# Patient Record
Sex: FEMALE | Race: WHITE | Employment: PART TIME
[De-identification: names, ages, dates, MRNs, and addresses within clinical notes are randomized per-mention and may not be internally consistent; named-entity substitution may affect disease eponyms.]

---

## 2017-01-17 ENCOUNTER — PROCEDURE VISIT (OUTPATIENT)
Dept: OBGYN | Facility: CLINIC | Age: 47
End: 2017-01-17

## 2017-01-17 DIAGNOSIS — N83.209 CYST OF OVARY, UNSPECIFIED LATERALITY: Primary | ICD-10-CM

## 2017-01-17 PROCEDURE — 76830 TRANSVAGINAL US NON-OB: CPT | Performed by: OBSTETRICS & GYNECOLOGY

## 2017-01-17 PROCEDURE — 76856 US EXAM PELVIC COMPLETE: CPT | Performed by: OBSTETRICS & GYNECOLOGY

## 2017-02-06 ENCOUNTER — OFFICE VISIT (OUTPATIENT)
Dept: OBGYN | Facility: CLINIC | Age: 47
End: 2017-02-06

## 2017-02-06 VITALS
BODY MASS INDEX: 22.56 KG/M2 | HEIGHT: 62 IN | WEIGHT: 122.6 LBS | DIASTOLIC BLOOD PRESSURE: 60 MMHG | SYSTOLIC BLOOD PRESSURE: 104 MMHG

## 2017-02-06 DIAGNOSIS — N83.201 CYST OF RIGHT OVARY: Primary | ICD-10-CM

## 2017-02-06 PROCEDURE — 99212 OFFICE O/P EST SF 10 MIN: CPT | Performed by: NURSE PRACTITIONER

## 2017-02-06 RX ORDER — FLUTICASONE PROPIONATE 50 MCG
SPRAY, SUSPENSION (ML) NASAL
COMMUNITY
Start: 2016-12-28 | End: 2022-03-23

## 2017-02-06 RX ORDER — ALPRAZOLAM 0.25 MG/1
TABLET ORAL
COMMUNITY
Start: 2016-12-20 | End: 2019-04-16

## 2017-02-06 RX ORDER — RANITIDINE 150 MG/1
TABLET ORAL
COMMUNITY
Start: 2017-01-02 | End: 2018-03-22 | Stop reason: ALTCHOICE

## 2017-03-13 ENCOUNTER — HOSPITAL ENCOUNTER (OUTPATIENT)
Age: 47
Setting detail: SPECIMEN
Discharge: HOME | End: 2017-03-13
Payer: MEDICARE

## 2017-03-13 LAB
ABSOLUTE EOS #: 0.1 K/UL (ref 0–0.4)
ABSOLUTE LYMPH #: 1.3 K/UL (ref 1–4.8)
ABSOLUTE MONO #: 1 K/UL (ref 0.1–1.2)
ALBUMIN SERPL-MCNC: 3.9 G/DL (ref 3.5–5.2)
ALBUMIN/GLOBULIN RATIO: 1.6 (ref 1–2.5)
ALP BLD-CCNC: 77 U/L (ref 35–104)
ALT SERPL-CCNC: 11 U/L (ref 5–33)
ANION GAP SERPL CALCULATED.3IONS-SCNC: 14 MMOL/L (ref 9–17)
AST SERPL-CCNC: 16 U/L
BASOPHILS # BLD: 0 % (ref 0–2)
BASOPHILS ABSOLUTE: 0 K/UL (ref 0–0.2)
BILIRUB SERPL-MCNC: 0.38 MG/DL (ref 0.3–1.2)
BUN BLDV-MCNC: 9 MG/DL (ref 6–20)
BUN/CREAT BLD: ABNORMAL (ref 9–20)
CALCIUM SERPL-MCNC: 8.5 MG/DL (ref 8.6–10.4)
CHLORIDE BLD-SCNC: 100 MMOL/L (ref 98–107)
CHOLESTEROL, TOTAL: 139 MG/DL
CHOLESTEROL/HDL RATIO: 2.4
CHOLESTEROL/HDL RATIO: ABNORMAL
CHOLESTEROL: 139 MG/DL
CO2: 23 MMOL/L (ref 20–31)
CREAT SERPL-MCNC: 0.59 MG/DL (ref 0.5–0.9)
DIFFERENTIAL TYPE: ABNORMAL
EOSINOPHILS RELATIVE PERCENT: 1 % (ref 1–4)
GFR AFRICAN AMERICAN: >60 ML/MIN
GFR NON-AFRICAN AMERICAN: >60 ML/MIN
GFR SERPL CREATININE-BSD FRML MDRD: ABNORMAL ML/MIN/{1.73_M2}
GFR SERPL CREATININE-BSD FRML MDRD: ABNORMAL ML/MIN/{1.73_M2}
GLUCOSE BLD-MCNC: 112 MG/DL (ref 70–99)
HCT VFR BLD CALC: 39.9 % (ref 36–46)
HDLC SERPL-MCNC: 57 MG/DL
HDLC SERPL-MCNC: 57 MG/DL (ref 35–70)
HEMOGLOBIN: 13.9 G/DL (ref 12–16)
LDL CHOLESTEROL CALCULATED: 75 MG/DL (ref 0–160)
LDL CHOLESTEROL: 71 MG/DL (ref 0–130)
LYMPHOCYTES # BLD: 12 % (ref 24–44)
MCH RBC QN AUTO: 32.9 PG (ref 26–34)
MCHC RBC AUTO-ENTMCNC: 34.7 G/DL (ref 31–37)
MCV RBC AUTO: 94.8 FL (ref 80–100)
MONOCYTES # BLD: 10 % (ref 2–11)
PDW BLD-RTO: 13 % (ref 12.5–15.4)
PLATELET # BLD: 213 K/UL (ref 140–450)
PLATELET ESTIMATE: ABNORMAL
PMV BLD AUTO: 8.6 FL (ref 6–12)
POTASSIUM SERPL-SCNC: 4.2 MMOL/L (ref 3.7–5.3)
RBC # BLD: 4.21 M/UL (ref 4–5.2)
RBC # BLD: ABNORMAL 10*6/UL
SEG NEUTROPHILS: 77 % (ref 36–66)
SEGMENTED NEUTROPHILS ABSOLUTE COUNT: 8 K/UL (ref 1.8–7.7)
SODIUM BLD-SCNC: 137 MMOL/L (ref 135–144)
TOTAL PROTEIN: 6.3 G/DL (ref 6.4–8.3)
TRIGL SERPL-MCNC: 55 MG/DL
TRIGL SERPL-MCNC: 55 MG/DL
TSH SERPL DL<=0.05 MIU/L-ACNC: 4.92 MIU/L (ref 0.3–5)
VLDLC SERPL CALC-MCNC: ABNORMAL MG/DL
VLDLC SERPL CALC-MCNC: NORMAL MG/DL (ref 1–30)
WBC # BLD: 10.4 K/UL (ref 3.5–11)
WBC # BLD: ABNORMAL 10*3/UL

## 2017-03-13 PROCEDURE — 80061 LIPID PANEL: CPT

## 2017-03-13 PROCEDURE — 85025 COMPLETE CBC W/AUTO DIFF WBC: CPT

## 2017-03-13 PROCEDURE — 36415 COLL VENOUS BLD VENIPUNCTURE: CPT

## 2017-03-13 PROCEDURE — 84443 ASSAY THYROID STIM HORMONE: CPT

## 2017-03-13 PROCEDURE — 80053 COMPREHEN METABOLIC PANEL: CPT

## 2017-04-02 ENCOUNTER — HOSPITAL ENCOUNTER (EMERGENCY)
Age: 47
Discharge: HOME | End: 2017-04-03
Attending: EMERGENCY MEDICINE
Payer: MEDICARE

## 2017-04-02 DIAGNOSIS — R10.31 ABDOMINAL PAIN, RIGHT LOWER QUADRANT: Primary | ICD-10-CM

## 2017-04-02 DIAGNOSIS — Z87.42 HISTORY OF OVARIAN CYST: ICD-10-CM

## 2017-04-02 LAB
-: ABNORMAL
AMORPHOUS: ABNORMAL
BACTERIA: ABNORMAL
BILIRUBIN URINE: NEGATIVE
CASTS UA: ABNORMAL /LPF
COLOR: YELLOW
COMMENT UA: ABNORMAL
CRYSTALS, UA: ABNORMAL /HPF
EPITHELIAL CELLS UA: ABNORMAL /HPF
GLUCOSE URINE: NEGATIVE
HCG(URINE) PREGNANCY TEST: NEGATIVE
KETONES, URINE: NEGATIVE
LEUKOCYTE ESTERASE, URINE: NEGATIVE
MUCUS: ABNORMAL
NITRITE, URINE: NEGATIVE
OTHER OBSERVATIONS UA: ABNORMAL
PH UA: 6 (ref 5–8)
PROTEIN UA: NEGATIVE
RBC UA: ABNORMAL /HPF
RENAL EPITHELIAL, UA: ABNORMAL /HPF
SPECIFIC GRAVITY UA: 1.01 (ref 1–1.03)
TRICHOMONAS: ABNORMAL
TURBIDITY: CLEAR
URINE HGB: NEGATIVE
UROBILINOGEN, URINE: NORMAL
WBC UA: ABNORMAL /HPF
YEAST: ABNORMAL

## 2017-04-02 PROCEDURE — 84703 CHORIONIC GONADOTROPIN ASSAY: CPT

## 2017-04-02 PROCEDURE — 6360000002 HC RX W HCPCS: Performed by: EMERGENCY MEDICINE

## 2017-04-02 PROCEDURE — 96372 THER/PROPH/DIAG INJ SC/IM: CPT

## 2017-04-02 PROCEDURE — 99284 EMERGENCY DEPT VISIT MOD MDM: CPT

## 2017-04-02 PROCEDURE — 81001 URINALYSIS AUTO W/SCOPE: CPT

## 2017-04-02 PROCEDURE — 6370000000 HC RX 637 (ALT 250 FOR IP): Performed by: EMERGENCY MEDICINE

## 2017-04-02 RX ORDER — HYDROCODONE BITARTRATE AND ACETAMINOPHEN 5; 325 MG/1; MG/1
1 TABLET ORAL EVERY 6 HOURS PRN
Qty: 6 TABLET | Refills: 0 | Status: SHIPPED | OUTPATIENT
Start: 2017-04-02 | End: 2017-04-10 | Stop reason: ALTCHOICE

## 2017-04-02 RX ORDER — KETOROLAC TROMETHAMINE 30 MG/ML
60 INJECTION, SOLUTION INTRAMUSCULAR; INTRAVENOUS ONCE
Status: COMPLETED | OUTPATIENT
Start: 2017-04-02 | End: 2017-04-02

## 2017-04-02 RX ORDER — HYDROCODONE BITARTRATE AND ACETAMINOPHEN 5; 325 MG/1; MG/1
2 TABLET ORAL ONCE
Status: COMPLETED | OUTPATIENT
Start: 2017-04-02 | End: 2017-04-02

## 2017-04-02 RX ADMIN — KETOROLAC TROMETHAMINE 60 MG: 30 INJECTION, SOLUTION INTRAMUSCULAR at 22:58

## 2017-04-02 RX ADMIN — HYDROCODONE BITARTRATE AND ACETAMINOPHEN 2 TABLET: 5; 325 TABLET ORAL at 22:58

## 2017-04-02 ASSESSMENT — PAIN DESCRIPTION - LOCATION: LOCATION: PELVIS

## 2017-04-02 ASSESSMENT — PAIN DESCRIPTION - DESCRIPTORS: DESCRIPTORS: SHARP

## 2017-04-02 ASSESSMENT — PAIN SCALES - GENERAL
PAINLEVEL_OUTOF10: 8
PAINLEVEL_OUTOF10: 8

## 2017-04-03 VITALS
RESPIRATION RATE: 16 BRPM | DIASTOLIC BLOOD PRESSURE: 78 MMHG | TEMPERATURE: 98.1 F | SYSTOLIC BLOOD PRESSURE: 117 MMHG | BODY MASS INDEX: 22.08 KG/M2 | HEIGHT: 62 IN | OXYGEN SATURATION: 96 % | WEIGHT: 120 LBS | HEART RATE: 76 BPM

## 2017-04-03 ASSESSMENT — ENCOUNTER SYMPTOMS
COUGH: 1
SHORTNESS OF BREATH: 0
ABDOMINAL PAIN: 1
NAUSEA: 0
TROUBLE SWALLOWING: 0
DIARRHEA: 0
BLOOD IN STOOL: 0
PHOTOPHOBIA: 0
RHINORRHEA: 0
CONSTIPATION: 0
BACK PAIN: 0
VOMITING: 0
SORE THROAT: 0
SINUS PRESSURE: 0

## 2017-04-03 ASSESSMENT — PAIN SCALES - GENERAL
PAINLEVEL_OUTOF10: 4
PAINLEVEL_OUTOF10: 4

## 2017-04-03 ASSESSMENT — PAIN DESCRIPTION - LOCATION
LOCATION: ABDOMEN;PELVIS
LOCATION: PELVIS

## 2017-04-10 ENCOUNTER — OFFICE VISIT (OUTPATIENT)
Dept: OBGYN CLINIC | Age: 47
End: 2017-04-10
Payer: MEDICARE

## 2017-04-10 VITALS
WEIGHT: 119.6 LBS | SYSTOLIC BLOOD PRESSURE: 112 MMHG | HEIGHT: 61 IN | DIASTOLIC BLOOD PRESSURE: 80 MMHG | BODY MASS INDEX: 22.58 KG/M2

## 2017-04-10 DIAGNOSIS — Z01.419 ENCOUNTER FOR GYNECOLOGICAL EXAMINATION WITHOUT ABNORMAL FINDING: Primary | ICD-10-CM

## 2017-04-10 DIAGNOSIS — Z12.31 ENCOUNTER FOR SCREENING MAMMOGRAM FOR BREAST CANCER: ICD-10-CM

## 2017-04-10 DIAGNOSIS — N83.201 RIGHT OVARIAN CYST: ICD-10-CM

## 2017-04-10 LAB — PAP SMEAR: NORMAL

## 2017-04-10 PROCEDURE — 99396 PREV VISIT EST AGE 40-64: CPT | Performed by: NURSE PRACTITIONER

## 2017-04-10 RX ORDER — PROMETHAZINE HYDROCHLORIDE 25 MG/1
TABLET ORAL
COMMUNITY
Start: 2017-01-02 | End: 2022-03-23

## 2017-04-10 RX ORDER — GABAPENTIN 100 MG/1
CAPSULE ORAL
COMMUNITY
Start: 2017-03-08 | End: 2019-04-16

## 2017-04-10 RX ORDER — PANTOPRAZOLE SODIUM 40 MG/1
TABLET, DELAYED RELEASE ORAL
COMMUNITY
Start: 2017-02-23 | End: 2017-10-02

## 2017-04-10 RX ORDER — BENZONATATE 100 MG/1
CAPSULE ORAL
COMMUNITY
Start: 2017-04-06 | End: 2022-03-23

## 2017-04-10 RX ORDER — IBUPROFEN 800 MG/1
800 TABLET ORAL EVERY 8 HOURS PRN
Qty: 120 TABLET | Refills: 1 | Status: SHIPPED | OUTPATIENT
Start: 2017-04-10 | End: 2022-03-23

## 2017-04-10 RX ORDER — TIOTROPIUM BROMIDE INHALATION SPRAY 3.12 UG/1
SPRAY, METERED RESPIRATORY (INHALATION)
COMMUNITY
Start: 2017-03-13 | End: 2022-03-23

## 2017-04-10 RX ORDER — METHYLPREDNISOLONE 4 MG/1
TABLET ORAL
COMMUNITY
Start: 2017-04-06 | End: 2017-10-02 | Stop reason: ALTCHOICE

## 2017-04-10 RX ORDER — MIRTAZAPINE 15 MG/1
TABLET, FILM COATED ORAL
COMMUNITY
Start: 2017-04-06 | End: 2018-03-22 | Stop reason: ALTCHOICE

## 2017-04-10 ASSESSMENT — ENCOUNTER SYMPTOMS
CONSTIPATION: 0
ABDOMINAL DISTENTION: 0
COUGH: 0
SHORTNESS OF BREATH: 0
DIARRHEA: 0
ABDOMINAL PAIN: 0
BACK PAIN: 0

## 2017-04-17 DIAGNOSIS — Z01.419 ENCOUNTER FOR GYNECOLOGICAL EXAMINATION WITHOUT ABNORMAL FINDING: ICD-10-CM

## 2017-07-07 ENCOUNTER — TELEPHONE (OUTPATIENT)
Dept: GASTROENTEROLOGY | Age: 47
End: 2017-07-07

## 2017-10-02 ENCOUNTER — OFFICE VISIT (OUTPATIENT)
Dept: GASTROENTEROLOGY | Age: 47
End: 2017-10-02
Payer: MEDICARE

## 2017-10-02 VITALS
HEART RATE: 82 BPM | OXYGEN SATURATION: 98 % | DIASTOLIC BLOOD PRESSURE: 79 MMHG | BODY MASS INDEX: 22.08 KG/M2 | WEIGHT: 120 LBS | HEIGHT: 62 IN | TEMPERATURE: 97.8 F | SYSTOLIC BLOOD PRESSURE: 110 MMHG

## 2017-10-02 DIAGNOSIS — R63.4 WEIGHT LOSS: ICD-10-CM

## 2017-10-02 DIAGNOSIS — K59.00 CONSTIPATION, UNSPECIFIED CONSTIPATION TYPE: Primary | ICD-10-CM

## 2017-10-02 DIAGNOSIS — K21.9 GASTROESOPHAGEAL REFLUX DISEASE, ESOPHAGITIS PRESENCE NOT SPECIFIED: ICD-10-CM

## 2017-10-02 PROCEDURE — 99204 OFFICE O/P NEW MOD 45 MIN: CPT | Performed by: INTERNAL MEDICINE

## 2017-10-02 RX ORDER — POLYETHYLENE GLYCOL 3350 17 G/17G
17 POWDER, FOR SOLUTION ORAL 3 TIMES DAILY
Qty: 1530 G | Refills: 5 | Status: SHIPPED | OUTPATIENT
Start: 2017-10-02 | End: 2017-11-01

## 2017-10-02 RX ORDER — OMEPRAZOLE 40 MG/1
40 CAPSULE, DELAYED RELEASE ORAL DAILY
Qty: 30 CAPSULE | Refills: 3 | Status: SHIPPED | OUTPATIENT
Start: 2017-10-02 | End: 2018-03-22 | Stop reason: SDUPTHER

## 2017-10-02 ASSESSMENT — ENCOUNTER SYMPTOMS
TROUBLE SWALLOWING: 0
CONSTIPATION: 1
ABDOMINAL DISTENTION: 1
VOMITING: 1
BACK PAIN: 1
CHOKING: 0
DIARRHEA: 0
SHORTNESS OF BREATH: 1
RECTAL PAIN: 0
NAUSEA: 1
EYES NEGATIVE: 1
COUGH: 0
BLOOD IN STOOL: 0
SORE THROAT: 0
ABDOMINAL PAIN: 1
VOICE CHANGE: 1
ANAL BLEEDING: 0

## 2017-10-02 NOTE — PROGRESS NOTES
INITIAL NOTE    HISTORY OF PRESENT ILLNESS: Ms. Rin Byrd is a 41 y.o. female referred for evaluation of n/v. She reports frequent heartburns. Upper abdominal discomfort. No clear triggers. Nausea and vomiting. She lost about 30 pounds over the past few months due to poor eating. No blood in the stool or melena. She reports chronic constipation for years. This has been worse over the past few months. She reports a bowel movement every 3-5 days. Hard. She takes some MiraLAX. She reports normal EGD and colonoscopy around 5 years ago. She smokes half pack per day. She denies alcohol. No illicit drug use. Past Medical, Family, and Social History reviewed and does contribute to the patient presenting condition. Patient's PMH/PSH,SH,PSYCH Hx, MEDs, ALLERGIES, and ROS were all reviewed and updated in the appropriate sections.     PAST MEDICAL HISTORY:  Past Medical History:   Diagnosis Date    Anxiety     Asthma     Chronic back pain     COPD (chronic obstructive pulmonary disease) ([facility] [address]     Depression     Fatigue 6/15/13       Past Surgical History:   Procedure Laterality Date     SECTION, LOW TRANSVERSE      HAND TENDON SURGERY Left     wrist    INTRAUTERINE DEVICE INSERTION      Mirena    INTRAUTERINE DEVICE REMOVAL  2016    NECK SURGERY      OVARIAN CYST REMOVAL      OVARIAN CYST REMOVAL Bilateral 2005       CURRENT MEDICATIONS:    Current Outpatient Prescriptions:     SPIRIVA RESPIMAT 2.5 MCG/ACT AERS inhaler, , Disp: , Rfl:     RA ALLERGY RELIEF 10 MG tablet, , Disp: , Rfl:     gabapentin (NEURONTIN) 100 MG capsule, , Disp: , Rfl:     mirtazapine (REMERON) 15 MG tablet, , Disp: , Rfl:     benzonatate (TESSALON) 100 MG capsule, , Disp: , Rfl:     promethazine (PHENERGAN) 25 MG tablet, , Disp: , Rfl:     pantoprazole (PROTONIX) 40 MG tablet, , Disp: , Rfl:     ibuprofen (ADVIL;MOTRIN) 800 MG tablet, Take 1 tablet by mouth every 8 hours as needed for Pain, Disp: 120 tablet, Rfl: 1    ALPRAZolam (XANAX) 0.25 MG tablet, , Disp: , Rfl:     fluticasone (FLONASE) 50 MCG/ACT nasal spray, , Disp: , Rfl:     ranitidine (ZANTAC) 150 MG tablet, , Disp: , Rfl:     ondansetron (ZOFRAN ODT) 4 MG disintegrating tablet, Take 1 tablet by mouth every 8 hours as needed for Nausea, Disp: 20 tablet, Rfl: 0    omeprazole (PRILOSEC) 40 MG capsule, TAKE ONE CAPSULE BY MOUTH DAILY, Disp: 30 capsule, Rfl: 2    SYMBICORT 80-4.5 MCG/ACT AERO, INHALE TWO PUFFS BY MOUTH TWICE A DAY, Disp: 1 Inhaler, Rfl: 3    VENTOLIN  (90 BASE) MCG/ACT inhaler, INHALE TWO PUFFS BY MOUTH EVERY 6 HOURS AS NEEDED FOR WHEEZING, Disp: 1 Inhaler, Rfl: 3    Nebulizers (AIRIAL COMPACT MINI NEBULIZER) MISC, Use as directed Diagnosis: copd, Disp: 1 each, Rfl: 0    ipratropium-albuterol (DUONEB) 0.5-2.5 (3) MG/3ML SOLN nebulizer solution, Inhale 3 mLs into the lungs every 4 hours, Disp: 360 mL, Rfl: 1    buPROPion (WELLBUTRIN) 100 MG tablet, Take 1.5 tablets by mouth 2 times daily, Disp: 60 tablet, Rfl: 3    ALLERGIES:   Allergies   Allergen Reactions    Augmentin [Amoxicillin-Pot Clavulanate]     Codeine        FAMILY HISTORY:       Problem Relation Age of Onset    High Blood Pressure Mother     Diabetes Mother      No known GI pathology. SOCIAL HISTORY:   Social History     Social History    Marital status:      Spouse name: N/A    Number of children: N/A    Years of education: N/A     Occupational History    Not on file.      Social History Main Topics    Smoking status: Current Every Day Smoker     Packs/day: 0.50     Types: Cigarettes    Smokeless tobacco: Never Used    Alcohol use Yes      Comment: occasional    Drug use: No    Sexual activity: No     Other Topics Concern    Not on file     Social History Narrative       REVIEW OF SYSTEMS: A 12-point review of systems was obtained and pertinent positives and negatives were enumerated above in the history of present illness. All other reviewed systems / symptoms were negative. Review of Systems   Constitutional: Positive for fatigue and unexpected weight change (weight loss). Negative for chills, diaphoresis and fever. HENT: Positive for congestion and voice change (hoarse voice). Negative for sore throat and trouble swallowing. Eyes: Negative. Respiratory: Positive for shortness of breath (COPD). Negative for cough and choking. Cardiovascular: Positive for palpitations. Negative for chest pain and leg swelling. Gastrointestinal: Positive for abdominal distention, abdominal pain, constipation, nausea and vomiting. Negative for anal bleeding, blood in stool, diarrhea and rectal pain. Endocrine: Negative. Genitourinary: Negative for difficulty urinating and dysuria. Musculoskeletal: Positive for arthralgias and back pain. Skin: Negative. Allergic/Immunologic: Negative for environmental allergies and food allergies. Neurological: Positive for dizziness and light-headedness. Negative for headaches. Hematological: Does not bruise/bleed easily. Psychiatric/Behavioral: Positive for sleep disturbance. PHYSICAL EXAMINATION: Vital signs reviewed per the nursing documentation. /79  Pulse 82  Temp 97.8 °F (36.6 °C) (Oral)   Ht 5' 2\" (1.575 m)  Wt 120 lb (54.4 kg)  SpO2 98%  BMI 21.95 kg/m2  Body mass index is 21.95 kg/(m^2). I personally reviewed the nurse's notes and documentation and I agree with her notes. General: alert, appears stated age and cooperative Psych: Normal. and Alert and oriented, appropriate affect. . Normal affect. Mentation normal  HEENT: PERRLA. Clear conjunctivae and sclerae. Moist oral mucosae, no lesions or ulcers. The neck is supple, without lymphadenopathy or jugular venous distension. No masses. Normal thyroid. Cardiovascular: S1 S2 RRR no rubs or murmurs. Pulmonary: clear BL. No accessory muscle usage.   Abdominal Exam: Soft, NT ND, no hepato or

## 2017-10-02 NOTE — MR AVS SNAPSHOT
After Visit Summary             Ceci Benton   10/2/2017 3:15 PM   Office Visit    Description:  Female : 1970   Provider:  Lelia Nelson MD   Department:  [facility]              Your Follow-Up and Future Appointments         Below is a list of your follow-up and future appointments. This may not be a complete list as you may have made appointments directly with providers that we are not aware of or your providers may have made some for you. Please call your providers to confirm appointments. It is important to keep your appointments. Please bring your current insurance card, photo ID, co-pay, and all medication bottles to your appointment. If self-pay, payment is expected at the time of service. Your To-Do List     Future Appointments Provider Department Dept Phone    2017 1:30 PM Lelia Nelson MD [facility] [phone]    Please arrive 15 minutes prior to appointment, bring photo ID and insurance card. Future Orders Complete By Expires    EGD [GI2 Custom]  2018 10/2/2018         Information from Your Visit        Department     Name Address Phone Fax    [facility] [address] [facility]  [place] [facility]  [address] [facility] [address] [phone]      You Were Seen for:         Comments    Constipation, unspecified constipation type   [[id number]]         Vital Signs     Blood Pressure Pulse Temperature Height Weight Oxygen Saturation    110/79 82 97.8 °F (36.6 °C) (Oral) 5' 2\" (1.575 m) 120 lb (54.4 kg) 98%    Body Mass Index Smoking Status                21.95 kg/m2 Current Every Day Smoker             Today's Medication Changes          These changes are accurate as of: 10/2/17  4:00 PM.  If you have any questions, ask your nurse or doctor. START taking these medications           polyethylene glycol powder   Commonly known as:  MIRALAX   Instructions:   Take 17 g by mouth 3 times daily Quantity:  1530 g   Refills:  5   Started by:  Sim Draper MD         CHANGE how you take these medications           * omeprazole 40 MG delayed release capsule   Commonly known as:  PRILOSEC   Instructions:  TAKE ONE CAPSULE BY MOUTH DAILY   Quantity:  30 capsule   Refills:  2   What changed:  Another medication with the same name was added. Make sure you understand how and when to take each. Changed by:  Luca Quevedo MD       * omeprazole 40 MG delayed release capsule   Commonly known as:  PRILOSEC   Instructions: Take 1 capsule by mouth daily   Quantity:  30 capsule   Refills:  3   What changed: You were already taking a medication with the same name, and this prescription was added. Make sure you understand how and when to take each. Changed by:  Sim Draper MD       * Notice: This list has 2 medication(s) that are the same as other medications prescribed for you. Read the directions carefully, and ask your doctor or other care provider to review them with you.       STOP taking these medications           methylPREDNISolone 4 MG tablet   Commonly known as:  MEDROL DOSEPACK   Stopped by:  Sim Draper MD       pantoprazole 40 MG tablet   Commonly known as:  PROTONIX   Stopped by:  Sim Draper MD            Where to Get Your Medications      You can get these medications from any pharmacy     Bring a paper prescription for each of these medications     omeprazole 40 MG delayed release capsule    polyethylene glycol powder               Your Current Medications Are              omeprazole (PRILOSEC) 40 MG delayed release capsule Take 1 capsule by mouth daily    polyethylene glycol (MIRALAX) powder Take 17 g by mouth 3 times daily    SPIRIVA RESPIMAT 2.5 MCG/ACT AERS inhaler     RA ALLERGY RELIEF 10 MG tablet     gabapentin (NEURONTIN) 100 MG capsule     mirtazapine (REMERON) 15 MG tablet     benzonatate (TESSALON) 100 MG capsule     promethazine (PHENERGAN) 25 MG tablet

## 2017-10-17 ENCOUNTER — HOSPITAL ENCOUNTER (OUTPATIENT)
Age: 47
Setting detail: OUTPATIENT SURGERY
Discharge: HOME | End: 2017-10-17
Attending: INTERNAL MEDICINE | Admitting: INTERNAL MEDICINE
Payer: MEDICARE

## 2017-10-17 ENCOUNTER — ANESTHESIA EVENT (OUTPATIENT)
Dept: OPERATING ROOM | Age: 47
End: 2017-10-17
Payer: MEDICARE

## 2017-10-17 ENCOUNTER — ANESTHESIA (OUTPATIENT)
Dept: OPERATING ROOM | Age: 47
End: 2017-10-17
Payer: MEDICARE

## 2017-10-17 VITALS — DIASTOLIC BLOOD PRESSURE: 82 MMHG | OXYGEN SATURATION: 97 % | SYSTOLIC BLOOD PRESSURE: 118 MMHG

## 2017-10-17 VITALS
HEART RATE: 76 BPM | TEMPERATURE: 97.4 F | RESPIRATION RATE: 14 BRPM | BODY MASS INDEX: 21.16 KG/M2 | HEIGHT: 62 IN | SYSTOLIC BLOOD PRESSURE: 124 MMHG | OXYGEN SATURATION: 98 % | DIASTOLIC BLOOD PRESSURE: 66 MMHG | WEIGHT: 115 LBS

## 2017-10-17 LAB
-: NORMAL
HCG, PREGNANCY URINE (POC): NEGATIVE

## 2017-10-17 PROCEDURE — 6360000002 HC RX W HCPCS: Performed by: NURSE ANESTHETIST, CERTIFIED REGISTERED

## 2017-10-17 PROCEDURE — 3609017100 HC EGD: Performed by: INTERNAL MEDICINE

## 2017-10-17 PROCEDURE — 7100000001 HC PACU RECOVERY - ADDTL 15 MIN: Performed by: INTERNAL MEDICINE

## 2017-10-17 PROCEDURE — 3700000000 HC ANESTHESIA ATTENDED CARE: Performed by: INTERNAL MEDICINE

## 2017-10-17 PROCEDURE — 7100000030 HC ASPR PHASE II RECOVERY - FIRST 15 MIN: Performed by: INTERNAL MEDICINE

## 2017-10-17 PROCEDURE — 7100000000 HC PACU RECOVERY - FIRST 15 MIN: Performed by: INTERNAL MEDICINE

## 2017-10-17 PROCEDURE — 2580000003 HC RX 258: Performed by: INTERNAL MEDICINE

## 2017-10-17 PROCEDURE — 7100000010 HC PHASE II RECOVERY - FIRST 15 MIN: Performed by: INTERNAL MEDICINE

## 2017-10-17 PROCEDURE — 84703 CHORIONIC GONADOTROPIN ASSAY: CPT

## 2017-10-17 PROCEDURE — 43235 EGD DIAGNOSTIC BRUSH WASH: CPT | Performed by: INTERNAL MEDICINE

## 2017-10-17 RX ORDER — SODIUM CHLORIDE, SODIUM LACTATE, POTASSIUM CHLORIDE, CALCIUM CHLORIDE 600; 310; 30; 20 MG/100ML; MG/100ML; MG/100ML; MG/100ML
INJECTION, SOLUTION INTRAVENOUS CONTINUOUS
Status: DISCONTINUED | OUTPATIENT
Start: 2017-10-17 | End: 2017-10-17 | Stop reason: HOSPADM

## 2017-10-17 RX ORDER — PROPOFOL 10 MG/ML
INJECTION, EMULSION INTRAVENOUS PRN
Status: DISCONTINUED | OUTPATIENT
Start: 2017-10-17 | End: 2017-10-17 | Stop reason: SDUPTHER

## 2017-10-17 RX ORDER — ONDANSETRON 2 MG/ML
4 INJECTION INTRAMUSCULAR; INTRAVENOUS
Status: DISCONTINUED | OUTPATIENT
Start: 2017-10-17 | End: 2017-10-17 | Stop reason: HOSPADM

## 2017-10-17 RX ORDER — LABETALOL HYDROCHLORIDE 5 MG/ML
5 INJECTION, SOLUTION INTRAVENOUS EVERY 10 MIN PRN
Status: DISCONTINUED | OUTPATIENT
Start: 2017-10-17 | End: 2017-10-17 | Stop reason: HOSPADM

## 2017-10-17 RX ORDER — DIPHENHYDRAMINE HYDROCHLORIDE 50 MG/ML
12.5 INJECTION INTRAMUSCULAR; INTRAVENOUS
Status: DISCONTINUED | OUTPATIENT
Start: 2017-10-17 | End: 2017-10-17 | Stop reason: HOSPADM

## 2017-10-17 RX ADMIN — PROPOFOL 200 MG: 10 INJECTION, EMULSION INTRAVENOUS at 12:52

## 2017-10-17 RX ADMIN — SODIUM CHLORIDE, POTASSIUM CHLORIDE, SODIUM LACTATE AND CALCIUM CHLORIDE: 600; 310; 30; 20 INJECTION, SOLUTION INTRAVENOUS at 10:49

## 2017-10-17 ASSESSMENT — ENCOUNTER SYMPTOMS: STRIDOR: 0

## 2017-10-17 ASSESSMENT — COPD QUESTIONNAIRES: CAT_SEVERITY: NO INTERVAL CHANGE

## 2017-10-17 ASSESSMENT — PAIN SCALES - GENERAL: PAINLEVEL_OUTOF10: 0

## 2017-10-17 ASSESSMENT — PAIN - FUNCTIONAL ASSESSMENT: PAIN_FUNCTIONAL_ASSESSMENT: 0-10

## 2017-10-17 ASSESSMENT — LIFESTYLE VARIABLES: SMOKING_STATUS: 1

## 2017-10-17 NOTE — ANESTHESIA POSTPROCEDURE EVALUATION
POST- ANESTHESIA EVALUATION       Pt Name: Yevgeniy Merritt  MRN: [id number]  YOB: 1970  Date of evaluation: 10/17/2017  Time:  4:13 PM      /66   Pulse 76   Temp 97.4 °F (36.3 °C) (Temporal)   Resp 14   Ht 5' 2\" (1.575 m)   Wt 115 lb (52.2 kg)   SpO2 98%   BMI 21.03 kg/m²      Consciousness Level  Awake  Cardiopulmonary Status  Stable  Pain Adequately Treated YES  Nausea / Vomiting  NO  Adequate Hydration  YES  Anesthesia Related Complications NONE      Electronically signed by Wero Guzman MD on 10/17/2017 at 4:13 PM       Department of Anesthesiology  Postprocedure Note    Patient: Yevgeniy Merritt  MRN: [id number]  YOB: 1970  Date of evaluation: 10/17/2017  Time:  4:12 PM     Procedure Summary     Date:  10/17/17 Room / Location:  [address] [place] [address] Jim Coles 08 / [address]    Anesthesia Start:  1247 Anesthesia Stop:  4846    Procedure:  EGD ESOPHAGOGASTRODUODENOSCOPY (N/A Esophagus) Diagnosis:  (GERD WEIGHT LOSS CONSTIPATION)    Surgeon:  Rachid Davis MD Responsible Provider:  Wero Guzman MD    Anesthesia Type:  MAC, general ASA Status:  2          Anesthesia Type: MAC, general    Guzman Phase I: Guzman Score: 10    Guzman Phase II: Guzman Score: 10    Last vitals: Reviewed and per EMR flowsheets.        Anesthesia Post Evaluation

## 2017-10-17 NOTE — H&P
Take 1.5 tablets by mouth 2 times daily 60 tablet 3        General health:  Fairly good. No fever or chills. Skin:  No itching, redness or rash. HEENT:  No headache, epistaxis or sore throat. Neck:  No pain, stiffness or masses. Cardiovascular/Respiratory system:  Coughing, hx COPD. No chest pain, palpitation or shortness of breath. Gastrointestinal tract: See HPI. Genitourinary:  No burning on micturition. No hesitancy, urgency, frequency or discoloration of urine. Locomotor:  No bone or joint pains. No swelling. Neuropsychiatric:  No referable complaints. GENERAL PHYSICAL EXAM:     Vitals: /73   Pulse 77   Temp 97.2 °F (36.2 °C) (Oral)   Resp 16   Ht 5' 2\" (1.575 m)   Wt 115 lb (52.2 kg)   SpO2 98%   BMI 21.03 kg/m²  Body mass index is 21.03 kg/m². GENERAL APPEARANCE:   Yevgeniy Merritt is 41 y.o.,  female, mildly obese, nourished, conscious, alert. Does not appear to be distress or pain at this time. SKIN:  Warm, dry, no cyanosis or jaundice. HEAD:  Normocephalic, atraumatic, no swelling or tenderness. EYES:  Pupils equal, reactive to light. EARS:  No discharge, no marked hearing loss. NOSE:  No rhinorrhea, epistaxis or septal deformity. THROAT:  Not congested. No ulceration bleeding or discharge. NECK:  No stiffness, trachea central.  No palpable masses or L.N.                 CHEST:  Symmetrical and equal on expansion. HEART:  RRR S1 > S2. No audible murmurs or gallops. LUNGS:  Equal on expansion, normal breath sounds. No adventitious sounds. ABDOMEN:  Obese. Soft on palpation. No localized tenderness. No guarding or rigidity. No palpable organomegaly.               LYMPHATICS:  No

## 2017-10-17 NOTE — ANESTHESIA PRE PROCEDURE
[address]: 16  Min: 12   Min taken time: 10/17/17 1026  Max: 16   Max taken time: 10/17/17 1026  BP  Min: 120/73   Min taken time: 10/17/17 1026  Max: 120/73   Max taken time: 10/17/17 1026  SpO2  Av %  Min: 98 %   Min taken time: 10/17/17 1026  Max: 98 %   Max taken time: 10/17/17 1026    BMI:   Wt Readings from Last 3 Encounters:   10/17/17 115 lb (52.2 kg)   10/02/17 120 lb (54.4 kg)   04/10/17 119 lb 9.6 oz (54.3 kg)     Body mass index is 21.03 kg/m². CBC:   Lab Results   Component Value Date    WBC 10.4 2017    RBC 4.21 2017    HGB 13.9 2017    HCT 39.9 2017    MCV 94.8 2017    RDW 13.0 2017     2017       CMP:   Lab Results   Component Value Date     2017    K 4.2 2017     2017    CO2 23 2017    BUN 9 2017    CREATININE 0.59 2017    GFRAA >60 2017    LABGLOM >60 2017    GLUCOSE 112 2017    PROT 6.3 2017    CALCIUM 8.5 2017    BILITOT 0.38 2017    ALKPHOS 77 2017    AST 16 2017    ALT 11 2017       POC Tests: No results for input(s): POCGLU, POCNA, POCK, POCCL, POCBUN, POCHEMO, POCHCT in the last 72 hours. Coags  No results found for: PROTIME, INR, APTT    HCG (If Applicable)   Lab Results   Component Value Date    PREGTESTUR NEGATIVE 2017    HCG NEGATIVE 10/17/2017        ABGs No results found for: PHART, PO2ART, TIB1FNI, BNH7JEP, BEART, O4WCAGXU     Type & Screen (If Applicable)  No results found for: [facility]    Radiology:  Appropriate reports and / or films reviewed. Cardiac Testing: Appropriate reports and / or films reviewed.     EKG: Reviewed where appropriate    Daniela Grewal MD         Department of Anesthesiology  Preprocedure Note       Name:  Cheryl Goss   Age:  41 y.o.  :  1970                                          MRN:  [id number]         Date:  10/17/2017      Surgeon: Ebenezer Foreman):  Mathew Donaldson, every 4 hours 10/28/15   Gia Hernández MD   buPROPion [facility]) 100 MG tablet Take 1.5 tablets by mouth 2 times daily 5/29/15   Zonia Lebron MD       Current medications:    Current Facility-Administered Medications   Medication Dose Route Frequency Provider Last Rate Last Dose    lactated ringers infusion   Intravenous Continuous Evert Connell  mL/hr at 10/17/17 1049         Allergies:     Allergies   Allergen Reactions    Augmentin [Amoxicillin-Pot Clavulanate]     Codeine        Problem List:    Patient Active Problem List   Diagnosis Code    Heart palpitations R00.2    Insomnia G47.00    GERD (gastroesophageal reflux disease) K21.9    Weight loss R63.4       Past Medical History:        Diagnosis Date    Anxiety     Asthma     Chronic back pain     COPD (chronic obstructive pulmonary disease) ([facility] [address]     Depression     Fatigue 6/15/13       Past Surgical History:        Procedure Laterality Date     SECTION, LOW TRANSVERSE      COLONOSCOPY      ENDOSCOPY, COLON, DIAGNOSTIC      HAND TENDON SURGERY Left     wrist    INTRAUTERINE DEVICE INSERTION      Mirena    INTRAUTERINE DEVICE REMOVAL  2016    NECK SURGERY      OVARIAN CYST REMOVAL      OVARIAN CYST REMOVAL Bilateral 2005       Social History:    Social History   Substance Use Topics    Smoking status: Current Every Day Smoker     Packs/day: 0.50     Types: Cigarettes    Smokeless tobacco: Never Used    Alcohol use Yes      Comment: occasional                                Ready to quit: Not Answered  Counseling given: Not Answered      Vital Signs (Current):   Vitals:    10/17/17 1026   BP: 120/73   Pulse: 77   Resp: 16   Temp: 97.2 °F (36.2 °C)   TempSrc: Oral   SpO2: 98%   Weight: 115 lb (52.2 kg)   Height: 5' 2\" (1.575 m)                                              BP Readings from Last 3 Encounters:   10/17/17 120/73   10/02/17 110/79   04/10/17 112/80       NPO Status: Time of last liquid

## 2017-10-17 NOTE — OP NOTE
[facility]     REFERRING PHYSICIAN: No ref. provider found     PRIMARY CARE PROVIDER: Vikas Cordova MD    ATTENDING PHYSICIAN: Bernice Quiroz MD     HISTORY: Ms. Aminata Caal is a 41 y.o. female who presents to the [place] [address] Endoscopy unit for upper endoscopy. The patient's clinical history is remarkable for abd pain. She is currently medically stable and appropriate for the planned procedure. PREOPERATIVE DIAGNOSIS: abd pain. PROCEDURES:   1) Transoral Upper Endoscopy. POSTOPERATIVE DIAGNOSIS:   Mild esophagitis  Minimal gastritis     MEDICATIONS:   MAC per anesthesia    INSTRUMENT: Olympus GIF-H180 flexible Gastroscope. PREPARATION: The nature and character of the procedure as well as risks, benefits, and alternatives were discussed with the patient and informed consent was obtained. Complications were said to include, but were not limited to: medication allergy, medication reaction, cardiovascular and respiratory problems, bleeding, perforation, infection, and/or missed diagnosis. Following arrival in the endoscopy room, the patient was placed in the left lateral decubitus position and final time-out accomplished in the presence of the nursing staff. Baseline vital signs were obtained and reviewed, and IV sedation was subsequently initiated. FINDINGS:   Esophagus: The esophagus was inspected to the Z-line. The endoscopic exam showed irregular z-line with mild erythema. Stomach: The stomach was inspected in both forward and retroflex fashion and was appropriately distensible. The cardia, fundus, incisura, antrum and pylorus were identified via direct visualization. The endoscopic exam showed minimal scattered erythema. Duodenum: The proximal small bowel was inspected through the bulb, sweep, and second portion of the duodenum. The endoscopic exam showed no pathology. RECOMMENDATIONS:   1) Follow up with referring provider, as previously scheduled. 2) Omeprazole 40 mg daily. 3) Avoid NSAIDs. 4) Follow up with me in office in 3-4 weeks.           Rachid Pollock

## 2018-03-22 ENCOUNTER — OFFICE VISIT (OUTPATIENT)
Dept: PULMONOLOGY | Age: 48
End: 2018-03-22
Payer: MEDICARE

## 2018-03-22 ENCOUNTER — TELEPHONE (OUTPATIENT)
Dept: PULMONOLOGY | Age: 48
End: 2018-03-22

## 2018-03-22 VITALS
WEIGHT: 116 LBS | RESPIRATION RATE: 12 BRPM | OXYGEN SATURATION: 98 % | HEART RATE: 84 BPM | SYSTOLIC BLOOD PRESSURE: 98 MMHG | BODY MASS INDEX: 21.35 KG/M2 | DIASTOLIC BLOOD PRESSURE: 65 MMHG | HEIGHT: 62 IN | TEMPERATURE: 98.6 F

## 2018-03-22 VITALS — WEIGHT: 116 LBS | OXYGEN SATURATION: 97 % | HEIGHT: 62 IN | BODY MASS INDEX: 21.35 KG/M2 | HEART RATE: 56 BPM

## 2018-03-22 DIAGNOSIS — J45.40 MODERATE PERSISTENT ASTHMA WITHOUT COMPLICATION: Primary | ICD-10-CM

## 2018-03-22 DIAGNOSIS — F17.200 SMOKING: ICD-10-CM

## 2018-03-22 DIAGNOSIS — K21.9 GASTROESOPHAGEAL REFLUX DISEASE, ESOPHAGITIS PRESENCE NOT SPECIFIED: ICD-10-CM

## 2018-03-22 DIAGNOSIS — R06.02 SHORTNESS OF BREATH: Primary | ICD-10-CM

## 2018-03-22 DIAGNOSIS — R49.0 HOARSENESS OF VOICE: ICD-10-CM

## 2018-03-22 PROCEDURE — G8484 FLU IMMUNIZE NO ADMIN: HCPCS | Performed by: INTERNAL MEDICINE

## 2018-03-22 PROCEDURE — 94375 RESPIRATORY FLOW VOLUME LOOP: CPT | Performed by: INTERNAL MEDICINE

## 2018-03-22 PROCEDURE — 94726 PLETHYSMOGRAPHY LUNG VOLUMES: CPT | Performed by: INTERNAL MEDICINE

## 2018-03-22 PROCEDURE — 94729 DIFFUSING CAPACITY: CPT | Performed by: INTERNAL MEDICINE

## 2018-03-22 PROCEDURE — 99244 OFF/OP CNSLTJ NEW/EST MOD 40: CPT | Performed by: INTERNAL MEDICINE

## 2018-03-22 PROCEDURE — G8420 CALC BMI NORM PARAMETERS: HCPCS | Performed by: INTERNAL MEDICINE

## 2018-03-22 PROCEDURE — G8427 DOCREV CUR MEDS BY ELIG CLIN: HCPCS | Performed by: INTERNAL MEDICINE

## 2018-03-22 RX ORDER — BUSPIRONE HYDROCHLORIDE 30 MG/1
30 TABLET ORAL DAILY
COMMUNITY
Start: 2018-02-22 | End: 2019-04-16

## 2018-03-22 RX ORDER — ARIPIPRAZOLE 2 MG/1
2 TABLET ORAL DAILY
Status: ON HOLD | COMMUNITY
Start: 2018-02-22 | End: 2018-06-27

## 2018-03-22 RX ORDER — TRAMADOL HYDROCHLORIDE 50 MG/1
50 TABLET ORAL EVERY 6 HOURS PRN
Status: ON HOLD | COMMUNITY
Start: 2018-03-12 | End: 2018-06-27 | Stop reason: HOSPADM

## 2018-03-23 NOTE — PROGRESS NOTES
[facility], [facility].  [address] [place] [address]  Dept: [phone]  Dept Fax: [phone]      3/23/18    Patient: Shelbi Badillo  YOB: 1970    Dear Cathy Millard MD,    I had the pleasure of seeing one of your patients, Steven Peralta today in the office today. Please find attached my note with the assessment and plan of care. Thank you for allowing me to participate in the care of this patient. I will keep you updated on this patient's follow up and I look forward to serving you and your patients again in the future.     Kenyetta Austin MD  3/23/2018 7:35 PM

## 2018-06-23 ENCOUNTER — APPOINTMENT (OUTPATIENT)
Dept: CT IMAGING | Age: 48
DRG: 263 | End: 2018-06-23
Payer: MEDICARE

## 2018-06-23 ENCOUNTER — HOSPITAL ENCOUNTER (INPATIENT)
Age: 48
LOS: 3 days | Discharge: HOME | DRG: 263 | End: 2018-06-27
Attending: EMERGENCY MEDICINE | Admitting: FAMILY MEDICINE
Payer: MEDICARE

## 2018-06-23 DIAGNOSIS — K82.8 DYSFUNCTIONAL GALLBLADDER: ICD-10-CM

## 2018-06-23 DIAGNOSIS — D72.829 LEUKOCYTOSIS, UNSPECIFIED TYPE: ICD-10-CM

## 2018-06-23 DIAGNOSIS — R10.11 RUQ ABDOMINAL PAIN: Primary | ICD-10-CM

## 2018-06-23 DIAGNOSIS — R11.2 INTRACTABLE VOMITING WITH NAUSEA, UNSPECIFIED VOMITING TYPE: ICD-10-CM

## 2018-06-23 LAB
ABSOLUTE EOS #: 0 K/UL (ref 0–0.4)
ABSOLUTE IMMATURE GRANULOCYTE: ABNORMAL K/UL (ref 0–0.3)
ABSOLUTE LYMPH #: 0.9 K/UL (ref 1–4.8)
ABSOLUTE MONO #: 0.2 K/UL (ref 0.1–1.3)
ALBUMIN SERPL-MCNC: 4.4 G/DL (ref 3.5–5.2)
ALBUMIN/GLOBULIN RATIO: NORMAL (ref 1–2.5)
ALP BLD-CCNC: 82 U/L (ref 35–104)
ALT SERPL-CCNC: 12 U/L (ref 5–33)
ANION GAP SERPL CALCULATED.3IONS-SCNC: 13 MMOL/L (ref 9–17)
AST SERPL-CCNC: 19 U/L
BASOPHILS # BLD: 1 % (ref 0–2)
BASOPHILS ABSOLUTE: 0.1 K/UL (ref 0–0.2)
BILIRUB SERPL-MCNC: 0.84 MG/DL (ref 0.3–1.2)
BILIRUBIN DIRECT: 0.21 MG/DL
BILIRUBIN, INDIRECT: 0.63 MG/DL (ref 0–1)
BUN BLDV-MCNC: 17 MG/DL (ref 6–20)
BUN/CREAT BLD: ABNORMAL (ref 9–20)
CALCIUM SERPL-MCNC: 9.6 MG/DL (ref 8.6–10.4)
CHLORIDE BLD-SCNC: 104 MMOL/L (ref 98–107)
CO2: 25 MMOL/L (ref 20–31)
CREAT SERPL-MCNC: 0.65 MG/DL (ref 0.5–0.9)
DIFFERENTIAL TYPE: ABNORMAL
EOSINOPHILS RELATIVE PERCENT: 0 % (ref 0–4)
GFR AFRICAN AMERICAN: >60 ML/MIN
GFR NON-AFRICAN AMERICAN: >60 ML/MIN
GFR SERPL CREATININE-BSD FRML MDRD: ABNORMAL ML/MIN/{1.73_M2}
GFR SERPL CREATININE-BSD FRML MDRD: ABNORMAL ML/MIN/{1.73_M2}
GLOBULIN: NORMAL G/DL (ref 1.5–3.8)
GLUCOSE BLD-MCNC: 135 MG/DL (ref 70–99)
HCG QUALITATIVE: NEGATIVE
HCT VFR BLD CALC: 39.6 % (ref 36–46)
HEMOGLOBIN: 13.4 G/DL (ref 12–16)
IMMATURE GRANULOCYTES: ABNORMAL %
LIPASE: 15 U/L (ref 13–60)
LYMPHOCYTES # BLD: 7 % (ref 24–44)
MCH RBC QN AUTO: 33.2 PG (ref 26–34)
MCHC RBC AUTO-ENTMCNC: 33.9 G/DL (ref 31–37)
MCV RBC AUTO: 98 FL (ref 80–100)
MONOCYTES # BLD: 2 % (ref 1–7)
NRBC AUTOMATED: ABNORMAL PER 100 WBC
PDW BLD-RTO: 13.1 % (ref 11.5–14.9)
PLATELET # BLD: 269 K/UL (ref 150–450)
PLATELET ESTIMATE: ABNORMAL
PMV BLD AUTO: 9 FL (ref 6–12)
POTASSIUM SERPL-SCNC: 4 MMOL/L (ref 3.7–5.3)
RBC # BLD: 4.04 M/UL (ref 4–5.2)
RBC # BLD: ABNORMAL 10*6/UL
SEG NEUTROPHILS: 90 % (ref 36–66)
SEGMENTED NEUTROPHILS ABSOLUTE COUNT: 11.1 K/UL (ref 1.3–9.1)
SODIUM BLD-SCNC: 142 MMOL/L (ref 135–144)
TOTAL PROTEIN: 7.1 G/DL (ref 6.4–8.3)
WBC # BLD: 12.3 K/UL (ref 3.5–11)
WBC # BLD: ABNORMAL 10*3/UL

## 2018-06-23 PROCEDURE — 99285 EMERGENCY DEPT VISIT HI MDM: CPT

## 2018-06-23 PROCEDURE — 80076 HEPATIC FUNCTION PANEL: CPT

## 2018-06-23 PROCEDURE — 85025 COMPLETE CBC W/AUTO DIFF WBC: CPT

## 2018-06-23 PROCEDURE — 6360000002 HC RX W HCPCS: Performed by: EMERGENCY MEDICINE

## 2018-06-23 PROCEDURE — 96375 TX/PRO/DX INJ NEW DRUG ADDON: CPT

## 2018-06-23 PROCEDURE — 74177 CT ABD & PELVIS W/CONTRAST: CPT

## 2018-06-23 PROCEDURE — 83690 ASSAY OF LIPASE: CPT

## 2018-06-23 PROCEDURE — 2580000003 HC RX 258: Performed by: EMERGENCY MEDICINE

## 2018-06-23 PROCEDURE — 80048 BASIC METABOLIC PNL TOTAL CA: CPT

## 2018-06-23 PROCEDURE — 6360000004 HC RX CONTRAST MEDICATION: Performed by: EMERGENCY MEDICINE

## 2018-06-23 PROCEDURE — 36415 COLL VENOUS BLD VENIPUNCTURE: CPT

## 2018-06-23 PROCEDURE — 84703 CHORIONIC GONADOTROPIN ASSAY: CPT

## 2018-06-23 PROCEDURE — 96374 THER/PROPH/DIAG INJ IV PUSH: CPT

## 2018-06-23 RX ORDER — 0.9 % SODIUM CHLORIDE 0.9 %
80 INTRAVENOUS SOLUTION INTRAVENOUS ONCE
Status: COMPLETED | OUTPATIENT
Start: 2018-06-24 | End: 2018-06-23

## 2018-06-23 RX ORDER — 0.9 % SODIUM CHLORIDE 0.9 %
1000 INTRAVENOUS SOLUTION INTRAVENOUS ONCE
Status: COMPLETED | OUTPATIENT
Start: 2018-06-23 | End: 2018-06-23

## 2018-06-23 RX ORDER — ONDANSETRON 2 MG/ML
4 INJECTION INTRAMUSCULAR; INTRAVENOUS ONCE
Status: COMPLETED | OUTPATIENT
Start: 2018-06-23 | End: 2018-06-23

## 2018-06-23 RX ORDER — SODIUM CHLORIDE 0.9 % (FLUSH) 0.9 %
10 SYRINGE (ML) INJECTION PRN
Status: DISCONTINUED | OUTPATIENT
Start: 2018-06-23 | End: 2018-06-27 | Stop reason: HOSPADM

## 2018-06-23 RX ORDER — FENTANYL CITRATE 50 UG/ML
50 INJECTION, SOLUTION INTRAMUSCULAR; INTRAVENOUS ONCE
Status: COMPLETED | OUTPATIENT
Start: 2018-06-23 | End: 2018-06-23

## 2018-06-23 RX ADMIN — SODIUM CHLORIDE 80 ML: 9 INJECTION, SOLUTION INTRAVENOUS at 23:53

## 2018-06-23 RX ADMIN — IOPAMIDOL 75 ML: 755 INJECTION, SOLUTION INTRAVENOUS at 23:54

## 2018-06-23 RX ADMIN — FENTANYL CITRATE 50 MCG: 50 INJECTION INTRAMUSCULAR; INTRAVENOUS at 21:43

## 2018-06-23 RX ADMIN — ONDANSETRON 4 MG: 2 INJECTION INTRAMUSCULAR; INTRAVENOUS at 21:43

## 2018-06-23 RX ADMIN — SODIUM CHLORIDE 1000 ML: 9 INJECTION, SOLUTION INTRAVENOUS at 21:43

## 2018-06-23 RX ADMIN — Medication 10 ML: at 23:53

## 2018-06-23 ASSESSMENT — PAIN SCALES - GENERAL
PAINLEVEL_OUTOF10: 9
PAINLEVEL_OUTOF10: 9

## 2018-06-23 ASSESSMENT — ENCOUNTER SYMPTOMS
VOMITING: 1
DIARRHEA: 0
ABDOMINAL PAIN: 1
SHORTNESS OF BREATH: 0
NAUSEA: 1
EYE PAIN: 0
BACK PAIN: 0
COUGH: 0
SORE THROAT: 0

## 2018-06-24 ENCOUNTER — APPOINTMENT (OUTPATIENT)
Dept: NUCLEAR MEDICINE | Age: 48
DRG: 263 | End: 2018-06-24
Payer: MEDICARE

## 2018-06-24 PROBLEM — E43 SEVERE MALNUTRITION (HCC): Status: ACTIVE | Noted: 2018-06-24

## 2018-06-24 PROBLEM — K82.8 DYSFUNCTIONAL GALLBLADDER: Status: ACTIVE | Noted: 2018-06-24

## 2018-06-24 PROBLEM — N30.00 ACUTE CYSTITIS WITHOUT HEMATURIA: Status: ACTIVE | Noted: 2018-06-24

## 2018-06-24 LAB
-: ABNORMAL
ABSOLUTE EOS #: 0 K/UL (ref 0–0.4)
ABSOLUTE IMMATURE GRANULOCYTE: ABNORMAL K/UL (ref 0–0.3)
ABSOLUTE LYMPH #: 0.8 K/UL (ref 1–4.8)
ABSOLUTE MONO #: 0.1 K/UL (ref 0.1–1.3)
AMORPHOUS: ABNORMAL
ANION GAP SERPL CALCULATED.3IONS-SCNC: 15 MMOL/L (ref 9–17)
BACTERIA: ABNORMAL
BASOPHILS # BLD: 1 % (ref 0–2)
BASOPHILS ABSOLUTE: 0.1 K/UL (ref 0–0.2)
BILIRUBIN URINE: NEGATIVE
BUN BLDV-MCNC: 15 MG/DL (ref 6–20)
BUN/CREAT BLD: ABNORMAL (ref 9–20)
CALCIUM SERPL-MCNC: 9.4 MG/DL (ref 8.6–10.4)
CASTS UA: ABNORMAL /LPF
CHLORIDE BLD-SCNC: 104 MMOL/L (ref 98–107)
CO2: 23 MMOL/L (ref 20–31)
COLOR: YELLOW
COMMENT UA: ABNORMAL
CREAT SERPL-MCNC: 0.62 MG/DL (ref 0.5–0.9)
CRYSTALS, UA: ABNORMAL /HPF
DIFFERENTIAL TYPE: ABNORMAL
EOSINOPHILS RELATIVE PERCENT: 0 % (ref 0–4)
EPITHELIAL CELLS UA: ABNORMAL /HPF
GFR AFRICAN AMERICAN: >60 ML/MIN
GFR NON-AFRICAN AMERICAN: >60 ML/MIN
GFR SERPL CREATININE-BSD FRML MDRD: ABNORMAL ML/MIN/{1.73_M2}
GFR SERPL CREATININE-BSD FRML MDRD: ABNORMAL ML/MIN/{1.73_M2}
GLUCOSE BLD-MCNC: 132 MG/DL (ref 70–99)
GLUCOSE URINE: NEGATIVE
HCT VFR BLD CALC: 39.4 % (ref 36–46)
HEMOGLOBIN: 13 G/DL (ref 12–16)
IMMATURE GRANULOCYTES: ABNORMAL %
KETONES, URINE: ABNORMAL
LEUKOCYTE ESTERASE, URINE: NEGATIVE
LYMPHOCYTES # BLD: 7 % (ref 24–44)
MCH RBC QN AUTO: 32.5 PG (ref 26–34)
MCHC RBC AUTO-ENTMCNC: 33.1 G/DL (ref 31–37)
MCV RBC AUTO: 98.1 FL (ref 80–100)
MONOCYTES # BLD: 1 % (ref 1–7)
MUCUS: ABNORMAL
NITRITE, URINE: NEGATIVE
NRBC AUTOMATED: ABNORMAL PER 100 WBC
OTHER OBSERVATIONS UA: ABNORMAL
PDW BLD-RTO: 13.1 % (ref 11.5–14.9)
PH UA: 6.5 (ref 5–8)
PLATELET # BLD: 258 K/UL (ref 150–450)
PLATELET ESTIMATE: ABNORMAL
PMV BLD AUTO: 8.9 FL (ref 6–12)
POTASSIUM SERPL-SCNC: 3.6 MMOL/L (ref 3.7–5.3)
PROTEIN UA: NEGATIVE
RBC # BLD: 4.01 M/UL (ref 4–5.2)
RBC # BLD: ABNORMAL 10*6/UL
RBC UA: ABNORMAL /HPF
RENAL EPITHELIAL, UA: ABNORMAL /HPF
SEG NEUTROPHILS: 91 % (ref 36–66)
SEGMENTED NEUTROPHILS ABSOLUTE COUNT: 10.6 K/UL (ref 1.3–9.1)
SODIUM BLD-SCNC: 142 MMOL/L (ref 135–144)
SPECIFIC GRAVITY UA: 1.06 (ref 1–1.03)
TRICHOMONAS: ABNORMAL
TURBIDITY: ABNORMAL
URINE HGB: NEGATIVE
UROBILINOGEN, URINE: NORMAL
WBC # BLD: 11.5 K/UL (ref 3.5–11)
WBC # BLD: ABNORMAL 10*3/UL
WBC UA: ABNORMAL /HPF
YEAST: ABNORMAL

## 2018-06-24 PROCEDURE — 87077 CULTURE AEROBIC IDENTIFY: CPT

## 2018-06-24 PROCEDURE — 6360000002 HC RX W HCPCS: Performed by: FAMILY MEDICINE

## 2018-06-24 PROCEDURE — 1200000000 HC SEMI PRIVATE

## 2018-06-24 PROCEDURE — 87186 SC STD MICRODIL/AGAR DIL: CPT

## 2018-06-24 PROCEDURE — 87086 URINE CULTURE/COLONY COUNT: CPT

## 2018-06-24 PROCEDURE — 6360000002 HC RX W HCPCS: Performed by: SURGERY

## 2018-06-24 PROCEDURE — 80048 BASIC METABOLIC PNL TOTAL CA: CPT

## 2018-06-24 PROCEDURE — 85025 COMPLETE CBC W/AUTO DIFF WBC: CPT

## 2018-06-24 PROCEDURE — 81001 URINALYSIS AUTO W/SCOPE: CPT

## 2018-06-24 PROCEDURE — 6360000002 HC RX W HCPCS: Performed by: EMERGENCY MEDICINE

## 2018-06-24 PROCEDURE — 2580000003 HC RX 258: Performed by: FAMILY MEDICINE

## 2018-06-24 PROCEDURE — 6360000004 HC RX CONTRAST MEDICATION: Performed by: FAMILY MEDICINE

## 2018-06-24 PROCEDURE — 36415 COLL VENOUS BLD VENIPUNCTURE: CPT

## 2018-06-24 RX ORDER — ONDANSETRON 2 MG/ML
4 INJECTION INTRAMUSCULAR; INTRAVENOUS ONCE
Status: COMPLETED | OUTPATIENT
Start: 2018-06-24 | End: 2018-06-24

## 2018-06-24 RX ORDER — FENTANYL CITRATE 50 UG/ML
100 INJECTION, SOLUTION INTRAMUSCULAR; INTRAVENOUS
Status: DISCONTINUED | OUTPATIENT
Start: 2018-06-24 | End: 2018-06-27 | Stop reason: HOSPADM

## 2018-06-24 RX ORDER — FENTANYL CITRATE 50 UG/ML
50 INJECTION, SOLUTION INTRAMUSCULAR; INTRAVENOUS
Status: DISCONTINUED | OUTPATIENT
Start: 2018-06-24 | End: 2018-06-24

## 2018-06-24 RX ORDER — SODIUM CHLORIDE 9 MG/ML
INJECTION, SOLUTION INTRAVENOUS CONTINUOUS
Status: DISCONTINUED | OUTPATIENT
Start: 2018-06-24 | End: 2018-06-26

## 2018-06-24 RX ORDER — CIPROFLOXACIN 2 MG/ML
400 INJECTION, SOLUTION INTRAVENOUS EVERY 12 HOURS
Status: DISCONTINUED | OUTPATIENT
Start: 2018-06-24 | End: 2018-06-27 | Stop reason: HOSPADM

## 2018-06-24 RX ORDER — OXYCODONE HYDROCHLORIDE AND ACETAMINOPHEN 5; 325 MG/1; MG/1
1 TABLET ORAL EVERY 4 HOURS PRN
Status: DISCONTINUED | OUTPATIENT
Start: 2018-06-24 | End: 2018-06-27 | Stop reason: HOSPADM

## 2018-06-24 RX ORDER — FENTANYL CITRATE 50 UG/ML
25 INJECTION, SOLUTION INTRAMUSCULAR; INTRAVENOUS
Status: DISCONTINUED | OUTPATIENT
Start: 2018-06-24 | End: 2018-06-27 | Stop reason: HOSPADM

## 2018-06-24 RX ORDER — FENTANYL CITRATE 50 UG/ML
50 INJECTION, SOLUTION INTRAMUSCULAR; INTRAVENOUS
Status: DISCONTINUED | OUTPATIENT
Start: 2018-06-24 | End: 2018-06-27 | Stop reason: HOSPADM

## 2018-06-24 RX ORDER — LORAZEPAM 2 MG/ML
1 INJECTION INTRAMUSCULAR EVERY 4 HOURS PRN
Status: DISCONTINUED | OUTPATIENT
Start: 2018-06-24 | End: 2018-06-27 | Stop reason: HOSPADM

## 2018-06-24 RX ORDER — ONDANSETRON 2 MG/ML
4 INJECTION INTRAMUSCULAR; INTRAVENOUS EVERY 4 HOURS PRN
Status: DISCONTINUED | OUTPATIENT
Start: 2018-06-24 | End: 2018-06-27 | Stop reason: HOSPADM

## 2018-06-24 RX ORDER — PANTOPRAZOLE SODIUM 40 MG/1
40 TABLET, DELAYED RELEASE ORAL
Status: DISCONTINUED | OUTPATIENT
Start: 2018-06-25 | End: 2018-06-25

## 2018-06-24 RX ORDER — OXYCODONE HYDROCHLORIDE AND ACETAMINOPHEN 5; 325 MG/1; MG/1
2 TABLET ORAL EVERY 4 HOURS PRN
Status: DISCONTINUED | OUTPATIENT
Start: 2018-06-24 | End: 2018-06-27 | Stop reason: HOSPADM

## 2018-06-24 RX ORDER — KETOROLAC TROMETHAMINE 30 MG/ML
30 INJECTION, SOLUTION INTRAMUSCULAR; INTRAVENOUS EVERY 6 HOURS PRN
Status: DISCONTINUED | OUTPATIENT
Start: 2018-06-24 | End: 2018-06-27 | Stop reason: HOSPADM

## 2018-06-24 RX ORDER — SODIUM CHLORIDE 0.9 % (FLUSH) 0.9 %
10 SYRINGE (ML) INJECTION PRN
Status: DISCONTINUED | OUTPATIENT
Start: 2018-06-24 | End: 2018-06-27 | Stop reason: HOSPADM

## 2018-06-24 RX ORDER — PROMETHAZINE HYDROCHLORIDE 25 MG/ML
6.25 INJECTION, SOLUTION INTRAMUSCULAR; INTRAVENOUS EVERY 6 HOURS PRN
Status: DISCONTINUED | OUTPATIENT
Start: 2018-06-24 | End: 2018-06-27 | Stop reason: HOSPADM

## 2018-06-24 RX ORDER — ACETAMINOPHEN 325 MG/1
650 TABLET ORAL EVERY 4 HOURS PRN
Status: DISCONTINUED | OUTPATIENT
Start: 2018-06-24 | End: 2018-06-27 | Stop reason: HOSPADM

## 2018-06-24 RX ORDER — SODIUM CHLORIDE 0.9 % (FLUSH) 0.9 %
10 SYRINGE (ML) INJECTION EVERY 12 HOURS SCHEDULED
Status: DISCONTINUED | OUTPATIENT
Start: 2018-06-24 | End: 2018-06-27 | Stop reason: HOSPADM

## 2018-06-24 RX ADMIN — FENTANYL CITRATE 100 MCG: 50 INJECTION INTRAMUSCULAR; INTRAVENOUS at 17:43

## 2018-06-24 RX ADMIN — CIPROFLOXACIN 400 MG: 2 INJECTION, SOLUTION INTRAVENOUS at 12:46

## 2018-06-24 RX ADMIN — FENTANYL CITRATE 50 MCG: 50 INJECTION INTRAMUSCULAR; INTRAVENOUS at 03:37

## 2018-06-24 RX ADMIN — FENTANYL CITRATE 100 MCG: 50 INJECTION INTRAMUSCULAR; INTRAVENOUS at 20:40

## 2018-06-24 RX ADMIN — SODIUM CHLORIDE: 9 INJECTION, SOLUTION INTRAVENOUS at 03:37

## 2018-06-24 RX ADMIN — SINCALIDE 1.16 MCG: 5 INJECTION, POWDER, LYOPHILIZED, FOR SOLUTION INTRAVENOUS at 13:12

## 2018-06-24 RX ADMIN — ONDANSETRON 4 MG: 2 INJECTION INTRAMUSCULAR; INTRAVENOUS at 22:45

## 2018-06-24 RX ADMIN — PROMETHAZINE HYDROCHLORIDE 6.25 MG: 25 INJECTION INTRAMUSCULAR; INTRAVENOUS at 14:16

## 2018-06-24 RX ADMIN — KETOROLAC TROMETHAMINE 30 MG: 30 INJECTION, SOLUTION INTRAMUSCULAR at 12:21

## 2018-06-24 RX ADMIN — FENTANYL CITRATE 50 MCG: 50 INJECTION INTRAMUSCULAR; INTRAVENOUS at 08:31

## 2018-06-24 RX ADMIN — LORAZEPAM 1 MG: 2 INJECTION INTRAMUSCULAR; INTRAVENOUS at 18:55

## 2018-06-24 RX ADMIN — ONDANSETRON 4 MG: 2 INJECTION INTRAMUSCULAR; INTRAVENOUS at 17:43

## 2018-06-24 RX ADMIN — SODIUM CHLORIDE: 9 INJECTION, SOLUTION INTRAVENOUS at 20:40

## 2018-06-24 RX ADMIN — ONDANSETRON 4 MG: 2 INJECTION INTRAMUSCULAR; INTRAVENOUS at 08:31

## 2018-06-24 RX ADMIN — FENTANYL CITRATE 50 MCG: 50 INJECTION INTRAMUSCULAR; INTRAVENOUS at 15:49

## 2018-06-24 RX ADMIN — ONDANSETRON 4 MG: 2 INJECTION INTRAMUSCULAR; INTRAVENOUS at 00:50

## 2018-06-24 RX ADMIN — FENTANYL CITRATE 100 MCG: 50 INJECTION INTRAMUSCULAR; INTRAVENOUS at 22:45

## 2018-06-24 ASSESSMENT — PAIN DESCRIPTION - PAIN TYPE
TYPE: ACUTE PAIN

## 2018-06-24 ASSESSMENT — PAIN SCALES - GENERAL
PAINLEVEL_OUTOF10: 8
PAINLEVEL_OUTOF10: 8
PAINLEVEL_OUTOF10: 5
PAINLEVEL_OUTOF10: 7
PAINLEVEL_OUTOF10: 9
PAINLEVEL_OUTOF10: 8
PAINLEVEL_OUTOF10: 9
PAINLEVEL_OUTOF10: 10
PAINLEVEL_OUTOF10: 8
PAINLEVEL_OUTOF10: 8
PAINLEVEL_OUTOF10: 7
PAINLEVEL_OUTOF10: 6
PAINLEVEL_OUTOF10: 5
PAINLEVEL_OUTOF10: 7

## 2018-06-24 ASSESSMENT — PAIN DESCRIPTION - ORIENTATION
ORIENTATION: UPPER;RIGHT
ORIENTATION: RIGHT;UPPER
ORIENTATION: RIGHT;UPPER

## 2018-06-24 ASSESSMENT — PAIN DESCRIPTION - LOCATION
LOCATION: ABDOMEN

## 2018-06-24 NOTE — PROGRESS NOTES
Admitted to room 2063 from ED per cart. Oriented to room and call light. Vitals and assessment completed. No distress noted.

## 2018-06-24 NOTE — CONSULTS
guarding present, no masses and no hernias  MUSCULOSKELETAL:  negative, Spine range of motion normal. Muscular strength intact. , there is not obvious somatic dysfunction, gait normal  NEUROLOGIC:  Mental Status Exam:  Level of Alertness:   alert  Orientation:   oriented to person, place, and time  pshych judgement and insight is normal, affect is very anxious and tearful  Rectal: deferred  Skin: no rashes, lesions, or ulcers    CBC:   Lab Results   Component Value Date    WBC 11.5 06/24/2018    RBC 4.01 06/24/2018    HGB 13.0 06/24/2018    HCT 39.4 06/24/2018    MCV 98.1 06/24/2018    MCH 32.5 06/24/2018    MCHC 33.1 06/24/2018    RDW 13.1 06/24/2018     06/24/2018    MPV 8.9 06/24/2018     CMP:    Lab Results   Component Value Date     06/24/2018    K 3.6 06/24/2018     06/24/2018    CO2 23 06/24/2018    BUN 15 06/24/2018    CREATININE 0.62 06/24/2018    GFRAA >60 06/24/2018    LABGLOM >60 06/24/2018    GLUCOSE 132 06/24/2018    PROT 7.1 06/23/2018    LABALBU 4.4 06/23/2018    CALCIUM 9.4 06/24/2018    BILITOT 0.84 06/23/2018    ALKPHOS 82 06/23/2018    AST 19 06/23/2018    ALT 12 06/23/2018     PT/INR:  No results found for: PROTIME, INR  AMYLASE:    Lab Results   Component Value Date    AMYLASE 76 04/17/2014     LIPASE:    Lab Results   Component Value Date    LIPASE 15 06/23/2018       Pertinent Radiology:   CT ABDOMEN PELVIS W IV CONTRAST [[id number]] Collected: 06/24/18 0000   Updated: 06/24/18 0104    Narrative:     EXAMINATION:  CT OF THE ABDOMEN AND PELVIS WITH CONTRAST 6/23/2018 11:54 pm    TECHNIQUE:  CT of the abdomen and pelvis was performed with the administration of  intravenous contrast. Multiplanar reformatted images are provided for review. Dose modulation, iterative reconstruction, and/or weight based adjustment of  the mA/kV was utilized to reduce the radiation dose to as low as reasonably  achievable. COMPARISON:  None.     HISTORY:  ORDERING SYSTEM PROVIDED HISTORY: CATE abd pain  TECHNOLOGIST PROVIDED HISTORY:  IV Only Contrast  Ordering Physician Provided Reason for Exam: RUQ ABD PAIN X1 DAY  Acuity: Acute  Type of Exam: Initial    FINDINGS:  Lower Chest: Lung bases are clear and the heart size is normal.  There is  mild air in the distal esophagus. Organs: The liver, spleen, pancreas, adrenal glands and kidneys are normal.  There are no calcified gallstones or pericholecystic fluid.  Bile ducts are  not dilated. GI/Bowel: There is a normal retrocecal appendix.  Unopacified bowel loops are  unremarkable.  No bowel obstruction. Pelvis: There is cystic enlargement of the left ovary with the largest cyst  measuring 4.1 cm.  There are several other clustered smaller cysts in the  left ovary.  The right ovary and uterus are unremarkable.  Urinary bladder is  normal.    Peritoneum/Retroperitoneum: There is no adenopathy, free air or free fluid. Bones/Soft Tissues: Degenerative disc disease at L5-S1 with broad-based mixed  protrusion causing a moderate central canal stenosis (axial image 89 - 91). No acute bone finding. Impression:     No definite etiology identified for right upper quadrant pain. There is cystic enlargement of the left ovary with the largest cyst measuring  up to 4.1 cm with other smaller cysts.  A septated cystic mass is not  excluded.  Follow-up pelvic ultrasound would be helpful in further  characterizing the left ovary. Broad-based mixed protrusion/ herniation at L5-S1 causing moderate central  canal stenosis. ASSESSMENT   Principal Problem:    Dysfunctional gallbladder  Active Problems:    GERD (gastroesophageal reflux disease)  Resolved Problems:    * No resolved hospital problems. *      PLAN    1. HIDA scan today with cck to rule out biliary dyskinesia.         Electronically signed by Rod House MD  on 6/24/2018 at 9:20 AM

## 2018-06-24 NOTE — ED NOTES
Pt states she's unable to give us a urine sample at this time. States her pain is back and that she is vomiting again. Dr. Severo Chin notified.  Pt to Eliud Gutierrez RN  06/23/18 9802

## 2018-06-24 NOTE — PROGRESS NOTES
Nutrition Assessment    Type and Reason for Visit: Positive Nutrition Screen (Unplanned weight loss and decreased appetite)    Nutrition Recommendations: Continue NPO. Consider alternative nutrition intervention if NPO longer than 5 days. Malnutrition Assessment:  · Malnutrition Status: Meets the criteria for severe malnutrition  · Context: Acute illness or injury  · Findings of the 6 clinical characteristics of malnutrition (Minimum of 2 out of 6 clinical characteristics is required to make the diagnosis of moderate or severe Protein Calorie Malnutrition based on AND/ASPEN Guidelines):  1. Energy Intake-Less than or equal to 50%, greater than or equal to 3 months    2. Weight Loss-10% loss or greater, in 3 months  3. Fat Loss-No significant subcutaneous fat loss,    4. Muscle Loss-No significant muscle mass loss,    5. Fluid Accumulation-No significant fluid accumulation, Extremities  6.  Strength-Not measured    Nutrition Diagnosis:   · Problem: Inadequate oral intake  · Etiology: related to Alteration in GI function (Gallbladder dysfunction)     Signs and symptoms:  as evidenced by Weight loss greater than or equal to 7.5% in 3 months, Diet history of poor intake, Intake 25-50%, Intake 0-25%, Nausea, Vomiting    Nutrition Assessment:  · Subjective Assessment: Patient reports no appetite for 3 months and believes she has lost 30 lbs. in one months related nausea, vomiting and abdominal pain. Patient thinks she weighs 115 lbs and not 128 lbs. Patient denied diarrhea or constipation.    · Wound Type: None  · Current Nutrition Therapies:  · Oral Diet Orders: NPO   · Oral Diet intake: NPO  · Anthropometric Measures:  · Ht: 5' 2\" (157.5 cm)   · Current Body Wt: 128 lb (58.1 kg) (patient believes this is incorrect. )  · Admission Body Wt: 128 lb (58.1 kg)  · Usual Body Wt: 148 lb (67.1 kg)  · % Weight Change: 13.5%,  3 months  · Ideal Body Wt: 110 lb (49.9 kg), % Ideal Body 116%  · BMI Classification: BMI

## 2018-06-24 NOTE — PROGRESS NOTES
Spoke with Carlos Whipple, house supervisor regarding STAT hyda scan Dr. Reyna Chowdary ordered for today. Dr. Reyna Chowdary would like to be called with these results.

## 2018-06-24 NOTE — H&P
3/13/17  Yes Historical Provider, MD   RA ALLERGY RELIEF 10 MG tablet  3/8/17  Yes Historical Provider, MD   gabapentin (NEURONTIN) 100 MG capsule  3/8/17  Yes Historical Provider, MD   benzonatate (TESSALON) 100 MG capsule  4/6/17  Yes Historical Provider, MD   promethazine (PHENERGAN) 25 MG tablet  1/2/17  Yes Historical Provider, MD   ALPRAZolam [place] [facility]) 0.25 MG tablet  12/20/16  Yes Historical Provider, MD   fluticasone (FLONASE) 50 MCG/ACT nasal spray  12/28/16  Yes Historical Provider, MD   ondansetron (ZOFRAN ODT) 4 MG disintegrating tablet Take 1 tablet by mouth every 8 hours as needed for Nausea 7/17/16  Yes Snow Dillard DO   omeprazole (PRILOSEC) 40 MG capsule TAKE ONE CAPSULE BY MOUTH DAILY 12/1/15  Yes Michael Patel MD   SYMBICORT 80-4.5 MCG/ACT AERO INHALE TWO PUFFS BY MOUTH TWICE A DAY 12/1/15  Yes Michael Patel MD   VENTOLIN  (90 BASE) MCG/ACT inhaler INHALE TWO PUFFS BY MOUTH EVERY 6 HOURS AS NEEDED FOR WHEEZING 12/1/15  Yes Michael Patel MD   ipratropium-albuterol (DUONEB) 0.5-2.5 (3) MG/3ML SOLN nebulizer solution Inhale 3 mLs into the lungs every 4 hours 10/28/15  Yes Michael Patel MD   buPROPion [facility]) 100 MG tablet Take 1.5 tablets by mouth 2 times daily 5/29/15  Yes Lane Sierra MD   traMADol (ULTRAM) 50 MG tablet Take 50 mg by mouth every 6 hours as needed. 3/12/18   Historical Provider, MD   ibuprofen (ADVIL;MOTRIN) 800 MG tablet Take 1 tablet by mouth every 8 hours as needed for Pain 4/10/17   LISANDRA Abarca - CNP   Nebulizers (AIRIAL COMPACT MINI NEBULIZER) MISC Use as directed Diagnosis: copd 10/28/15   Michael Patel MD        Allergies:       Augmentin [amoxicillin-pot clavulanate] and Codeine    Social History:     Tobacco:    reports that she has been smoking Cigarettes. She started smoking about 33 years ago. She has been smoking about 1.00 pack per day. She has never used smokeless tobacco.  Alcohol:      reports that she drinks alcohol.   Drug Use:  reports that she does not use drugs. Family History:     Family History   Problem Relation Age of Onset    High Blood Pressure Mother     Diabetes Mother     Stroke Maternal Grandmother 79       Review of Systems:     Positive and Negative as described in HPI    Constitutional:  negative for  fevers, chills, sweats, fatigue, and weight loss  HEENT:  negative for vision or hearing changes,   Respiratory:  negative for shortness of breath, cough, or congestion  Cardiovascular:  negative for  chest pain, palpitations  Gastrointestinal:  negative for nausea, vomiting, diarrhea, constipation, abdominal pain  Genitourinary:  negative for frequency, dysuria  Integument/Breast:  negative for rash, skin lesions  Musculoskeletal:  negative for muscle aches or joint pain  Neurological:  negative for headaches, dizziness, lightheadedness, numbness, pain and tingling extrimities  Behavior/Psych:  negative for depression and anxiety    Code Status:  Full Code    Physical Exam:     Vitals:  /75   Pulse 68   Temp 99 °F (37.2 °C) (Oral)   Resp 18   Ht 5' 2\" (1.575 m)   Wt 128 lb 1.4 oz (58.1 kg)   SpO2 99%   BMI 23.43 kg/m²   Temp (24hrs), Av.2 °F (36.8 °C), Min:97.3 °F (36.3 °C), Max:99 °F (37.2 °C)      General appearance - alert, well appearing, and in no acute distress  Mental status - oriented to person, place, and time with normal affect  Head - normocephalic and atraumatic  Eyes - pupils equal and reactive, extraocular eye movements intact, conjunctiva clear  Ears - hearing appears to be intact  Nose - no drainage noted  Mouth - mucous membranes moist  Neck - supple, no carotid bruits, thyroid not palpable  Chest - clear to auscultation, normal effort  Heart - normal rate, regular rhythm, no murmurs  Abdomen - soft, diffuse tenderness, decreased bowel sounds.   Neurological - normal speech, no focal findings or movement disorder noted, cranial nerves II through XII grossly intact  Extremities - Panel    Collection Time: 06/23/18  9:42 PM   Result Value Ref Range    Alb 4.4 3.5 - 5.2 g/dL    Alkaline Phosphatase 82 35 - 104 U/L    ALT 12 5 - 33 U/L    AST 19 <32 U/L    Total Bilirubin 0.84 0.3 - 1.2 mg/dL    Bilirubin, Direct 0.21 <0.31 mg/dL    Bilirubin, Indirect 0.63 0.00 - 1.00 mg/dL    Total Protein 7.1 6.4 - 8.3 g/dL    Globulin NOT REPORTED 1.5 - 3.8 g/dL    Albumin/Globulin Ratio NOT REPORTED 1.0 - 2.5   HCG Qualitative, Serum    Collection Time: 06/23/18  9:42 PM   Result Value Ref Range    hCG Qual NEGATIVE NEG   Urinalysis Reflex to Culture    Collection Time: 06/24/18  1:30 AM   Result Value Ref Range    Color, UA YELLOW YEL    Turbidity UA CLOUDY (A) CLEAR    Glucose, Ur NEGATIVE NEG    Bilirubin Urine NEGATIVE NEG    Ketones, Urine MOD (A) NEG    Specific Gravity, UA 1.065 (H) 1.000 - 1.030    Urine Hgb NEGATIVE NEG    pH, UA 6.5 5.0 - 8.0    Protein, UA NEGATIVE NEG    Urobilinogen, Urine Normal NORM    Nitrite, Urine NEGATIVE NEG    Leukocyte Esterase, Urine NEGATIVE NEG    Urinalysis Comments NOT REPORTED    Microscopic Urinalysis    Collection Time: 06/24/18  1:30 AM   Result Value Ref Range    -          WBC, UA 5 TO 10 /HPF    RBC, UA 0 TO 2 /HPF    Casts UA NOT REPORTED /LPF    Crystals UA NOT REPORTED NONE /HPF    Epithelial Cells UA 0 TO 2 /HPF    Renal Epithelial, Urine NOT REPORTED 0 /HPF    Bacteria, UA MANY (A) NONE    Mucus, UA NOT REPORTED NONE    Trichomonas, UA NOT REPORTED NONE    Amorphous, UA NOT REPORTED NONE    Other Observations UA NOT REPORTED NREQ    Yeast, UA NOT REPORTED NONE   CBC with DIFF    Collection Time: 06/24/18  6:09 AM   Result Value Ref Range    WBC 11.5 (H) 3.5 - 11.0 k/uL    RBC 4.01 4.0 - 5.2 m/uL    Hemoglobin 13.0 12.0 - 16.0 g/dL    Hematocrit 39.4 36 - 46 %    MCV 98.1 80 - 100 fL    MCH 32.5 26 - 34 pg    MCHC 33.1 31 - 37 g/dL    RDW 13.1 11.5 - 14.9 %    Platelets 116 828 - 432 k/uL    MPV 8.9 6.0 - 12.0 fL    NRBC Automated NOT REPORTED per 100

## 2018-06-24 NOTE — ED PROVIDER NOTES
Absolute Lymph # 0.90 (*)     All other components within normal limits   BASIC METABOLIC PANEL - Abnormal; Notable for the following:     Glucose 135 (*)     All other components within normal limits   MICROSCOPIC URINALYSIS - Abnormal; Notable for the following:     Bacteria, UA MANY (*)     All other components within normal limits   CBC WITH AUTO DIFFERENTIAL - Abnormal; Notable for the following:     WBC 11.5 (*)     Seg Neutrophils 91 (*)     Lymphocytes 7 (*)     Segs Absolute 10.60 (*)     Absolute Lymph # 0.80 (*)     All other components within normal limits   BASIC METABOLIC PANEL - Abnormal; Notable for the following:     Glucose 132 (*)     Potassium 3.6 (*)     All other components within normal limits   URINE CULTURE CLEAN CATCH   LIPASE   HEPATIC FUNCTION PANEL   HCG, SERUM, QUALITATIVE   CBC WITH AUTO DIFFERENTIAL   CBC   COMPREHENSIVE METABOLIC PANEL         EMERGENCY DEPARTMENT COURSE:   Medical Decision Making:  Right upper quadrant abdominal pain, concerning for gallbladder dysfunction and possible acute cholecystitis, we'll get CBC BMP LFTs and lipase urinalysis and pregnancy and CT scan and pelvis. Treat with Zofran fluid bolus and fentanyl. Patient has mild leukocytosis, no anemia, Stable urine creatinine, no Kidney dysfunction, no elevation of LFTs Or lipase. No evidence of urinary tract infection. Patient has persistent nausea and vomiting and persistent right upper quadrant abdominal pain, decision to admit for further evaluation of right upper quadrant abdominal pain, consider ultrasound and had a scan. Discussed case with Dr. Asad Mcfarlane who is covering for Dr. Micheal Cassidy, patient's PCP, who has accepted admission and request bridging orders place. CRITICAL CARE:       CONSULTS:  IP CONSULT TO GENERAL SURGERY  IP CONSULT TO GI      FINAL IMPRESSION      1. RUQ abdominal pain    2. Intractable vomiting with nausea, unspecified vomiting type    3.  Leukocytosis, unspecified type

## 2018-06-24 NOTE — PLAN OF CARE
Problem: Pain:  Goal: Pain level will decrease  Pain level will decrease   Outcome: Ongoing  Pain controlled with prn pain medications this shift. Problem: Falls - Risk of:  Goal: Will remain free from falls  Will remain free from falls   Outcome: Ongoing  Patient remains free from falls/injuries at this time. Call light within reach. Problem: Risk for Impaired Skin Integrity  Goal: Tissue integrity - skin and mucous membranes  Structural intactness and normal physiological function of skin and  mucous membranes. Outcome: Ongoing  Zero obvious new skin issues so far this shift.

## 2018-06-24 NOTE — PLAN OF CARE
Problem: Pain:  Goal: Control of acute pain  Control of acute pain   Outcome: Ongoing  Pt medicated with pain medication prn. Assessed all pain characteristics including level, type, location, frequency, and onset. Non-pharmacologic interventions offered to pt as well. Pt states pain is tolerable at this time. Will continue to monitor. Problem: Falls - Risk of:  Goal: Will remain free from falls  Will remain free from falls   Outcome: Met This Shift  No falls noted this shift. Patient ambulates independently without difficulty. Bed kept in low position. Safe environment maintained. Bedside table & call light in reach. Uses call light appropriately when needing assistance. Problem: Risk for Impaired Skin Integrity  Goal: Tissue integrity - skin and mucous membranes  Structural intactness and normal physiological function of skin and  mucous membranes. Outcome: Ongoing  Skin assessment performed. See head to toe assessment. Will continue to monitor.

## 2018-06-25 ENCOUNTER — APPOINTMENT (OUTPATIENT)
Dept: ULTRASOUND IMAGING | Age: 48
DRG: 263 | End: 2018-06-25
Payer: MEDICARE

## 2018-06-25 ENCOUNTER — APPOINTMENT (OUTPATIENT)
Dept: NUCLEAR MEDICINE | Age: 48
DRG: 263 | End: 2018-06-25
Payer: MEDICARE

## 2018-06-25 ENCOUNTER — ANESTHESIA EVENT (OUTPATIENT)
Dept: OPERATING ROOM | Age: 48
DRG: 263 | End: 2018-06-25
Payer: MEDICARE

## 2018-06-25 PROBLEM — E87.6 HYPOKALEMIA: Status: ACTIVE | Noted: 2018-06-25

## 2018-06-25 PROBLEM — F41.0 ANXIETY ATTACK: Status: ACTIVE | Noted: 2018-06-25

## 2018-06-25 PROBLEM — R10.11 RIGHT UPPER QUADRANT ABDOMINAL PAIN: Status: ACTIVE | Noted: 2018-06-25

## 2018-06-25 LAB
ABSOLUTE EOS #: 0 K/UL (ref 0–0.4)
ABSOLUTE IMMATURE GRANULOCYTE: ABNORMAL K/UL (ref 0–0.3)
ABSOLUTE LYMPH #: 1 K/UL (ref 1–4.8)
ABSOLUTE MONO #: 0.7 K/UL (ref 0.1–1.3)
ALBUMIN SERPL-MCNC: 3.7 G/DL (ref 3.5–5.2)
ALBUMIN/GLOBULIN RATIO: ABNORMAL (ref 1–2.5)
ALP BLD-CCNC: 62 U/L (ref 35–104)
ALT SERPL-CCNC: 9 U/L (ref 5–33)
ANION GAP SERPL CALCULATED.3IONS-SCNC: 12 MMOL/L (ref 9–17)
ANION GAP SERPL CALCULATED.3IONS-SCNC: 14 MMOL/L (ref 9–17)
AST SERPL-CCNC: 15 U/L
BASOPHILS # BLD: 1 % (ref 0–2)
BASOPHILS ABSOLUTE: 0 K/UL (ref 0–0.2)
BILIRUB SERPL-MCNC: 0.7 MG/DL (ref 0.3–1.2)
BUN BLDV-MCNC: 18 MG/DL (ref 6–20)
BUN BLDV-MCNC: 19 MG/DL (ref 6–20)
BUN/CREAT BLD: ABNORMAL (ref 9–20)
BUN/CREAT BLD: ABNORMAL (ref 9–20)
C-REACTIVE PROTEIN: 0.5 MG/L (ref 0–5)
CALCIUM SERPL-MCNC: 8.7 MG/DL (ref 8.6–10.4)
CALCIUM SERPL-MCNC: 9.2 MG/DL (ref 8.6–10.4)
CHLORIDE BLD-SCNC: 99 MMOL/L (ref 98–107)
CHLORIDE BLD-SCNC: 99 MMOL/L (ref 98–107)
CO2: 23 MMOL/L (ref 20–31)
CO2: 23 MMOL/L (ref 20–31)
CREAT SERPL-MCNC: 0.59 MG/DL (ref 0.5–0.9)
CREAT SERPL-MCNC: 0.69 MG/DL (ref 0.5–0.9)
DIFFERENTIAL TYPE: ABNORMAL
EOSINOPHILS RELATIVE PERCENT: 0 % (ref 0–4)
GFR AFRICAN AMERICAN: >60 ML/MIN
GFR AFRICAN AMERICAN: >60 ML/MIN
GFR NON-AFRICAN AMERICAN: >60 ML/MIN
GFR NON-AFRICAN AMERICAN: >60 ML/MIN
GFR SERPL CREATININE-BSD FRML MDRD: ABNORMAL ML/MIN/{1.73_M2}
GLUCOSE BLD-MCNC: 102 MG/DL (ref 70–99)
GLUCOSE BLD-MCNC: 103 MG/DL (ref 70–99)
HCT VFR BLD CALC: 35 % (ref 36–46)
HEMOGLOBIN: 11.9 G/DL (ref 12–16)
IMMATURE GRANULOCYTES: ABNORMAL %
LYMPHOCYTES # BLD: 11 % (ref 24–44)
MAGNESIUM: 2 MG/DL (ref 1.6–2.6)
MCH RBC QN AUTO: 33.7 PG (ref 26–34)
MCHC RBC AUTO-ENTMCNC: 34.1 G/DL (ref 31–37)
MCV RBC AUTO: 98.8 FL (ref 80–100)
MONOCYTES # BLD: 7 % (ref 1–7)
NRBC AUTOMATED: ABNORMAL PER 100 WBC
PDW BLD-RTO: 13.1 % (ref 11.5–14.9)
PLATELET # BLD: 213 K/UL (ref 150–450)
PLATELET ESTIMATE: ABNORMAL
PMV BLD AUTO: 9 FL (ref 6–12)
POTASSIUM SERPL-SCNC: 3.3 MMOL/L (ref 3.7–5.3)
POTASSIUM SERPL-SCNC: 3.5 MMOL/L (ref 3.7–5.3)
RBC # BLD: 3.54 M/UL (ref 4–5.2)
RBC # BLD: ABNORMAL 10*6/UL
SEG NEUTROPHILS: 81 % (ref 36–66)
SEGMENTED NEUTROPHILS ABSOLUTE COUNT: 8.2 K/UL (ref 1.3–9.1)
SODIUM BLD-SCNC: 134 MMOL/L (ref 135–144)
SODIUM BLD-SCNC: 136 MMOL/L (ref 135–144)
TOTAL PROTEIN: 6.1 G/DL (ref 6.4–8.3)
WBC # BLD: 9.9 K/UL (ref 3.5–11)
WBC # BLD: ABNORMAL 10*3/UL

## 2018-06-25 PROCEDURE — 80048 BASIC METABOLIC PNL TOTAL CA: CPT

## 2018-06-25 PROCEDURE — 83735 ASSAY OF MAGNESIUM: CPT

## 2018-06-25 PROCEDURE — 2580000003 HC RX 258: Performed by: FAMILY MEDICINE

## 2018-06-25 PROCEDURE — 6360000002 HC RX W HCPCS: Performed by: SURGERY

## 2018-06-25 PROCEDURE — 6360000002 HC RX W HCPCS: Performed by: FAMILY MEDICINE

## 2018-06-25 PROCEDURE — 2580000003 HC RX 258: Performed by: SURGERY

## 2018-06-25 PROCEDURE — 3430000000 HC RX DIAGNOSTIC RADIOPHARMACEUTICAL: Performed by: SURGERY

## 2018-06-25 PROCEDURE — C9113 INJ PANTOPRAZOLE SODIUM, VIA: HCPCS | Performed by: FAMILY MEDICINE

## 2018-06-25 PROCEDURE — 6370000000 HC RX 637 (ALT 250 FOR IP): Performed by: FAMILY MEDICINE

## 2018-06-25 PROCEDURE — A9537 TC99M MEBROFENIN: HCPCS | Performed by: SURGERY

## 2018-06-25 PROCEDURE — 76705 ECHO EXAM OF ABDOMEN: CPT

## 2018-06-25 PROCEDURE — 86140 C-REACTIVE PROTEIN: CPT

## 2018-06-25 PROCEDURE — 93005 ELECTROCARDIOGRAM TRACING: CPT

## 2018-06-25 PROCEDURE — 85025 COMPLETE CBC W/AUTO DIFF WBC: CPT

## 2018-06-25 PROCEDURE — 1200000000 HC SEMI PRIVATE

## 2018-06-25 PROCEDURE — 36415 COLL VENOUS BLD VENIPUNCTURE: CPT

## 2018-06-25 PROCEDURE — 6370000000 HC RX 637 (ALT 250 FOR IP): Performed by: SURGERY

## 2018-06-25 PROCEDURE — 80053 COMPREHEN METABOLIC PANEL: CPT

## 2018-06-25 PROCEDURE — 78227 HEPATOBIL SYST IMAGE W/DRUG: CPT

## 2018-06-25 RX ORDER — MAGNESIUM SULFATE 1 G/100ML
1 INJECTION INTRAVENOUS PRN
Status: DISCONTINUED | OUTPATIENT
Start: 2018-06-25 | End: 2018-06-27 | Stop reason: HOSPADM

## 2018-06-25 RX ORDER — 0.9 % SODIUM CHLORIDE 0.9 %
10 VIAL (ML) INJECTION DAILY
Status: DISCONTINUED | OUTPATIENT
Start: 2018-06-25 | End: 2018-06-27 | Stop reason: HOSPADM

## 2018-06-25 RX ORDER — POTASSIUM CHLORIDE 20 MEQ/1
40 TABLET, EXTENDED RELEASE ORAL PRN
Status: DISCONTINUED | OUTPATIENT
Start: 2018-06-25 | End: 2018-06-27 | Stop reason: HOSPADM

## 2018-06-25 RX ORDER — SODIUM CHLORIDE 0.9 % (FLUSH) 0.9 %
10 SYRINGE (ML) INJECTION PRN
Status: DISCONTINUED | OUTPATIENT
Start: 2018-06-25 | End: 2018-06-27 | Stop reason: HOSPADM

## 2018-06-25 RX ORDER — POTASSIUM CHLORIDE 7.45 MG/ML
10 INJECTION INTRAVENOUS PRN
Status: DISCONTINUED | OUTPATIENT
Start: 2018-06-25 | End: 2018-06-27 | Stop reason: HOSPADM

## 2018-06-25 RX ORDER — PANTOPRAZOLE SODIUM 40 MG/10ML
40 INJECTION, POWDER, LYOPHILIZED, FOR SOLUTION INTRAVENOUS DAILY
Status: DISCONTINUED | OUTPATIENT
Start: 2018-06-25 | End: 2018-06-27 | Stop reason: HOSPADM

## 2018-06-25 RX ORDER — POTASSIUM CHLORIDE 20MEQ/15ML
40 LIQUID (ML) ORAL PRN
Status: DISCONTINUED | OUTPATIENT
Start: 2018-06-25 | End: 2018-06-27 | Stop reason: HOSPADM

## 2018-06-25 RX ADMIN — CIPROFLOXACIN 400 MG: 2 INJECTION, SOLUTION INTRAVENOUS at 01:13

## 2018-06-25 RX ADMIN — LORAZEPAM 1 MG: 2 INJECTION INTRAMUSCULAR; INTRAVENOUS at 08:46

## 2018-06-25 RX ADMIN — Medication 10 ML: at 10:54

## 2018-06-25 RX ADMIN — KETOROLAC TROMETHAMINE 30 MG: 30 INJECTION, SOLUTION INTRAMUSCULAR at 17:41

## 2018-06-25 RX ADMIN — PANTOPRAZOLE SODIUM 40 MG: 40 TABLET, DELAYED RELEASE ORAL at 05:44

## 2018-06-25 RX ADMIN — ONDANSETRON 4 MG: 2 INJECTION INTRAMUSCULAR; INTRAVENOUS at 08:46

## 2018-06-25 RX ADMIN — FENTANYL CITRATE 100 MCG: 50 INJECTION INTRAMUSCULAR; INTRAVENOUS at 20:28

## 2018-06-25 RX ADMIN — PROMETHAZINE HYDROCHLORIDE 6.25 MG: 25 INJECTION INTRAMUSCULAR; INTRAVENOUS at 05:51

## 2018-06-25 RX ADMIN — OXYCODONE HYDROCHLORIDE AND ACETAMINOPHEN 2 TABLET: 5; 325 TABLET ORAL at 13:28

## 2018-06-25 RX ADMIN — CIPROFLOXACIN 400 MG: 2 INJECTION, SOLUTION INTRAVENOUS at 13:28

## 2018-06-25 RX ADMIN — PANTOPRAZOLE SODIUM 40 MG: 40 INJECTION, POWDER, FOR SOLUTION INTRAVENOUS at 08:45

## 2018-06-25 RX ADMIN — FENTANYL CITRATE 100 MCG: 50 INJECTION INTRAMUSCULAR; INTRAVENOUS at 01:25

## 2018-06-25 RX ADMIN — Medication 10 ML: at 08:46

## 2018-06-25 RX ADMIN — ENOXAPARIN SODIUM 40 MG: 40 INJECTION SUBCUTANEOUS at 08:46

## 2018-06-25 RX ADMIN — KETOROLAC TROMETHAMINE 30 MG: 30 INJECTION, SOLUTION INTRAMUSCULAR at 06:17

## 2018-06-25 RX ADMIN — PROMETHAZINE HYDROCHLORIDE 6.25 MG: 25 INJECTION INTRAMUSCULAR; INTRAVENOUS at 14:42

## 2018-06-25 RX ADMIN — MEBROFENIN 5.6 MILLICURIE: 45 INJECTION, POWDER, LYOPHILIZED, FOR SOLUTION INTRAVENOUS at 10:54

## 2018-06-25 RX ADMIN — LORAZEPAM 1 MG: 2 INJECTION INTRAMUSCULAR; INTRAVENOUS at 17:42

## 2018-06-25 ASSESSMENT — PAIN SCALES - GENERAL
PAINLEVEL_OUTOF10: 7
PAINLEVEL_OUTOF10: 6
PAINLEVEL_OUTOF10: 5
PAINLEVEL_OUTOF10: 7

## 2018-06-25 ASSESSMENT — LIFESTYLE VARIABLES: SMOKING_STATUS: 1

## 2018-06-25 ASSESSMENT — COPD QUESTIONNAIRES: CAT_SEVERITY: NO INTERVAL CHANGE

## 2018-06-25 ASSESSMENT — PAIN DESCRIPTION - PAIN TYPE
TYPE: ACUTE PAIN
TYPE: ACUTE PAIN

## 2018-06-25 ASSESSMENT — ENCOUNTER SYMPTOMS: STRIDOR: 0

## 2018-06-25 ASSESSMENT — PAIN DESCRIPTION - LOCATION
LOCATION: ABDOMEN

## 2018-06-25 NOTE — PROGRESS NOTES
Packs/day: 1.00     Types: Cigarettes     Start date: 6/24/1985    Smokeless tobacco: Never Used    Alcohol use Yes      Comment: once or twice a month, 3-4 drinks    Drug use: No    Sexual activity: No     Other Topics Concern    Not on file     Social History Narrative    Lives with son and his boyfriend       Labs--Reviewed:    Hematology:  Recent Labs      06/23/18 2142 06/24/18 0609 06/25/18 0630  06/25/18   0844   WBC  12.3*  11.5*  9.9   --    RBC  4.04  4.01  3.54*   --    HGB  13.4  13.0  11.9*   --    HCT  39.6  39.4  35.0*   --    MCV  98.0  98.1  98.8   --    MCH  33.2  32.5  33.7   --    MCHC  33.9  33.1  34.1   --    RDW  13.1  13.1  13.1   --    PLT  269  258  213   --    MPV  9.0  8.9  9.0   --    CRP   --    --    --   0.5     Chemistry:  Recent Labs      06/24/18   0609 06/25/18   0630  06/25/18   0844   NA  142  134*  136   K  3.6*  3.3*  3.5*   CL  104  99  99   CO2  23  23  23   GLUCOSE  132*  103*  102*   BUN  15  18  19   CREATININE  0.62  0.59  0.69   MG   --    --   2.0   ANIONGAP  15  12  14   LABGLOM  >60  >60  >60   GFRAA  >60  >60  >60   CALCIUM  9.4  8.7  9.2     Recent Labs      06/23/18 2142 06/25/18   0630   PROT  7.1  6.1*   LABALBU  4.4  3.7   AST  19  15   ALT  12  9   ALKPHOS  82  62   BILITOT  0.84  0.70   BILIDIR  0.21   --    LIPASE  15   --        No results for input(s): POCGLU in the last 72 hours. Lab Results   Component Value Date/Time    SPECIAL NOT REPORTED 06/24/2018 01:39 AM     Lab Results   Component Value Date/Time    CULTURE (A) 06/24/2018 01:39 AM     LACTOSE FERMENTING GRAM NEGATIVE RODS >[id number] CFU/ML         I/O (24Hr): Intake/Output Summary (Last 24 hours) at 06/25/18 0943  Last data filed at 06/25/18 0547   Gross per 24 hour   Intake             2041 ml   Output              900 ml   Net             1141 ml       Imaging (48Hr)--Reviewed:  No results found.     Echo/Stress:  @[facility]@        Physical Examination:        Vitals:  BP 6/25/2018 at 9:43 AM

## 2018-06-25 NOTE — FLOWSHEET NOTE
06/25/18 1840   Encounter Summary   Services provided to: Patient   Referral/Consult From: Rounding   Complexity of Encounter Low   Length of Encounter 15 minutes   Spiritual/Scientologist   Type Spiritual support   Assessment Sleeping   Intervention Prayer

## 2018-06-25 NOTE — PROGRESS NOTES
Dr. Tarun Byrd phoned in. Plan on earlene tomorrow.  NPO after MN, hold blood thinner in am. Held Lovenox in [facility] 6/25

## 2018-06-26 ENCOUNTER — ANESTHESIA (OUTPATIENT)
Dept: OPERATING ROOM | Age: 48
DRG: 263 | End: 2018-06-26
Payer: MEDICARE

## 2018-06-26 VITALS
TEMPERATURE: 98.4 F | RESPIRATION RATE: 15 BRPM | SYSTOLIC BLOOD PRESSURE: 87 MMHG | DIASTOLIC BLOOD PRESSURE: 49 MMHG | OXYGEN SATURATION: 99 %

## 2018-06-26 LAB
ABSOLUTE EOS #: 0 K/UL (ref 0–0.4)
ABSOLUTE IMMATURE GRANULOCYTE: ABNORMAL K/UL (ref 0–0.3)
ABSOLUTE LYMPH #: 1.6 K/UL (ref 1–4.8)
ABSOLUTE MONO #: 0.7 K/UL (ref 0.1–1.3)
ALBUMIN SERPL-MCNC: 3.4 G/DL (ref 3.5–5.2)
ALBUMIN/GLOBULIN RATIO: ABNORMAL (ref 1–2.5)
ALP BLD-CCNC: 55 U/L (ref 35–104)
ALT SERPL-CCNC: 8 U/L (ref 5–33)
ANION GAP SERPL CALCULATED.3IONS-SCNC: 10 MMOL/L (ref 9–17)
AST SERPL-CCNC: 14 U/L
BASOPHILS # BLD: 1 % (ref 0–2)
BASOPHILS ABSOLUTE: 0.1 K/UL (ref 0–0.2)
BILIRUB SERPL-MCNC: 0.65 MG/DL (ref 0.3–1.2)
BUN BLDV-MCNC: 14 MG/DL (ref 6–20)
BUN/CREAT BLD: ABNORMAL (ref 9–20)
CALCIUM SERPL-MCNC: 8.3 MG/DL (ref 8.6–10.4)
CHLORIDE BLD-SCNC: 101 MMOL/L (ref 98–107)
CO2: 25 MMOL/L (ref 20–31)
CREAT SERPL-MCNC: 0.54 MG/DL (ref 0.5–0.9)
CULTURE: ABNORMAL
DIFFERENTIAL TYPE: ABNORMAL
EKG ATRIAL RATE: 57 BPM
EKG P AXIS: -21 DEGREES
EKG P-R INTERVAL: 96 MS
EKG Q-T INTERVAL: 438 MS
EKG QRS DURATION: 82 MS
EKG QTC CALCULATION (BAZETT): 426 MS
EKG R AXIS: 77 DEGREES
EKG T AXIS: 49 DEGREES
EKG VENTRICULAR RATE: 57 BPM
EOSINOPHILS RELATIVE PERCENT: 1 % (ref 0–4)
GFR AFRICAN AMERICAN: >60 ML/MIN
GFR NON-AFRICAN AMERICAN: >60 ML/MIN
GFR SERPL CREATININE-BSD FRML MDRD: ABNORMAL ML/MIN/{1.73_M2}
GFR SERPL CREATININE-BSD FRML MDRD: ABNORMAL ML/MIN/{1.73_M2}
GLUCOSE BLD-MCNC: 103 MG/DL (ref 70–99)
HCG(URINE) PREGNANCY TEST: NEGATIVE
HCT VFR BLD CALC: 35.8 % (ref 36–46)
HEMOGLOBIN: 12.2 G/DL (ref 12–16)
IMMATURE GRANULOCYTES: ABNORMAL %
LYMPHOCYTES # BLD: 20 % (ref 24–44)
Lab: ABNORMAL
MCH RBC QN AUTO: 33.5 PG (ref 26–34)
MCHC RBC AUTO-ENTMCNC: 34.1 G/DL (ref 31–37)
MCV RBC AUTO: 98.5 FL (ref 80–100)
MONOCYTES # BLD: 9 % (ref 1–7)
NRBC AUTOMATED: ABNORMAL PER 100 WBC
ORGANISM: ABNORMAL
PDW BLD-RTO: 13 % (ref 11.5–14.9)
PLATELET # BLD: 203 K/UL (ref 150–450)
PLATELET ESTIMATE: ABNORMAL
PMV BLD AUTO: 9.1 FL (ref 6–12)
POTASSIUM SERPL-SCNC: 3.4 MMOL/L (ref 3.7–5.3)
RBC # BLD: 3.63 M/UL (ref 4–5.2)
RBC # BLD: ABNORMAL 10*6/UL
SEG NEUTROPHILS: 69 % (ref 36–66)
SEGMENTED NEUTROPHILS ABSOLUTE COUNT: 5.4 K/UL (ref 1.3–9.1)
SODIUM BLD-SCNC: 136 MMOL/L (ref 135–144)
SPECIMEN DESCRIPTION: ABNORMAL
STATUS: ABNORMAL
TOTAL PROTEIN: 5.5 G/DL (ref 6.4–8.3)
WBC # BLD: 7.8 K/UL (ref 3.5–11)
WBC # BLD: ABNORMAL 10*3/UL

## 2018-06-26 PROCEDURE — 2580000003 HC RX 258: Performed by: SURGERY

## 2018-06-26 PROCEDURE — 80053 COMPREHEN METABOLIC PANEL: CPT

## 2018-06-26 PROCEDURE — 84703 CHORIONIC GONADOTROPIN ASSAY: CPT

## 2018-06-26 PROCEDURE — C9113 INJ PANTOPRAZOLE SODIUM, VIA: HCPCS | Performed by: FAMILY MEDICINE

## 2018-06-26 PROCEDURE — 0FT44ZZ RESECTION OF GALLBLADDER, PERCUTANEOUS ENDOSCOPIC APPROACH: ICD-10-PCS | Performed by: SURGERY

## 2018-06-26 PROCEDURE — 2580000003 HC RX 258: Performed by: ANESTHESIOLOGY

## 2018-06-26 PROCEDURE — 6370000000 HC RX 637 (ALT 250 FOR IP): Performed by: FAMILY MEDICINE

## 2018-06-26 PROCEDURE — 6360000002 HC RX W HCPCS: Performed by: SURGERY

## 2018-06-26 PROCEDURE — 3600000009 HC SURGERY ROBOT BASE: Performed by: SURGERY

## 2018-06-26 PROCEDURE — 3600000019 HC SURGERY ROBOT ADDTL 15MIN: Performed by: SURGERY

## 2018-06-26 PROCEDURE — 2500000003 HC RX 250 WO HCPCS: Performed by: ANESTHESIOLOGY

## 2018-06-26 PROCEDURE — 2580000003 HC RX 258: Performed by: FAMILY MEDICINE

## 2018-06-26 PROCEDURE — 99254 IP/OBS CNSLTJ NEW/EST MOD 60: CPT | Performed by: INTERNAL MEDICINE

## 2018-06-26 PROCEDURE — 7100000001 HC PACU RECOVERY - ADDTL 15 MIN: Performed by: SURGERY

## 2018-06-26 PROCEDURE — 3700000001 HC ADD 15 MINUTES (ANESTHESIA): Performed by: SURGERY

## 2018-06-26 PROCEDURE — 7100000000 HC PACU RECOVERY - FIRST 15 MIN: Performed by: SURGERY

## 2018-06-26 PROCEDURE — A6402 STERILE GAUZE <= 16 SQ IN: HCPCS | Performed by: SURGERY

## 2018-06-26 PROCEDURE — 6370000000 HC RX 637 (ALT 250 FOR IP): Performed by: SURGERY

## 2018-06-26 PROCEDURE — 3700000000 HC ANESTHESIA ATTENDED CARE: Performed by: SURGERY

## 2018-06-26 PROCEDURE — 1200000000 HC SEMI PRIVATE

## 2018-06-26 PROCEDURE — 6360000002 HC RX W HCPCS: Performed by: ANESTHESIOLOGY

## 2018-06-26 PROCEDURE — 6360000002 HC RX W HCPCS: Performed by: FAMILY MEDICINE

## 2018-06-26 PROCEDURE — S2900 ROBOTIC SURGICAL SYSTEM: HCPCS | Performed by: SURGERY

## 2018-06-26 PROCEDURE — 87086 URINE CULTURE/COLONY COUNT: CPT

## 2018-06-26 PROCEDURE — 2500000003 HC RX 250 WO HCPCS: Performed by: SURGERY

## 2018-06-26 PROCEDURE — 88304 TISSUE EXAM BY PATHOLOGIST: CPT

## 2018-06-26 PROCEDURE — 8E0W4CZ ROBOTIC ASSISTED PROCEDURE OF TRUNK REGION, PERCUTANEOUS ENDOSCOPIC APPROACH: ICD-10-PCS | Performed by: SURGERY

## 2018-06-26 PROCEDURE — 36415 COLL VENOUS BLD VENIPUNCTURE: CPT

## 2018-06-26 PROCEDURE — 85025 COMPLETE CBC W/AUTO DIFF WBC: CPT

## 2018-06-26 RX ORDER — FENTANYL CITRATE 50 UG/ML
INJECTION, SOLUTION INTRAMUSCULAR; INTRAVENOUS PRN
Status: DISCONTINUED | OUTPATIENT
Start: 2018-06-26 | End: 2018-06-26 | Stop reason: SDUPTHER

## 2018-06-26 RX ORDER — ARIPIPRAZOLE 10 MG/1
10 TABLET ORAL DAILY
Status: DISCONTINUED | OUTPATIENT
Start: 2018-06-26 | End: 2018-06-27 | Stop reason: HOSPADM

## 2018-06-26 RX ORDER — MORPHINE SULFATE 2 MG/ML
2 INJECTION, SOLUTION INTRAMUSCULAR; INTRAVENOUS EVERY 5 MIN PRN
Status: DISCONTINUED | OUTPATIENT
Start: 2018-06-26 | End: 2018-06-26 | Stop reason: HOSPADM

## 2018-06-26 RX ORDER — OXYCODONE HYDROCHLORIDE AND ACETAMINOPHEN 5; 325 MG/1; MG/1
1 TABLET ORAL EVERY 4 HOURS PRN
Status: DISCONTINUED | OUTPATIENT
Start: 2018-06-26 | End: 2018-06-27 | Stop reason: HOSPADM

## 2018-06-26 RX ORDER — DIPHENHYDRAMINE HYDROCHLORIDE 50 MG/ML
12.5 INJECTION INTRAMUSCULAR; INTRAVENOUS
Status: DISCONTINUED | OUTPATIENT
Start: 2018-06-26 | End: 2018-06-26 | Stop reason: HOSPADM

## 2018-06-26 RX ORDER — BUPIVACAINE HYDROCHLORIDE 2.5 MG/ML
INJECTION, SOLUTION EPIDURAL; INFILTRATION; INTRACAUDAL PRN
Status: DISCONTINUED | OUTPATIENT
Start: 2018-06-26 | End: 2018-06-26 | Stop reason: HOSPADM

## 2018-06-26 RX ORDER — LACTOBACILLUS RHAMNOSUS GG 10B CELL
1 CAPSULE ORAL
Status: DISCONTINUED | OUTPATIENT
Start: 2018-06-27 | End: 2018-06-27 | Stop reason: HOSPADM

## 2018-06-26 RX ORDER — SODIUM CHLORIDE, SODIUM LACTATE, POTASSIUM CHLORIDE, CALCIUM CHLORIDE 600; 310; 30; 20 MG/100ML; MG/100ML; MG/100ML; MG/100ML
INJECTION, SOLUTION INTRAVENOUS CONTINUOUS PRN
Status: DISCONTINUED | OUTPATIENT
Start: 2018-06-26 | End: 2018-06-26 | Stop reason: SDUPTHER

## 2018-06-26 RX ORDER — ALBUTEROL SULFATE 2.5 MG/3ML
2.5 SOLUTION RESPIRATORY (INHALATION) EVERY 6 HOURS PRN
Status: DISCONTINUED | OUTPATIENT
Start: 2018-06-26 | End: 2018-06-27 | Stop reason: HOSPADM

## 2018-06-26 RX ORDER — MIDAZOLAM HYDROCHLORIDE 1 MG/ML
INJECTION INTRAMUSCULAR; INTRAVENOUS PRN
Status: DISCONTINUED | OUTPATIENT
Start: 2018-06-26 | End: 2018-06-26 | Stop reason: SDUPTHER

## 2018-06-26 RX ORDER — BENZONATATE 100 MG/1
100 CAPSULE ORAL 3 TIMES DAILY PRN
Status: DISCONTINUED | OUTPATIENT
Start: 2018-06-26 | End: 2018-06-27 | Stop reason: HOSPADM

## 2018-06-26 RX ORDER — ONDANSETRON 2 MG/ML
4 INJECTION INTRAMUSCULAR; INTRAVENOUS
Status: COMPLETED | OUTPATIENT
Start: 2018-06-26 | End: 2018-06-26

## 2018-06-26 RX ORDER — BUPROPION HYDROCHLORIDE 75 MG/1
150 TABLET ORAL 2 TIMES DAILY
Status: DISCONTINUED | OUTPATIENT
Start: 2018-06-26 | End: 2018-06-27 | Stop reason: HOSPADM

## 2018-06-26 RX ORDER — ROCURONIUM BROMIDE 10 MG/ML
INJECTION, SOLUTION INTRAVENOUS PRN
Status: DISCONTINUED | OUTPATIENT
Start: 2018-06-26 | End: 2018-06-26 | Stop reason: SDUPTHER

## 2018-06-26 RX ORDER — BUSPIRONE HYDROCHLORIDE 10 MG/1
30 TABLET ORAL DAILY
Status: DISCONTINUED | OUTPATIENT
Start: 2018-06-26 | End: 2018-06-27 | Stop reason: HOSPADM

## 2018-06-26 RX ORDER — PROPOFOL 10 MG/ML
INJECTION, EMULSION INTRAVENOUS PRN
Status: DISCONTINUED | OUTPATIENT
Start: 2018-06-26 | End: 2018-06-26 | Stop reason: SDUPTHER

## 2018-06-26 RX ORDER — GABAPENTIN 100 MG/1
100 CAPSULE ORAL 3 TIMES DAILY
Status: DISCONTINUED | OUTPATIENT
Start: 2018-06-26 | End: 2018-06-27 | Stop reason: HOSPADM

## 2018-06-26 RX ORDER — MEPERIDINE HYDROCHLORIDE 50 MG/ML
12.5 INJECTION INTRAMUSCULAR; INTRAVENOUS; SUBCUTANEOUS EVERY 5 MIN PRN
Status: DISCONTINUED | OUTPATIENT
Start: 2018-06-26 | End: 2018-06-26 | Stop reason: HOSPADM

## 2018-06-26 RX ORDER — LABETALOL HYDROCHLORIDE 5 MG/ML
5 INJECTION, SOLUTION INTRAVENOUS EVERY 10 MIN PRN
Status: DISCONTINUED | OUTPATIENT
Start: 2018-06-26 | End: 2018-06-26 | Stop reason: HOSPADM

## 2018-06-26 RX ORDER — METOPROLOL TARTRATE 5 MG/5ML
INJECTION INTRAVENOUS PRN
Status: DISCONTINUED | OUTPATIENT
Start: 2018-06-26 | End: 2018-06-26 | Stop reason: SDUPTHER

## 2018-06-26 RX ORDER — DEXTROSE AND SODIUM CHLORIDE 5; .9 G/100ML; G/100ML
INJECTION, SOLUTION INTRAVENOUS CONTINUOUS
Status: DISCONTINUED | OUTPATIENT
Start: 2018-06-26 | End: 2018-06-27 | Stop reason: HOSPADM

## 2018-06-26 RX ADMIN — FENTANYL CITRATE 100 MCG: 50 INJECTION INTRAMUSCULAR; INTRAVENOUS at 14:52

## 2018-06-26 RX ADMIN — DEXTROSE AND SODIUM CHLORIDE: 5; 900 INJECTION, SOLUTION INTRAVENOUS at 20:02

## 2018-06-26 RX ADMIN — FENTANYL CITRATE 100 MCG: 50 INJECTION INTRAMUSCULAR; INTRAVENOUS at 20:00

## 2018-06-26 RX ADMIN — METOROPROLOL TARTRATE 1.5 MG: 5 INJECTION, SOLUTION INTRAVENOUS at 16:59

## 2018-06-26 RX ADMIN — MOMETASONE FUROATE AND FORMOTEROL FUMARATE DIHYDRATE 2 PUFF: 200; 5 AEROSOL RESPIRATORY (INHALATION) at 20:30

## 2018-06-26 RX ADMIN — CIPROFLOXACIN 400 MG: 2 INJECTION, SOLUTION INTRAVENOUS at 00:17

## 2018-06-26 RX ADMIN — ROCURONIUM BROMIDE 30 MG: 10 INJECTION INTRAVENOUS at 16:33

## 2018-06-26 RX ADMIN — SODIUM CHLORIDE: 9 INJECTION, SOLUTION INTRAVENOUS at 04:49

## 2018-06-26 RX ADMIN — LORAZEPAM 1 MG: 2 INJECTION INTRAMUSCULAR; INTRAVENOUS at 08:36

## 2018-06-26 RX ADMIN — PROMETHAZINE HYDROCHLORIDE 6.25 MG: 25 INJECTION INTRAMUSCULAR; INTRAVENOUS at 04:44

## 2018-06-26 RX ADMIN — Medication 10 ML: at 14:52

## 2018-06-26 RX ADMIN — Medication 0.5 MG: at 18:53

## 2018-06-26 RX ADMIN — MIDAZOLAM 2 MG: 1 INJECTION INTRAMUSCULAR; INTRAVENOUS at 16:33

## 2018-06-26 RX ADMIN — FENTANYL CITRATE 100 MCG: 50 INJECTION, SOLUTION INTRAMUSCULAR; INTRAVENOUS at 16:33

## 2018-06-26 RX ADMIN — BUPROPION HYDROCHLORIDE 150 MG: 75 TABLET, FILM COATED ORAL at 20:30

## 2018-06-26 RX ADMIN — FENTANYL CITRATE 100 MCG: 50 INJECTION, SOLUTION INTRAMUSCULAR; INTRAVENOUS at 16:55

## 2018-06-26 RX ADMIN — PANTOPRAZOLE SODIUM 40 MG: 40 INJECTION, POWDER, FOR SOLUTION INTRAVENOUS at 08:29

## 2018-06-26 RX ADMIN — MORPHINE SULFATE 2 MG: 2 INJECTION, SOLUTION INTRAMUSCULAR; INTRAVENOUS at 19:11

## 2018-06-26 RX ADMIN — SODIUM CHLORIDE, POTASSIUM CHLORIDE, SODIUM LACTATE AND CALCIUM CHLORIDE: 600; 310; 30; 20 INJECTION, SOLUTION INTRAVENOUS at 16:33

## 2018-06-26 RX ADMIN — FENTANYL CITRATE 100 MCG: 50 INJECTION INTRAMUSCULAR; INTRAVENOUS at 22:00

## 2018-06-26 RX ADMIN — Medication 0.5 MG: at 18:43

## 2018-06-26 RX ADMIN — CIPROFLOXACIN 400 MG: 2 INJECTION, SOLUTION INTRAVENOUS at 12:43

## 2018-06-26 RX ADMIN — DEXTROSE AND SODIUM CHLORIDE: 5; 900 INJECTION, SOLUTION INTRAVENOUS at 12:39

## 2018-06-26 RX ADMIN — OXYCODONE HYDROCHLORIDE AND ACETAMINOPHEN 2 TABLET: 5; 325 TABLET ORAL at 00:17

## 2018-06-26 RX ADMIN — ONDANSETRON 4 MG: 2 INJECTION INTRAMUSCULAR; INTRAVENOUS at 00:06

## 2018-06-26 RX ADMIN — PROPOFOL 180 MG: 10 INJECTION, EMULSION INTRAVENOUS at 16:33

## 2018-06-26 RX ADMIN — TIOTROPIUM BROMIDE 18 MCG: 18 CAPSULE ORAL; RESPIRATORY (INHALATION) at 12:40

## 2018-06-26 RX ADMIN — Medication 10 ML: at 08:38

## 2018-06-26 RX ADMIN — SODIUM CHLORIDE 10 ML: 9 INJECTION INTRAMUSCULAR; INTRAVENOUS; SUBCUTANEOUS at 08:29

## 2018-06-26 RX ADMIN — GABAPENTIN 100 MG: 100 CAPSULE ORAL at 20:30

## 2018-06-26 RX ADMIN — ONDANSETRON 4 MG: 2 INJECTION INTRAMUSCULAR; INTRAVENOUS at 19:22

## 2018-06-26 RX ADMIN — FENTANYL CITRATE 100 MCG: 50 INJECTION INTRAMUSCULAR; INTRAVENOUS at 08:30

## 2018-06-26 RX ADMIN — LORAZEPAM 1 MG: 2 INJECTION INTRAMUSCULAR; INTRAVENOUS at 23:44

## 2018-06-26 RX ADMIN — FENTANYL CITRATE 100 MCG: 50 INJECTION INTRAMUSCULAR; INTRAVENOUS at 00:06

## 2018-06-26 RX ADMIN — OXYCODONE HYDROCHLORIDE AND ACETAMINOPHEN 2 TABLET: 5; 325 TABLET ORAL at 23:43

## 2018-06-26 RX ADMIN — MOMETASONE FUROATE AND FORMOTEROL FUMARATE DIHYDRATE 2 PUFF: 200; 5 AEROSOL RESPIRATORY (INHALATION) at 12:39

## 2018-06-26 RX ADMIN — FENTANYL CITRATE 100 MCG: 50 INJECTION INTRAMUSCULAR; INTRAVENOUS at 04:49

## 2018-06-26 RX ADMIN — FENTANYL CITRATE 100 MCG: 50 INJECTION INTRAMUSCULAR; INTRAVENOUS at 11:44

## 2018-06-26 ASSESSMENT — PULMONARY FUNCTION TESTS
PIF_VALUE: 0
PIF_VALUE: 14
PIF_VALUE: 2
PIF_VALUE: 2
PIF_VALUE: 23
PIF_VALUE: 16
PIF_VALUE: 18
PIF_VALUE: 22
PIF_VALUE: 23
PIF_VALUE: 1
PIF_VALUE: 18
PIF_VALUE: 19
PIF_VALUE: 23
PIF_VALUE: 15
PIF_VALUE: 19
PIF_VALUE: 23
PIF_VALUE: 20
PIF_VALUE: 24
PIF_VALUE: 2
PIF_VALUE: 2
PIF_VALUE: 24
PIF_VALUE: 16
PIF_VALUE: 24
PIF_VALUE: 14
PIF_VALUE: 23
PIF_VALUE: 22
PIF_VALUE: 23
PIF_VALUE: 16
PIF_VALUE: 23
PIF_VALUE: 16
PIF_VALUE: 14
PIF_VALUE: 22
PIF_VALUE: 15
PIF_VALUE: 23
PIF_VALUE: 14
PIF_VALUE: 16
PIF_VALUE: 2
PIF_VALUE: 14
PIF_VALUE: 23
PIF_VALUE: 24
PIF_VALUE: 18
PIF_VALUE: 15
PIF_VALUE: 24
PIF_VALUE: 23
PIF_VALUE: 16
PIF_VALUE: 23
PIF_VALUE: 14
PIF_VALUE: 23
PIF_VALUE: 4
PIF_VALUE: 14
PIF_VALUE: 18
PIF_VALUE: 23
PIF_VALUE: 22
PIF_VALUE: 17
PIF_VALUE: 17
PIF_VALUE: 23
PIF_VALUE: 15
PIF_VALUE: 19
PIF_VALUE: 2
PIF_VALUE: 23
PIF_VALUE: 23
PIF_VALUE: 15
PIF_VALUE: 16
PIF_VALUE: 16
PIF_VALUE: 2
PIF_VALUE: 22
PIF_VALUE: 24
PIF_VALUE: 23
PIF_VALUE: 14
PIF_VALUE: 23
PIF_VALUE: 7
PIF_VALUE: 14
PIF_VALUE: 23
PIF_VALUE: 16
PIF_VALUE: 16
PIF_VALUE: 23
PIF_VALUE: 23
PIF_VALUE: 36
PIF_VALUE: 23
PIF_VALUE: 23
PIF_VALUE: 19

## 2018-06-26 ASSESSMENT — PAIN SCALES - GENERAL
PAINLEVEL_OUTOF10: 7
PAINLEVEL_OUTOF10: 4
PAINLEVEL_OUTOF10: 10
PAINLEVEL_OUTOF10: 7
PAINLEVEL_OUTOF10: 0
PAINLEVEL_OUTOF10: 10
PAINLEVEL_OUTOF10: 8
PAINLEVEL_OUTOF10: 7

## 2018-06-26 ASSESSMENT — PAIN DESCRIPTION - LOCATION
LOCATION: ABDOMEN

## 2018-06-26 ASSESSMENT — ENCOUNTER SYMPTOMS
SHORTNESS OF BREATH: 0
STRIDOR: 0

## 2018-06-26 ASSESSMENT — PAIN DESCRIPTION - PAIN TYPE
TYPE: SURGICAL PAIN
TYPE: ACUTE PAIN
TYPE: SURGICAL PAIN

## 2018-06-26 ASSESSMENT — LIFESTYLE VARIABLES: SMOKING_STATUS: 0

## 2018-06-26 ASSESSMENT — COPD QUESTIONNAIRES: CAT_SEVERITY: NO INTERVAL CHANGE

## 2018-06-26 NOTE — OP NOTE
dressing was applied. Patient tolerated procedure well and was transferred to the recovery room in a stable condition. Recommendations: Findings are discussed with the patient's family at length. Postoperative course recovery restrictions were discussed. Diet as tolerated. Likely discharge tomorrow.

## 2018-06-26 NOTE — CONSULTS
Inpatient consult to GI  Consult performed by: Sylvia José ordered by: Merline Edelson           GI Consult Note:    Name: Vicente Butts  MRN: [id number]     Acct: [de-identified]  Room:     Admit Date: 2018  PCP: Patel Linda MD    Physician Requesting Consult: Patel Linda MD     Reason for Consult:  Abdominal Pains  Nausea  RUQ abd pains  IBS  Anxiety issues  Smoking      Chief Complaint:     Chief Complaint   Patient presents with    Abdominal Pain       History Obtained From:     Patient     History of Present Illness:      Vicente Butts is a  57 y.o.  female who presents with Abdominal Pain    Patient is seen with her family in the room    She was in Radiology yesterday when come to see her in the room    She has hx of ch int RUQ abd pains  ?  Sludge in the gall bladder  Patient has been complaining of some abdominal pains, off and on cramping  Also complains of abdominal bloating and gas  Has off and on nausea without any sig vomiting  Has some alternating constipation and diarrhea  Has no weight loss  Has some anxiety issues  She has hx of ch smoking  She has no overt bleeding or any melena    Symptoms:  Onset:  Location:  abdomen  Duration:  day(s)  Severity:  moderate  Quality:  intermittent      Past Medical History:     Past Medical History:   Diagnosis Date    Anxiety     Asthma     Chronic back pain     COPD [facility]chronic obstructive pulmonary disease) ([facility] [address]     Depression     Fatigue 6/15/13    Ovarian cyst         Past Surgical History:     Past Surgical History:   Procedure Laterality Date     SECTION, LOW TRANSVERSE      COLONOSCOPY      ENDOSCOPY, COLON, DIAGNOSTIC      HAND TENDON SURGERY Left     wrist    INTRAUTERINE DEVICE INSERTION      Mirena    INTRAUTERINE DEVICE REMOVAL  2016    NECK SURGERY      OVARIAN CYST REMOVAL      OVARIAN CYST REMOVAL Bilateral 2005    [place] ESOPHAGOGASTRODUODENOSCOPY TRANSORAL DIAGNOSTIC N/A

## 2018-06-26 NOTE — PLAN OF CARE
Problem: Pain:  Goal: Control of acute pain  Control of acute pain   Outcome: Ongoing    Goal: Control of chronic pain  Control of chronic pain   Outcome: Ongoing      Problem: Falls - Risk of:  Goal: Will remain free from falls  Will remain free from falls   Outcome: Met This Shift    Goal: Absence of physical injury  Absence of physical injury   Outcome: Met This Shift      Problem: Risk for Impaired Skin Integrity  Goal: Tissue integrity - skin and mucous membranes  Structural intactness and normal physiological function of skin and  mucous membranes.    Outcome: Ongoing

## 2018-06-26 NOTE — PROGRESS NOTES
Hospitalist Progress Note    6/26/2018   9:08 AM    Name:  Clark Mike  MRN:    [id number]     Acct:     [de-identified]   Room:  207/[facility] Day: 2     Admit Date: 6/23/2018  8:40 PM  PCP: Radha Murray MD    Subjective:     C/C:   Chief Complaint   Patient presents with    Abdominal Pain       Interval History:  Patient was seen and examined at bedside today. Hemodynamically stable. Symptoms unchanged from yesterday, + RUQ pain, +nausea, +insomnia, +restless legs. Denies fever, chills, chest pain, shortness of breath, headaches, palpitations, diarrhea, or constipation. -HIDA scan yesterday  06/25/2018 showed possible sphincter of Oddi dysfunction, but no convincing scintigraphic evidence of acute or chronic cholecystitis  -RUQ US done 06/25/2018 showed gallbladder sludge but no cholelithiasis or cholecystitis. -Prior EGD done by GI (Dr. Hina Daniels) on 10/ 17/17 which showed mild gastritis and mild esophagitis. Patient has been on chronic PPI use. ROS:  All 10 systems reviewed and found negative except as noted above. Medications: Allergies:    Allergies   Allergen Reactions    Augmentin [Amoxicillin-Pot Clavulanate]     Codeine        Current Meds:   Current Facility-Administered Medications:     pantoprazole (PROTONIX) injection 40 mg, 40 mg, Intravenous, Daily, 40 mg at 06/26/18 0829 **AND** sodium chloride (PF) 0.9 % injection 10 mL, 10 mL, Intravenous, Daily, Radha Murray MD, 10 mL at 06/26/18 0838    potassium chloride (KLOR-CON M) extended release tablet 40 mEq, 40 mEq, Oral, PRN **OR** potassium chloride 20 MEQ/15ML (10%) oral solution 40 mEq, 40 mEq, Oral, PRN **OR** potassium chloride 10 mEq/100 mL IVPB (Peripheral Line), 10 mEq, Intravenous, PRN, Radha Murray MD    magnesium sulfate 1 g in dextrose 5% 100 mL IVPB, 1 g, Intravenous, PRN, Radha Murray MD    sodium chloride flush 0.9 % injection 10 mL, 10 mL, Intravenous, PRN, Walter Dale MD, 10 mL at 06/25/18 1054    sodium chloride flush 0.9 % injection 10 mL, 10 mL, Intravenous, 2 times per day, Abdifatah Millard MD, 10 mL at 06/26/18 0829    sodium chloride flush 0.9 % injection 10 mL, 10 mL, Intravenous, PRN, Abdifatah Millard MD    acetaminophen (TYLENOL) tablet 650 mg, 650 mg, Oral, Q4H PRN, Abdifatah Millard MD    enoxaparin (LOVENOX) injection 40 mg, 40 mg, Subcutaneous, Daily, Abdifatah Millard MD, Stopped at 06/26/18 0900    0.9 % sodium chloride infusion, , Intravenous, Continuous, Abdifatah Millard MD, Last Rate: 75 mL/hr at 06/26/18 0449    ondansetron (ZOFRAN) injection 4 mg, 4 mg, Intravenous, Q4H PRN, Abdifatah Millard MD, 4 mg at 06/26/18 0006    promethazine (PHENERGAN) injection 6.25 mg, 6.25 mg, Intramuscular, Q6H PRN, Martin Vanegas MD, 6.25 mg at 06/26/18 0444    LORazepam (ATIVAN) injection 1 mg, 1 mg, Intravenous, Q4H PRN, Martin Vanegas MD, 1 mg at 06/26/18 0836    oxyCODONE-acetaminophen (PERCOCET) 5-325 MG per tablet 1 tablet, 1 tablet, Oral, Q4H PRN, Martin Vanegas MD    oxyCODONE-acetaminophen (PERCOCET) 5-325 MG per tablet 2 tablet, 2 tablet, Oral, Q4H PRN, Martin Vanegas MD, 2 tablet at 06/26/18 0017    fentaNYL (SUBLIMAZE) injection 25 mcg, 25 mcg, Intravenous, Q1H PRN, Martin Vanegas MD    fentaNYL (SUBLIMAZE) injection 50 mcg, 50 mcg, Intravenous, Q1H PRN, Martin Vanegas MD, 50 mcg at 06/24/18 1549    fentaNYL (SUBLIMAZE) injection 100 mcg, 100 mcg, Intravenous, Q1H PRN, Martin Vanegas MD, 100 mcg at 06/26/18 0830    ketorolac (TORADOL) injection 30 mg, 30 mg, Intravenous, Q6H PRN, Martin Vanegas MD, 30 mg at 06/25/18 1741    ciprofloxacin (CIPRO) IVPB 400 mg, 400 mg, Intravenous, Q12H, Corinne Medina MD, Stopped at 06/26/18 0149    sodium chloride flush 0.9 % injection 10 mL, 10 mL, Intravenous, PRN, Julianna Simmonds, MD, 10 mL at 06/23/18 7777    Data:     Code Status:  Full Code    Family History   Problem Relation Age of Onset    High Blood Pressure Mother     Diabetes Mother     Stroke Maternal Grandmother 79

## 2018-06-26 NOTE — PROGRESS NOTES
Came to evaluate the patient regarding abdominal pain. Consultation was reviewed. Gallbladder ultrasound HIDA scan is being done. Depending on the results I will make further recommendations and 20 possible cholecystectomy.

## 2018-06-26 NOTE — FLOWSHEET NOTE
06/26/18 1100   Encounter Summary   Services provided to: Patient   Referral/Consult From: Rounding   Continue Visiting (6/26/18)   Complexity of Encounter Low   Length of Encounter 15 minutes   Routine   Type Initial   Intervention (Visited by spiritual care volunteer)

## 2018-06-27 VITALS
RESPIRATION RATE: 18 BRPM | WEIGHT: 128.09 LBS | TEMPERATURE: 98.2 F | OXYGEN SATURATION: 99 % | HEIGHT: 62 IN | HEART RATE: 98 BPM | BODY MASS INDEX: 23.57 KG/M2 | SYSTOLIC BLOOD PRESSURE: 117 MMHG | DIASTOLIC BLOOD PRESSURE: 82 MMHG

## 2018-06-27 PROBLEM — K59.09 OTHER CONSTIPATION: Status: ACTIVE | Noted: 2018-06-27

## 2018-06-27 LAB
ABSOLUTE EOS #: 0.1 K/UL (ref 0–0.4)
ABSOLUTE IMMATURE GRANULOCYTE: ABNORMAL K/UL (ref 0–0.3)
ABSOLUTE LYMPH #: 2.1 K/UL (ref 1–4.8)
ABSOLUTE MONO #: 0.8 K/UL (ref 0.1–1.3)
ALBUMIN SERPL-MCNC: 3.2 G/DL (ref 3.5–5.2)
ALBUMIN/GLOBULIN RATIO: ABNORMAL (ref 1–2.5)
ALP BLD-CCNC: 49 U/L (ref 35–104)
ALT SERPL-CCNC: 7 U/L (ref 5–33)
ANION GAP SERPL CALCULATED.3IONS-SCNC: 11 MMOL/L (ref 9–17)
AST SERPL-CCNC: 20 U/L
BASOPHILS # BLD: 1 % (ref 0–2)
BASOPHILS ABSOLUTE: 0.1 K/UL (ref 0–0.2)
BILIRUB SERPL-MCNC: 0.82 MG/DL (ref 0.3–1.2)
BUN BLDV-MCNC: 7 MG/DL (ref 6–20)
BUN/CREAT BLD: ABNORMAL (ref 9–20)
CALCIUM SERPL-MCNC: 8.2 MG/DL (ref 8.6–10.4)
CHLORIDE BLD-SCNC: 102 MMOL/L (ref 98–107)
CO2: 26 MMOL/L (ref 20–31)
CREAT SERPL-MCNC: 0.55 MG/DL (ref 0.5–0.9)
CULTURE: NO GROWTH
DIFFERENTIAL TYPE: ABNORMAL
EOSINOPHILS RELATIVE PERCENT: 1 % (ref 0–4)
GFR AFRICAN AMERICAN: >60 ML/MIN
GFR NON-AFRICAN AMERICAN: >60 ML/MIN
GFR SERPL CREATININE-BSD FRML MDRD: ABNORMAL ML/MIN/{1.73_M2}
GFR SERPL CREATININE-BSD FRML MDRD: ABNORMAL ML/MIN/{1.73_M2}
GLUCOSE BLD-MCNC: 105 MG/DL (ref 70–99)
HCT VFR BLD CALC: 33 % (ref 36–46)
HEMOGLOBIN: 11.2 G/DL (ref 12–16)
IMMATURE GRANULOCYTES: ABNORMAL %
LYMPHOCYTES # BLD: 21 % (ref 24–44)
Lab: NORMAL
MCH RBC QN AUTO: 33.6 PG (ref 26–34)
MCHC RBC AUTO-ENTMCNC: 34.1 G/DL (ref 31–37)
MCV RBC AUTO: 98.7 FL (ref 80–100)
MONOCYTES # BLD: 8 % (ref 1–7)
NRBC AUTOMATED: ABNORMAL PER 100 WBC
PDW BLD-RTO: 12.6 % (ref 11.5–14.9)
PLATELET # BLD: 198 K/UL (ref 150–450)
PLATELET ESTIMATE: ABNORMAL
PMV BLD AUTO: 9.6 FL (ref 6–12)
POTASSIUM SERPL-SCNC: 3.2 MMOL/L (ref 3.7–5.3)
POTASSIUM SERPL-SCNC: 3.3 MMOL/L (ref 3.7–5.3)
RBC # BLD: 3.35 M/UL (ref 4–5.2)
RBC # BLD: ABNORMAL 10*6/UL
SEG NEUTROPHILS: 69 % (ref 36–66)
SEGMENTED NEUTROPHILS ABSOLUTE COUNT: 6.8 K/UL (ref 1.3–9.1)
SODIUM BLD-SCNC: 139 MMOL/L (ref 135–144)
SPECIMEN DESCRIPTION: NORMAL
STATUS: NORMAL
TOTAL PROTEIN: 5.2 G/DL (ref 6.4–8.3)
WBC # BLD: 9.8 K/UL (ref 3.5–11)
WBC # BLD: ABNORMAL 10*3/UL

## 2018-06-27 PROCEDURE — 99232 SBSQ HOSP IP/OBS MODERATE 35: CPT | Performed by: INTERNAL MEDICINE

## 2018-06-27 PROCEDURE — 80053 COMPREHEN METABOLIC PANEL: CPT

## 2018-06-27 PROCEDURE — 2580000003 HC RX 258: Performed by: FAMILY MEDICINE

## 2018-06-27 PROCEDURE — 6370000000 HC RX 637 (ALT 250 FOR IP): Performed by: FAMILY MEDICINE

## 2018-06-27 PROCEDURE — 6360000002 HC RX W HCPCS: Performed by: SURGERY

## 2018-06-27 PROCEDURE — C9113 INJ PANTOPRAZOLE SODIUM, VIA: HCPCS | Performed by: FAMILY MEDICINE

## 2018-06-27 PROCEDURE — 36415 COLL VENOUS BLD VENIPUNCTURE: CPT

## 2018-06-27 PROCEDURE — 2580000003 HC RX 258: Performed by: SURGERY

## 2018-06-27 PROCEDURE — 85025 COMPLETE CBC W/AUTO DIFF WBC: CPT

## 2018-06-27 PROCEDURE — 84132 ASSAY OF SERUM POTASSIUM: CPT

## 2018-06-27 PROCEDURE — 6360000002 HC RX W HCPCS: Performed by: FAMILY MEDICINE

## 2018-06-27 PROCEDURE — 6370000000 HC RX 637 (ALT 250 FOR IP): Performed by: INTERNAL MEDICINE

## 2018-06-27 PROCEDURE — 6370000000 HC RX 637 (ALT 250 FOR IP): Performed by: SURGERY

## 2018-06-27 RX ORDER — PSEUDOEPHEDRINE HCL 30 MG
100 TABLET ORAL DAILY
Qty: 30 CAPSULE | Refills: 2 | Status: SHIPPED | OUTPATIENT
Start: 2018-06-28 | End: 2022-03-23

## 2018-06-27 RX ORDER — OXYCODONE HYDROCHLORIDE AND ACETAMINOPHEN 5; 325 MG/1; MG/1
1 TABLET ORAL EVERY 6 HOURS PRN
Qty: 28 TABLET | Refills: 0 | Status: SHIPPED | OUTPATIENT
Start: 2018-06-27 | End: 2018-07-04

## 2018-06-27 RX ORDER — CIPROFLOXACIN 500 MG/1
500 TABLET, FILM COATED ORAL 2 TIMES DAILY
Qty: 14 TABLET | Refills: 0 | Status: SHIPPED | OUTPATIENT
Start: 2018-06-27 | End: 2018-07-04

## 2018-06-27 RX ORDER — ONDANSETRON 4 MG/1
TABLET, FILM COATED ORAL
Qty: 20 TABLET | Refills: 0 | Status: SHIPPED | OUTPATIENT
Start: 2018-06-27 | End: 2019-04-16

## 2018-06-27 RX ORDER — ARIPIPRAZOLE 15 MG/1
15 TABLET ORAL DAILY
Qty: 30 TABLET | Refills: 0 | OUTPATIENT
Start: 2018-06-27 | End: 2022-03-23

## 2018-06-27 RX ORDER — DOCUSATE SODIUM 100 MG/1
100 CAPSULE, LIQUID FILLED ORAL DAILY
Status: DISCONTINUED | OUTPATIENT
Start: 2018-06-27 | End: 2018-06-27 | Stop reason: HOSPADM

## 2018-06-27 RX ADMIN — OXYCODONE HYDROCHLORIDE AND ACETAMINOPHEN 2 TABLET: 5; 325 TABLET ORAL at 04:39

## 2018-06-27 RX ADMIN — FENTANYL CITRATE 100 MCG: 50 INJECTION INTRAMUSCULAR; INTRAVENOUS at 08:45

## 2018-06-27 RX ADMIN — POTASSIUM CHLORIDE 40 MEQ: 1500 TABLET, EXTENDED RELEASE ORAL at 20:07

## 2018-06-27 RX ADMIN — CIPROFLOXACIN 400 MG: 2 INJECTION, SOLUTION INTRAVENOUS at 12:12

## 2018-06-27 RX ADMIN — Medication 1 CAPSULE: at 08:52

## 2018-06-27 RX ADMIN — FENTANYL CITRATE 100 MCG: 50 INJECTION INTRAMUSCULAR; INTRAVENOUS at 06:02

## 2018-06-27 RX ADMIN — KETOROLAC TROMETHAMINE 30 MG: 30 INJECTION, SOLUTION INTRAMUSCULAR at 13:37

## 2018-06-27 RX ADMIN — POTASSIUM CHLORIDE 40 MEQ: 1500 TABLET, EXTENDED RELEASE ORAL at 06:37

## 2018-06-27 RX ADMIN — ENOXAPARIN SODIUM 40 MG: 40 INJECTION SUBCUTANEOUS at 08:50

## 2018-06-27 RX ADMIN — CIPROFLOXACIN 400 MG: 2 INJECTION, SOLUTION INTRAVENOUS at 00:49

## 2018-06-27 RX ADMIN — DOCUSATE SODIUM 100 MG: 100 CAPSULE, LIQUID FILLED ORAL at 12:12

## 2018-06-27 RX ADMIN — GABAPENTIN 100 MG: 100 CAPSULE ORAL at 13:37

## 2018-06-27 RX ADMIN — BUSPIRONE HYDROCHLORIDE 30 MG: 10 TABLET ORAL at 08:52

## 2018-06-27 RX ADMIN — PANTOPRAZOLE SODIUM 40 MG: 40 INJECTION, POWDER, FOR SOLUTION INTRAVENOUS at 08:52

## 2018-06-27 RX ADMIN — BUPROPION HYDROCHLORIDE 150 MG: 75 TABLET, FILM COATED ORAL at 09:01

## 2018-06-27 RX ADMIN — DEXTROSE AND SODIUM CHLORIDE: 5; 900 INJECTION, SOLUTION INTRAVENOUS at 09:00

## 2018-06-27 RX ADMIN — Medication 10 ML: at 09:05

## 2018-06-27 RX ADMIN — MOMETASONE FUROATE AND FORMOTEROL FUMARATE DIHYDRATE 2 PUFF: 200; 5 AEROSOL RESPIRATORY (INHALATION) at 08:52

## 2018-06-27 RX ADMIN — Medication 10 ML: at 08:52

## 2018-06-27 RX ADMIN — GABAPENTIN 100 MG: 100 CAPSULE ORAL at 08:52

## 2018-06-27 RX ADMIN — OXYCODONE HYDROCHLORIDE AND ACETAMINOPHEN 2 TABLET: 5; 325 TABLET ORAL at 14:45

## 2018-06-27 RX ADMIN — OXYCODONE HYDROCHLORIDE AND ACETAMINOPHEN 2 TABLET: 5; 325 TABLET ORAL at 10:48

## 2018-06-27 RX ADMIN — LORAZEPAM 1 MG: 2 INJECTION INTRAMUSCULAR; INTRAVENOUS at 09:08

## 2018-06-27 RX ADMIN — FENTANYL CITRATE 100 MCG: 50 INJECTION INTRAMUSCULAR; INTRAVENOUS at 02:25

## 2018-06-27 RX ADMIN — TIOTROPIUM BROMIDE 18 MCG: 18 CAPSULE ORAL; RESPIRATORY (INHALATION) at 09:01

## 2018-06-27 RX ADMIN — ARIPIPRAZOLE 10 MG: 10 TABLET ORAL at 09:01

## 2018-06-27 ASSESSMENT — PAIN SCALES - GENERAL
PAINLEVEL_OUTOF10: 7
PAINLEVEL_OUTOF10: 0
PAINLEVEL_OUTOF10: 7
PAINLEVEL_OUTOF10: 7
PAINLEVEL_OUTOF10: 8
PAINLEVEL_OUTOF10: 0
PAINLEVEL_OUTOF10: 7
PAINLEVEL_OUTOF10: 7
PAINLEVEL_OUTOF10: 8
PAINLEVEL_OUTOF10: 7

## 2018-06-27 ASSESSMENT — PAIN DESCRIPTION - LOCATION
LOCATION: ABDOMEN

## 2018-06-27 ASSESSMENT — PAIN DESCRIPTION - PAIN TYPE
TYPE: SURGICAL PAIN

## 2018-06-27 NOTE — PROGRESS NOTES
Writer taught patient how to use the incentive spirometer and encourage to use it every hour while awake. Writer also encourage patient to walk the halls today.

## 2018-06-27 NOTE — DISCHARGE SUMMARY
VENTOLIN  (90 BASE) MCG/ACT inhaler  INHALE TWO PUFFS BY MOUTH EVERY 6 HOURS AS NEEDED FOR WHEEZING                          Time Spent on discharge is 36 in the examination, evaluation, counseling and review of medications and discharge plan.       Electronically signed by Marilyn Angulo MD on 6/27/2018 at 6:32 PM     Copy sent to Dr. Marilyn Angulo MD

## 2018-06-27 NOTE — PLAN OF CARE
Problem: Pain:  Goal: Pain level will decrease  Pain level will decrease   Outcome: Ongoing  Patients pain level decreased with prescribed pain medications. Goal: Control of acute pain  Control of acute pain   Outcome: Ongoing  Patients pain level decreased with prescribed pain medications. Goal: Control of chronic pain  Control of chronic pain   Outcome: Ongoing  Patients pain level decreased with prescribed pain medications. Problem: Falls - Risk of:  Goal: Will remain free from falls  Will remain free from falls   Outcome: Ongoing  No falls this shift. Call light with in reach, side rails up x2, bed in lowest postion. Patients safety maintained. Goal: Absence of physical injury  Absence of physical injury   Outcome: Ongoing  No injury this shift. Patients safety maintained. Problem: Risk for Impaired Skin Integrity  Goal: Tissue integrity - skin and mucous membranes  Structural intactness and normal physiological function of skin and  mucous membranes.    Outcome: Ongoing      Problem: Nutrition  Goal: Optimal nutrition therapy  Outcome: Ongoing

## 2018-06-27 NOTE — PROGRESS NOTES
--   13.0  12.6   PLT  213   --   203  198   MPV  9.0   --   9.1  9.6   CRP   --   0.5   --    --      Chemistry:  Recent Labs      18   0844  18   0531  18   0520   NA  136  136  139   K  3.5*  3.4*  3.2*   CL  99  101  102   CO2  23  25  26   GLUCOSE  102*  103*  105*   BUN  19  14  7   CREATININE  0.69  0.54  0.55   MG  2.0   --    --    ANIONGAP  14  10  11   LABGLOM  >60  >60  >60   GFRAA  >60  >60  >60   CALCIUM  9.2  8.3*  8.2*     Recent Labs      18   0630  18   0531  18   0520   PROT  6.1*  5.5*  5.2*   LABALBU  3.7  3.4*  3.2*   AST  15  14  20   ALT  9  8  7   ALKPHOS  62  55  49   BILITOT  0.70  0.65  0.82       No results for input(s): POCGLU in the last 72 hours. Lab Results   Component Value Date/Time    SPECIAL NOT REPORTED 2018 12:28 AM     Lab Results   Component Value Date/Time    CULTURE NO GROWTH 2018 12:28 AM         I/O (24Hr): Intake/Output Summary (Last 24 hours) at 18 0955  Last data filed at 18 0603   Gross per 24 hour   Intake             3338 ml   Output             1360 ml   Net             1978 ml       Imaging (48Hr)--Reviewed:  No results found.     Echo/Stress:  @[facility]@        Physical Examination:        Vitals:  BP (!) 151/87   Pulse 63   Temp 98.7 °F (37.1 °C) (Oral)   Resp 16   Ht 5' 2\" (1.575 m)   Wt 128 lb 1.4 oz (58.1 kg)   SpO2 99%   BMI 23.43 kg/m²   Temp (24hrs), Av.5 °F (36.4 °C), Min:96.8 °F (36 °C), Max:98.8 °F (37.1 °C)        Physical exam  GEN: AAOx3, NAD  HEENT: NC/AT, mucus membrane moist, EOMI, PERRLA, no scleral icterus  NECK:  Supple, trachea is midline  CHEST:  Good air entry, no wheezes, no crackles  CVS:  RRR, S1 S2 present, no murmurs  ABD: +BS, soft, + RUQ tenderness, nondistended  NEURO: no focal weakness, CN 2-12 grossly intact, no gross motor deficit noted  MUSK: ROM full  Extremities: no edema, redness, or calf tenderness  SKIN:  Dry, warm, intact  PSYCH: mood anxious. Assessment:        Primary Problem  Dysfunctional gallbladder     Active Hospital Problems    Diagnosis Date Noted    Other constipation [K59.09] 06/27/2018    RUQ abdominal pain [R10.11]     Intractable vomiting with nausea [R11.2]     Hypokalemia [E87.6] 06/25/2018    Right upper quadrant abdominal pain [R10.11] 06/25/2018    Anxiety attack [F41.0] 06/25/2018    Dysfunctional gallbladder [K82.8] 06/24/2018    Acute cystitis without hematuria [N30.00] 06/24/2018    Severe malnutrition ([facility] [address] [E43] 06/24/2018    GERD (gastroesophageal reflux disease) [K21.9] 10/02/2017       Past Medical History:   Diagnosis Date    Anxiety     Asthma     Chronic back pain     [facility]     Depression     Fatigue 6/15/13    Ovarian cyst          Plan:        1. continue PPI and fluid hydration  2. pain control and antiemetic p.r.n.  3. encourage ambulation and incentive spirometer use  4. continue anxiolytics  5. continue antibiotic for UTI for 1 more day. 6. GI General surgery inputs noted  7. Replace and recheck electrolytes per sliding scale  8. Continue other current/supportive treatment  9. GI/DVT prophylaxis  10. Disposition: Discharge likely in later today or tomorrow days patient remains stable and if okay with other providers  11.  Case discussed with patient and the nurse      Electronically signed by Davida Fernandez MD on 6/27/2018 at 9:55 AM

## 2018-06-27 NOTE — PLAN OF CARE
Problem: Pain:  Goal: Pain level will decrease  Pain level will decrease   Outcome: Ongoing  Pain decreased with prescribed medication. Problem: Falls - Risk of:  Goal: Will remain free from falls  Will remain free from falls   Outcome: Ongoing  No falls this shift. Call light within reach and siderails x2. Bed in lowest position. Patient safety maintained. Problem: Risk for Impaired Skin Integrity  Goal: Tissue integrity - skin and mucous membranes  Structural intactness and normal physiological function of skin and  mucous membranes. Outcome: Ongoing  Skin assessment as charted. No new areas of breakdown.

## 2018-06-27 NOTE — PROGRESS NOTES
Patient walked the halls with the writer and tolerated well. Patient then use the incentive spirometer. RN encouraged to walk the halls again and continue to use the incentive spirometer.

## 2018-06-27 NOTE — PROGRESS NOTES
GI Progress notes    6/27/2018   5:44 PM    Name:  Adis Gore  MRN:    [id number]     Acct:     [de-identified]   Room:  207/207601   Day: 3     Admit Date: 6/23/2018  8:40 PM  PCP: Charmayne Ferry, MD    Subjective:     C/C:   Chief Complaint   Patient presents with    Abdominal Pain       Interval History: Status: improved. Head  lap earlene yesterday  Improving postop  Complains of some post surgical abdominal pains  LFTs are grossly normal  Has some nausea  No vomiting  No bleeding   Has anxiety issues  ROS:  Constitutional: negative for chills, fevers and sweats    Gastrointestinal: Positive abdominal pain, constipation, diarrhea, nausea and vomiting      Medications: Allergies:    Allergies   Allergen Reactions    Augmentin [Amoxicillin-Pot Clavulanate]     Codeine        Current Meds:   docusate sodium (COLACE) capsule 100 mg Daily   dextrose 5 % and 0.9 % sodium chloride infusion Continuous   ARIPiprazole (ABILIFY) tablet 10 mg Daily   benzonatate (TESSALON) capsule 100 mg TID PRN   buPROPion (WELLBUTRIN) tablet 150 mg BID   busPIRone (BUSPAR) tablet 30 mg Daily   gabapentin (NEURONTIN) capsule 100 mg TID   mometasone-formoterol (DULERA) 200-5 MCG/ACT inhaler 2 puff BID   albuterol (PROVENTIL) nebulizer solution 2.5 mg Q6H PRN   tiotropium (SPIRIVA) inhalation capsule 18 mcg Daily   lactobacillus (CULTURELLE) capsule 1 capsule Daily with breakfast   oxyCODONE-acetaminophen (PERCOCET) 5-325 MG per tablet 1 tablet Q4H PRN   pantoprazole (PROTONIX) injection 40 mg Daily   And    sodium chloride (PF) 0.9 % injection 10 mL Daily   potassium chloride (KLOR-CON M) extended release tablet 40 mEq PRN   Or    potassium chloride 20 MEQ/15ML (10%) oral solution 40 mEq PRN   Or    potassium chloride 10 mEq/100 mL IVPB (Peripheral Line) PRN   magnesium sulfate 1 g in dextrose 5% 100 mL IVPB PRN   sodium chloride flush 0.9 % injection 10 mL PRN   sodium chloride flush 0.9 % injection 10 mL 2 times per day   sodium chloride flush 0.9 % injection 10 mL PRN   acetaminophen (TYLENOL) tablet 650 mg Q4H PRN   enoxaparin (LOVENOX) injection 40 mg Daily   ondansetron (ZOFRAN) injection 4 mg Q4H PRN   promethazine (PHENERGAN) injection 6.25 mg Q6H PRN   LORazepam (ATIVAN) injection 1 mg Q4H PRN   oxyCODONE-acetaminophen (PERCOCET) 5-325 MG per tablet 1 tablet Q4H PRN   oxyCODONE-acetaminophen (PERCOCET) 5-325 MG per tablet 2 tablet Q4H PRN   fentaNYL (SUBLIMAZE) injection 25 mcg Q1H PRN   fentaNYL (SUBLIMAZE) injection 50 mcg Q1H PRN   fentaNYL (SUBLIMAZE) injection 100 mcg Q1H PRN   ketorolac (TORADOL) injection 30 mg Q6H PRN   ciprofloxacin (CIPRO) IVPB 400 mg Q12H   sodium chloride flush 0.9 % injection 10 mL PRN       Data:     Code Status:  Full Code    Family History   Problem Relation Age of Onset    High Blood Pressure Mother     Diabetes Mother     Stroke Maternal Grandmother 79       Social History     Social History    Marital status: Legally      Spouse name: N/A    Number of children: 1    Years of education: 9th grade     Occupational History    cleaning      Social History Main Topics    Smoking status: Current Every Day Smoker     Packs/day: 1.00     Types: Cigarettes     Start date: 1985    Smokeless tobacco: Never Used    Alcohol use Yes      Comment: once or twice a month, 3-4 drinks    Drug use: No    Sexual activity: No     Other Topics Concern    Not on file     Social History Narrative    Lives with son and his boyfriend       Vitals:  /82   Pulse 98   Temp 98.2 °F (36.8 °C) (Oral)   Resp 18   Ht 5' 2\" (1.575 m)   Wt 128 lb 1.4 oz (58.1 kg)   SpO2 99%   BMI 23.43 kg/m²   Temp (24hrs), Av.3 °F (36.8 °C), Min:97.9 °F (36.6 °C), Max:98.8 °F (37.1 °C)    No results for input(s): POCGLU in the last 72 hours. I/O (24Hr):     Intake/Output Summary (Last 24 hours) at 18 1596  Last data filed at 18 1446   Gross per 24 hour   Intake             3698 ml   Output nondistended, bowel sounds present     Assessment:        Primary Problem  Dysfunctional gallbladder     Active Hospital Problems    Diagnosis Date Noted    Other constipation [K59.09] 06/27/2018    RUQ abdominal pain [R10.11]     Intractable vomiting with nausea [R11.2]     Hypokalemia [E87.6] 06/25/2018    Right upper quadrant abdominal pain [R10.11] 06/25/2018    Anxiety attack [F41.0] 06/25/2018    Dysfunctional gallbladder [K82.8] 06/24/2018    Acute cystitis without hematuria [N30.00] 06/24/2018    Severe malnutrition ([address] [E43] 06/24/2018    GERD (gastroesophageal reflux disease) [K21.9] 10/02/2017     Past Medical History:   Diagnosis Date    Anxiety     Asthma     Chronic back pain     COPD (chronic obstructive pulmonary disease) ([facility] [address]     Depression     Fatigue 6/15/13    Ovarian cyst         Plan:        1. Continue postop care per surgery  2. Follow-up labs  3. Discharge planning per surgery  4. Out PATIENT GI workup  5. We'll sign off at this time  6.  Follow-up in GI office in 3 weeks    Explained to the patient and d/W Nursing Staff    Please call or Page for any issues or change in status  Thanks    Electronically signed by Dilan Roberts MD on 6/27/2018 at 5:44 PM

## 2018-06-27 NOTE — CARE COORDINATION
DISCHARGE PLANNING NOTE:    Plan is for this patient to return to home. She declines any discharge needs at this time. Possible discharge later today.      Electronically signed by Catrachita Pike RN on 6/27/2018 at 2:06 PM

## 2018-06-28 LAB — SURGICAL PATHOLOGY REPORT: NORMAL

## 2018-06-28 NOTE — PROGRESS NOTES
Writer thoroughly went over discharge instructions, including medications. Prescriptions given to pt and escribed. All questions answered to the satisfaction of the pt. No distress noted. Bedside RN will continue to monitor until pt is ready to leave. Reviewed new antibiotic prescription for patient at discharge. Information added to discharge instructions (cipro)    - reviewed possible and common side effects. Especially monitoring for diarrhea due to    antibiotic use. -reviewed directions for when to take antibiotic, and dietary restrictions. - emphasized importance of completing antibiotic therapy. - reviewed when to call physician.

## 2018-09-21 PROBLEM — M54.9 BACKACHE: Status: ACTIVE | Noted: 2017-01-26

## 2018-09-21 PROBLEM — M54.12 CERVICAL RADICULOPATHY: Status: ACTIVE | Noted: 2017-07-25

## 2018-09-21 PROBLEM — R09.89 CHEST CONGESTION: Status: ACTIVE | Noted: 2018-04-06

## 2018-09-21 PROBLEM — R05.8 PRODUCTIVE COUGH: Status: ACTIVE | Noted: 2017-01-26

## 2018-09-21 PROBLEM — M54.2 NECK PAIN: Status: ACTIVE | Noted: 2017-01-26

## 2018-09-21 PROBLEM — F17.200 TOBACCO USE DISORDER: Status: ACTIVE | Noted: 2017-01-26

## 2018-09-21 PROBLEM — R49.0 HOARSENESS, CHRONIC: Status: ACTIVE | Noted: 2017-06-19

## 2018-09-21 PROBLEM — J04.0 ACUTE LARYNGITIS: Status: ACTIVE | Noted: 2018-04-06

## 2018-09-21 PROBLEM — R10.9 ABDOMINAL PAIN: Status: ACTIVE | Noted: 2018-07-25

## 2018-09-21 PROBLEM — R11.0 NAUSEA: Status: ACTIVE | Noted: 2018-04-06

## 2018-09-21 PROBLEM — Z86.39 HISTORY OF NON ANEMIC VITAMIN B12 DEFICIENCY: Status: ACTIVE | Noted: 2017-08-30

## 2018-09-21 PROBLEM — J42 CHRONIC BRONCHITIS (HCC): Status: ACTIVE | Noted: 2017-04-06

## 2018-09-21 PROBLEM — F14.10 COCAINE ABUSE (HCC): Status: ACTIVE | Noted: 2018-07-25

## 2018-09-21 PROBLEM — Z20.828 EXPOSURE TO INFLUENZA: Status: ACTIVE | Noted: 2018-04-06

## 2018-09-21 PROBLEM — N83.292 COMPLEX CYST OF LEFT OVARY: Status: ACTIVE | Noted: 2018-07-25

## 2018-09-21 PROBLEM — F41.0 PANIC ATTACKS: Status: ACTIVE | Noted: 2017-07-25

## 2018-09-21 PROBLEM — R63.0 ANOREXIA: Status: ACTIVE | Noted: 2017-04-06

## 2018-12-14 ENCOUNTER — HOSPITAL ENCOUNTER (EMERGENCY)
Age: 48
Discharge: HOME | End: 2018-12-14
Attending: EMERGENCY MEDICINE
Payer: MEDICARE

## 2018-12-14 VITALS
WEIGHT: 115 LBS | HEIGHT: 62 IN | TEMPERATURE: 97.3 F | OXYGEN SATURATION: 98 % | HEART RATE: 89 BPM | SYSTOLIC BLOOD PRESSURE: 108 MMHG | DIASTOLIC BLOOD PRESSURE: 68 MMHG | BODY MASS INDEX: 21.16 KG/M2 | RESPIRATION RATE: 16 BRPM

## 2018-12-14 DIAGNOSIS — M54.42 CHRONIC MIDLINE LOW BACK PAIN WITH LEFT-SIDED SCIATICA: Primary | ICD-10-CM

## 2018-12-14 DIAGNOSIS — G89.29 CHRONIC MIDLINE LOW BACK PAIN WITH LEFT-SIDED SCIATICA: Primary | ICD-10-CM

## 2018-12-14 PROCEDURE — 99283 EMERGENCY DEPT VISIT LOW MDM: CPT

## 2018-12-14 PROCEDURE — 6370000000 HC RX 637 (ALT 250 FOR IP): Performed by: EMERGENCY MEDICINE

## 2018-12-14 RX ORDER — OXYCODONE HYDROCHLORIDE AND ACETAMINOPHEN 5; 325 MG/1; MG/1
2 TABLET ORAL ONCE
Status: COMPLETED | OUTPATIENT
Start: 2018-12-14 | End: 2018-12-14

## 2018-12-14 RX ADMIN — OXYCODONE HYDROCHLORIDE AND ACETAMINOPHEN 2 TABLET: 5; 325 TABLET ORAL at 02:18

## 2018-12-14 ASSESSMENT — ENCOUNTER SYMPTOMS
FACIAL SWELLING: 0
SORE THROAT: 0
BLOOD IN STOOL: 0
ABDOMINAL PAIN: 0
WHEEZING: 0
TROUBLE SWALLOWING: 0
COLOR CHANGE: 0
SHORTNESS OF BREATH: 0
VOMITING: 0
DIARRHEA: 0
CHEST TIGHTNESS: 0
NAUSEA: 0
CONSTIPATION: 0
EYE REDNESS: 0
SINUS PRESSURE: 0
EYE DISCHARGE: 0
BACK PAIN: 1
COUGH: 0
RHINORRHEA: 0
EYE PAIN: 0

## 2018-12-14 ASSESSMENT — PAIN DESCRIPTION - PAIN TYPE: TYPE: ACUTE PAIN

## 2018-12-14 ASSESSMENT — PAIN SCALES - GENERAL
PAINLEVEL_OUTOF10: 9
PAINLEVEL_OUTOF10: 9

## 2018-12-14 ASSESSMENT — PAIN DESCRIPTION - LOCATION: LOCATION: BACK

## 2018-12-14 ASSESSMENT — PAIN DESCRIPTION - DESCRIPTORS: DESCRIPTORS: SHARP

## 2018-12-14 ASSESSMENT — PAIN DESCRIPTION - FREQUENCY: FREQUENCY: CONTINUOUS

## 2018-12-14 ASSESSMENT — PAIN DESCRIPTION - ORIENTATION: ORIENTATION: LOWER

## 2018-12-14 NOTE — ED PROVIDER NOTES
[address] ED  eMERGENCY dEPARTMENT eNCOUnter      Pt Name: Eloy Zavala  MRN: [id number]  [place] 1970  Date of evaluation: 12/14/18      CHIEF COMPLAINT     No chief complaint on file. HISTORY OF PRESENT ILLNESS    Eloy Zavala is a 57 y.o. female who presents complaining of Back pain. Patient states that she just had surgery 2 days ago on her back and had a laminectomy done. Patient states that she was just discharged yesterday from the hospital and she dropped her prescription soft but she could not pick them up because they needed to have a prior authorization. Patient's has been without her pain medicine all day and at this point time she says she just cannot tolerate the severe pain any longer. Patient states she has pain going down the left thigh but that was there prior to surgery. Patient has no numbness tingling or weakness in the leg. Patient's had no bowel or bladder issues. Patient states this is just her pain that she's had since surgery and that she's just gone to long without pain medicine. Patient denies any abdominal complaints. REVIEW OF SYSTEMS       Review of Systems   Constitutional: Negative for activity change, appetite change, chills, diaphoresis and fever. HENT: Negative for congestion, ear pain, facial swelling, nosebleeds, rhinorrhea, sinus pressure, sore throat and trouble swallowing. Eyes: Negative for pain, discharge and redness. Respiratory: Negative for cough, chest tightness, shortness of breath and wheezing. Cardiovascular: Negative for chest pain, palpitations and leg swelling. Gastrointestinal: Negative for abdominal pain, blood in stool, constipation, diarrhea, nausea and vomiting. Genitourinary: Negative for difficulty urinating, dysuria, flank pain, frequency, genital sores and hematuria. Musculoskeletal: Positive for back pain. Negative for arthralgias, gait problem, joint swelling, myalgias and neck pain.    Skin: Negative for Refills: 0      buPROPion (WELLBUTRIN) 100 MG tablet Take 1.5 tablets by mouth 2 times daily  Qty: 60 tablet, Refills: 3    Associated Diagnoses: Generalized anxiety disorder; GERD (gastroesophageal reflux disease)             ALLERGIES     is allergic to augmentin [amoxicillin-pot clavulanate] and codeine. SOCIAL HISTORY      reports that she has been smoking Cigarettes. She started smoking about 33 years ago. She has been smoking about 1.00 pack per day. She has never used smokeless tobacco. She reports that she drinks alcohol. She reports that she does not use drugs. PHYSICAL EXAM     INITIAL VITALS: There were no vitals taken for this visit. Physical Exam   Constitutional: She is oriented to person, place, and time. She appears well-developed and well-nourished. No distress. HENT:   Head: Normocephalic and atraumatic. Eyes: Pupils are equal, round, and reactive to light. Conjunctivae and EOM are normal. Right eye exhibits no discharge. Left eye exhibits no discharge. No scleral icterus. Cardiovascular: Normal rate, regular rhythm and normal heart sounds. Exam reveals no gallop and no friction rub. No murmur heard. Pulmonary/Chest: Effort normal and breath sounds normal. No respiratory distress. She has no wheezes. She has no rales. She exhibits no tenderness. Abdominal: Soft. Bowel sounds are normal. She exhibits no distension and no mass. There is no tenderness. There is no rebound and no guarding. Musculoskeletal: Normal range of motion. She exhibits no edema or tenderness. Neurological: She is alert and oriented to person, place, and time. She displays normal reflexes. No cranial nerve deficit. She exhibits normal muscle tone. Coordination normal.   Skin: Skin is warm and dry. No rash noted. She is not diaphoretic. No erythema. No pallor. Incision is clean dry and intact with no surrounding erythema and no discharge   Psychiatric: She has a normal mood and affect.  Her behavior is

## 2019-02-05 RX ORDER — POLYETHYLENE GLYCOL 3350 17 G/17G
POWDER, FOR SOLUTION ORAL
Qty: 1530 G | Refills: 5 | OUTPATIENT
Start: 2019-02-05

## 2019-03-28 ENCOUNTER — TELEPHONE (OUTPATIENT)
Dept: PRIMARY CARE CLINIC | Age: 49
End: 2019-03-28

## 2019-04-16 ENCOUNTER — OFFICE VISIT (OUTPATIENT)
Dept: PRIMARY CARE CLINIC | Age: 49
End: 2019-04-16
Payer: MEDICARE

## 2019-04-16 VITALS
DIASTOLIC BLOOD PRESSURE: 68 MMHG | WEIGHT: 125.6 LBS | BODY MASS INDEX: 23.11 KG/M2 | OXYGEN SATURATION: 95 % | HEIGHT: 62 IN | SYSTOLIC BLOOD PRESSURE: 118 MMHG | HEART RATE: 69 BPM

## 2019-04-16 DIAGNOSIS — J43.9 PULMONARY EMPHYSEMA, UNSPECIFIED EMPHYSEMA TYPE (HCC): ICD-10-CM

## 2019-04-16 DIAGNOSIS — Z79.899 HIGH RISK MEDICATION USE: ICD-10-CM

## 2019-04-16 DIAGNOSIS — F41.0 ANXIETY ATTACK: ICD-10-CM

## 2019-04-16 DIAGNOSIS — M54.40 ACUTE RIGHT-SIDED LOW BACK PAIN WITH SCIATICA, SCIATICA LATERALITY UNSPECIFIED: Primary | ICD-10-CM

## 2019-04-16 PROBLEM — R09.89 CHEST CONGESTION: Status: RESOLVED | Noted: 2018-04-06 | Resolved: 2019-04-16

## 2019-04-16 PROBLEM — R11.0 NAUSEA: Status: RESOLVED | Noted: 2018-04-06 | Resolved: 2019-04-16

## 2019-04-16 PROBLEM — Z20.828 EXPOSURE TO INFLUENZA: Status: RESOLVED | Noted: 2018-04-06 | Resolved: 2019-04-16

## 2019-04-16 PROBLEM — R63.4 WEIGHT LOSS: Status: RESOLVED | Noted: 2017-10-02 | Resolved: 2019-04-16

## 2019-04-16 PROBLEM — R10.11 RIGHT UPPER QUADRANT ABDOMINAL PAIN: Status: RESOLVED | Noted: 2018-06-25 | Resolved: 2019-04-16

## 2019-04-16 PROBLEM — R05.8 PRODUCTIVE COUGH: Status: RESOLVED | Noted: 2017-01-26 | Resolved: 2019-04-16

## 2019-04-16 PROBLEM — R10.9 ABDOMINAL PAIN: Status: RESOLVED | Noted: 2018-07-25 | Resolved: 2019-04-16

## 2019-04-16 PROBLEM — J04.0 ACUTE LARYNGITIS: Status: RESOLVED | Noted: 2018-04-06 | Resolved: 2019-04-16

## 2019-04-16 PROBLEM — M54.2 NECK PAIN: Status: RESOLVED | Noted: 2017-01-26 | Resolved: 2019-04-16

## 2019-04-16 PROBLEM — N30.00 ACUTE CYSTITIS WITHOUT HEMATURIA: Status: RESOLVED | Noted: 2018-06-24 | Resolved: 2019-04-16

## 2019-04-16 PROBLEM — K59.09 OTHER CONSTIPATION: Status: RESOLVED | Noted: 2018-06-27 | Resolved: 2019-04-16

## 2019-04-16 PROBLEM — R63.0 ANOREXIA: Status: RESOLVED | Noted: 2017-04-06 | Resolved: 2019-04-16

## 2019-04-16 PROBLEM — E43 SEVERE MALNUTRITION (HCC): Status: RESOLVED | Noted: 2018-06-24 | Resolved: 2019-04-16

## 2019-04-16 PROBLEM — K82.8 DYSFUNCTIONAL GALLBLADDER: Status: RESOLVED | Noted: 2018-06-24 | Resolved: 2019-04-16

## 2019-04-16 PROBLEM — M54.12 CERVICAL RADICULOPATHY: Status: RESOLVED | Noted: 2017-07-25 | Resolved: 2019-04-16

## 2019-04-16 PROBLEM — J42 CHRONIC BRONCHITIS (HCC): Status: RESOLVED | Noted: 2017-04-06 | Resolved: 2019-04-16

## 2019-04-16 PROCEDURE — 3023F SPIROM DOC REV: CPT | Performed by: FAMILY MEDICINE

## 2019-04-16 PROCEDURE — G8427 DOCREV CUR MEDS BY ELIG CLIN: HCPCS | Performed by: FAMILY MEDICINE

## 2019-04-16 PROCEDURE — 1036F TOBACCO NON-USER: CPT | Performed by: FAMILY MEDICINE

## 2019-04-16 PROCEDURE — G8420 CALC BMI NORM PARAMETERS: HCPCS | Performed by: FAMILY MEDICINE

## 2019-04-16 PROCEDURE — G8926 SPIRO NO PERF OR DOC: HCPCS | Performed by: FAMILY MEDICINE

## 2019-04-16 PROCEDURE — 99214 OFFICE O/P EST MOD 30 MIN: CPT | Performed by: FAMILY MEDICINE

## 2019-04-16 RX ORDER — ALBUTEROL SULFATE 90 UG/1
2 AEROSOL, METERED RESPIRATORY (INHALATION) EVERY 6 HOURS PRN
Qty: 1 INHALER | Refills: 3
Start: 2019-04-16 | End: 2022-03-23

## 2019-04-16 RX ORDER — BUSPIRONE HYDROCHLORIDE 30 MG/1
30 TABLET ORAL DAILY
Qty: 30 TABLET | Refills: 5 | Status: SHIPPED | OUTPATIENT
Start: 2019-04-16 | End: 2022-03-23

## 2019-04-16 RX ORDER — GABAPENTIN 400 MG/1
400 CAPSULE ORAL 3 TIMES DAILY
Qty: 90 CAPSULE | Refills: 5
Start: 2019-04-16 | End: 2022-03-23

## 2019-04-16 RX ORDER — ALPRAZOLAM 0.25 MG/1
0.25 TABLET ORAL DAILY PRN
Qty: 30 TABLET | Refills: 1 | Status: SHIPPED | OUTPATIENT
Start: 2019-04-16 | End: 2019-05-16

## 2019-04-16 NOTE — PROGRESS NOTES
[address] [facility] PRIMARY CARE  [id number] [facility]  Dept: [phone]    Mindy Ruiz is a 54 y.o. female who presents today for her medical conditions/complaintsas noted below. Chief Complaint   Patient presents with    New Patient       HPI:     HPI   Pt states was taken off all of her medications. Was on xanax for anxiety. Pt has been very agitated, has missed 3 days from work due to anxiety spells. Has been off of xanax for over a months. No tobacco for 3 weeks. States sees pain management, going to have back injections by pain management. No other pain medications. No street drugs.       LDL Cholesterol (mg/dL)   Date Value   03/13/2017 71   11/20/2015 102       (goal LDL is <100)   AST (U/L)   Date Value   06/27/2018 20     ALT (U/L)   Date Value   06/27/2018 7     BUN (mg/dL)   Date Value   06/27/2018 7     BP Readings from Last 3 Encounters:   04/16/19 118/68   12/14/18 108/68   06/27/18 117/82          (goal 120/80)    Past Medical History:   Diagnosis Date    Abdominal pain 7/25/2018    Acute cystitis without hematuria 6/24/2018    Acute laryngitis 4/6/2018    Anorexia 4/6/2017    Anxiety     Anxiety attack 6/25/2018    Asthma     Backache 1/26/2017    Cervical radiculopathy 7/25/2017    Chest congestion 4/6/2018    Chronic back pain     Chronic bronchitis (HCC) 4/6/2017    Cocaine abuse ([facility] [address] 7/25/2018    Complex cyst of left ovary 7/25/2018    COPD (chronic obstructive pulmonary disease) (HCC)     Depression     Dysfunctional gallbladder 6/24/2018    Exposure to influenza 4/6/2018    Fatigue 6/15/13    GERD (gastroesophageal reflux disease) 10/2/2017    Heart palpitations 10/28/2015    History of non anemic vitamin B12 deficiency 8/30/2017    Hoarseness, chronic 6/19/2017    Hypokalemia 6/25/2018    Insomnia 10/28/2015    Intractable vomiting with nausea     Nausea 4/6/2018    Neck pain 1/26/2017    Other constipation 2018    Ovarian cyst     Panic attacks 2017    Productive cough 2017    Right upper quadrant abdominal pain 2018    RUQ abdominal pain     Severe malnutrition ([address] 2018    Tobacco use disorder 2017    Weight loss 10/2/2017      Past Surgical History:   Procedure Laterality Date     SECTION, LOW TRANSVERSE  1999    COLONOSCOPY      ENDOSCOPY, COLON, DIAGNOSTIC      HAND TENDON SURGERY Left     wrist    INTRAUTERINE DEVICE INSERTION      Mirena    INTRAUTERINE DEVICE REMOVAL  2016    NECK SURGERY      OVARIAN CYST REMOVAL      OVARIAN CYST REMOVAL Bilateral 2005    CT ESOPHAGOGASTRODUODENOSCOPY TRANSORAL DIAGNOSTIC N/A 10/17/2017    mild esophagitis; gastritis    CT LAP,CHOLECYSTECTOMY N/A 2018    CHOLECYSTECTOMY LAPAROSCOPIC ROBOTIC MULTI PORT performed by Gabriela Bond MD at [address] N/A 2018    [facility]--FRAGMENTS OF GASTRIC ANTRAL MUCOSA W/ CHRONIC REACTIVE GASTROPATHY/CHEMICAL GASTRITIS        Family History   Problem Relation Age of Onset    High Blood Pressure Mother     Diabetes Mother     Stroke Maternal Grandmother 79       Social History     Tobacco Use    Smoking status: Former Smoker     Packs/day: 1.00     Types: Cigarettes     Start date: 1985     Last attempt to quit: 2018     Years since quittin.4    Smokeless tobacco: Never Used   Substance Use Topics    Alcohol use: Yes     Comment: once or twice a month, 3-4 drinks      Current Outpatient Medications   Medication Sig Dispense Refill    gabapentin (NEURONTIN) 400 MG capsule Take 1 capsule by mouth 3 times daily for 30 days. 90 capsule 5    busPIRone (BUSPAR) 30 MG tablet Take 30 mg by mouth daily 30 tablet 5    ALPRAZolam (XANAX) 0.25 MG tablet Take 1 tablet by mouth daily as needed for Sleep for up to 30 days.  30 tablet 1    albuterol sulfate HFA (VENTOLIN HFA) 108 (90 Base) MCG/ACT inhaler Inhale 2 puffs into the lungs every 6 hours as needed for Wheezing 1 Inhaler 3    ARIPiprazole (ABILIFY) 15 MG tablet Take 1 tablet by mouth daily 30 tablet 0    docusate sodium (COLACE, DULCOLAX) 100 MG CAPS Take 100 mg by mouth daily 30 capsule 2    SPIRIVA RESPIMAT 2.5 MCG/ACT AERS inhaler       RA ALLERGY RELIEF 10 MG tablet       benzonatate (TESSALON) 100 MG capsule       promethazine (PHENERGAN) 25 MG tablet       ibuprofen (ADVIL;MOTRIN) 800 MG tablet Take 1 tablet by mouth every 8 hours as needed for Pain 120 tablet 1    fluticasone (FLONASE) 50 MCG/ACT nasal spray       ondansetron (ZOFRAN ODT) 4 MG disintegrating tablet Take 1 tablet by mouth every 8 hours as needed for Nausea 20 tablet 0    omeprazole (PRILOSEC) 40 MG capsule TAKE ONE CAPSULE BY MOUTH DAILY 30 capsule 2    SYMBICORT 80-4.5 MCG/ACT AERO INHALE TWO PUFFS BY MOUTH TWICE A DAY 1 Inhaler 3    Nebulizers ([employer] COMPACT MINI NEBULIZER) MISC Use as directed Diagnosis: copd 1 each 0     No current facility-administered medications for this visit. Allergies   Allergen Reactions    Augmentin [Amoxicillin-Pot Clavulanate]     Codeine        Health Maintenance   Topic Date Due    Pneumococcal 0-64 years Vaccine (1 of 1 - PPSV23) 01/28/1978    HIV screen  01/28/1987    DTaP/Tdap/Td vaccine (1 - Tdap) 01/28/1991    Flu vaccine (Season Ended) 09/01/2019    Cervical cancer screen  04/10/2020    Lipid screen  03/13/2022       Subjective:      Review of Systems   Constitutional: Negative for chills and fever. HENT: Negative for rhinorrhea and sore throat. Eyes: Negative for discharge and redness. Respiratory: Negative for cough, shortness of breath and wheezing. Cardiovascular: Negative for chest pain and palpitations. Gastrointestinal: Negative for abdominal pain, diarrhea, nausea and vomiting. Genitourinary: Negative for dysuria and frequency. Musculoskeletal: Negative for arthralgias and myalgias. Neurological: Negative for dizziness, light-headedness and headaches. Psychiatric/Behavioral: Negative for sleep disturbance. Objective:     /68   Pulse 69   Ht 5' 2.04\" (1.576 m)   Wt 125 lb 9.6 oz (57 kg)   SpO2 95%   BMI 22.94 kg/m²   Physical Exam   Constitutional: She is oriented to person, place, and time. She appears well-developed and well-nourished. No distress. HENT:   Head: Normocephalic and atraumatic. Mouth/Throat: Oropharynx is clear and moist.   Eyes: Pupils are equal, round, and reactive to light. Conjunctivae are normal. Right eye exhibits no discharge. Left eye exhibits no discharge. No scleral icterus. Neck: No tracheal deviation present. No thyromegaly present. Cardiovascular: Normal rate, regular rhythm and normal heart sounds. No carotid bruits   Pulmonary/Chest: Effort normal and breath sounds normal. No respiratory distress. She has no wheezes. Musculoskeletal: She exhibits no edema. Lymphadenopathy:     She has no cervical adenopathy. Neurological: She is alert and oriented to person, place, and time. Skin: Skin is warm. No rash noted. Psychiatric: She has a normal mood and affect. Her behavior is normal. Thought content normal.   Nursing note and vitals reviewed. Controlled Substances Monitoring:     RX Monitoring 4/16/2019   Attestation The Prescription Monitoring Report for this patient was reviewed today. Chronic Pain Routine Monitoring Possible medication side effects, risk of tolerance/dependence & alternative treatments discussed. ;No signs of potential drug abuse or diversion identified: otherwise, see note documentation;Random urine drug screen sent today. Chronic Pain > 80 MEDD Obtained or confirmed a written medication contract was on file. Assessment:       Diagnosis Orders   1. Acute right-sided low back pain with sciatica, sciatica laterality unspecified  gabapentin (NEURONTIN) 400 MG capsule   2.  High risk medication use Urine Drug Screen   3. Anxiety attack  busPIRone (BUSPAR) 30 MG tablet    ALPRAZolam (XANAX) 0.25 MG tablet   4. Pulmonary emphysema, unspecified emphysema type (HCC)  albuterol sulfate HFA (VENTOLIN HFA) 108 (90 Base) MCG/ACT inhaler        Plan:    urine drug screen today  Renew meds    Return in about 3 months (around 7/16/2019). Orders Placed This Encounter   Procedures    Urine Drug Screen     Standing Status:   Future     Standing Expiration Date:   4/16/2020     Orders Placed This Encounter   Medications    gabapentin (NEURONTIN) 400 MG capsule     Sig: Take 1 capsule by mouth 3 times daily for 30 days. Dispense:  90 capsule     Refill:  5    busPIRone (BUSPAR) 30 MG tablet     Sig: Take 30 mg by mouth daily     Dispense:  30 tablet     Refill:  5    ALPRAZolam (XANAX) 0.25 MG tablet     Sig: Take 1 tablet by mouth daily as needed for Sleep for up to 30 days. Dispense:  30 tablet     Refill:  1    albuterol sulfate HFA (VENTOLIN HFA) 108 (90 Base) MCG/ACT inhaler     Sig: Inhale 2 puffs into the lungs every 6 hours as needed for Wheezing     Dispense:  1 Inhaler     Refill:  3       Patient given educationalmaterials - see patient instructions. Discussed use, benefit, and side effectsof prescribed medications. All patient questions answered. Pt voiced understanding. Reviewed health maintenance. Instructed to continue current medications, diet andexercise. Patient agreed with treatment plan. Follow up as directed.      Electronicallysigned by Sangita Villanueva MD on 4/17/2019 at 9:30 AM

## 2019-04-17 ASSESSMENT — ENCOUNTER SYMPTOMS
VOMITING: 0
SHORTNESS OF BREATH: 0
ABDOMINAL PAIN: 0
RHINORRHEA: 0
NAUSEA: 0
EYE DISCHARGE: 0
SORE THROAT: 0
WHEEZING: 0
EYE REDNESS: 0
COUGH: 0
DIARRHEA: 0

## 2019-04-22 DIAGNOSIS — Z79.899 HIGH RISK MEDICATION USE: ICD-10-CM

## 2019-05-31 ENCOUNTER — TELEPHONE (OUTPATIENT)
Dept: PRIMARY CARE CLINIC | Age: 49
End: 2019-05-31

## 2020-03-11 ENCOUNTER — HOSPITAL ENCOUNTER (OUTPATIENT)
Age: 50
Discharge: HOME | End: 2020-03-11
Payer: MEDICARE

## 2020-03-11 LAB
ABSOLUTE EOS #: 0.22 K/UL (ref 0–0.44)
ABSOLUTE IMMATURE GRANULOCYTE: <0.03 K/UL (ref 0–0.3)
ABSOLUTE LYMPH #: 1.51 K/UL (ref 1.1–3.7)
ABSOLUTE MONO #: 0.52 K/UL (ref 0.1–1.2)
ALBUMIN SERPL-MCNC: 4.2 G/DL (ref 3.5–5.2)
ALBUMIN/GLOBULIN RATIO: 1.5 (ref 1–2.5)
ALP BLD-CCNC: 76 U/L (ref 35–104)
ALT SERPL-CCNC: 23 U/L (ref 5–33)
ANION GAP SERPL CALCULATED.3IONS-SCNC: 13 MMOL/L (ref 9–17)
AST SERPL-CCNC: 20 U/L
BASOPHILS # BLD: 0 % (ref 0–2)
BASOPHILS ABSOLUTE: 0.03 K/UL (ref 0–0.2)
BILIRUB SERPL-MCNC: 0.2 MG/DL (ref 0.3–1.2)
BILIRUBIN DIRECT: <0.08 MG/DL
BILIRUBIN, INDIRECT: ABNORMAL MG/DL (ref 0–1)
BUN BLDV-MCNC: 14 MG/DL (ref 6–20)
BUN/CREAT BLD: ABNORMAL (ref 9–20)
CALCIUM SERPL-MCNC: 9.2 MG/DL (ref 8.6–10.4)
CHLORIDE BLD-SCNC: 105 MMOL/L (ref 98–107)
CO2: 23 MMOL/L (ref 20–31)
CREAT SERPL-MCNC: 0.59 MG/DL (ref 0.5–0.9)
DIFFERENTIAL TYPE: ABNORMAL
EOSINOPHILS RELATIVE PERCENT: 3 % (ref 1–4)
GFR AFRICAN AMERICAN: >60 ML/MIN
GFR NON-AFRICAN AMERICAN: >60 ML/MIN
GFR SERPL CREATININE-BSD FRML MDRD: ABNORMAL ML/MIN/{1.73_M2}
GFR SERPL CREATININE-BSD FRML MDRD: ABNORMAL ML/MIN/{1.73_M2}
GLOBULIN: ABNORMAL G/DL (ref 1.5–3.8)
GLUCOSE BLD-MCNC: 111 MG/DL (ref 70–99)
HAV IGM SER IA-ACNC: NONREACTIVE
HCG QUANTITATIVE: 1 IU/L
HCT VFR BLD CALC: 41.6 % (ref 36.3–47.1)
HEMOGLOBIN: 13.4 G/DL (ref 11.9–15.1)
HEPATITIS B CORE IGM ANTIBODY: NONREACTIVE
HEPATITIS B SURFACE ANTIGEN: NONREACTIVE
HEPATITIS C ANTIBODY: NONREACTIVE
IMMATURE GRANULOCYTES: 0 %
LYMPHOCYTES # BLD: 20 % (ref 24–43)
MCH RBC QN AUTO: 30.9 PG (ref 25.2–33.5)
MCHC RBC AUTO-ENTMCNC: 32.2 G/DL (ref 28.4–34.8)
MCV RBC AUTO: 96.1 FL (ref 82.6–102.9)
MONOCYTES # BLD: 7 % (ref 3–12)
NRBC AUTOMATED: 0 PER 100 WBC
PDW BLD-RTO: 11.5 % (ref 11.8–14.4)
PLATELET # BLD: 199 K/UL (ref 138–453)
PLATELET ESTIMATE: ABNORMAL
PMV BLD AUTO: 10.7 FL (ref 8.1–13.5)
POTASSIUM SERPL-SCNC: 4 MMOL/L (ref 3.7–5.3)
RBC # BLD: 4.33 M/UL (ref 3.95–5.11)
RBC # BLD: ABNORMAL 10*6/UL
SEG NEUTROPHILS: 70 % (ref 36–65)
SEGMENTED NEUTROPHILS ABSOLUTE COUNT: 5.12 K/UL (ref 1.5–8.1)
SODIUM BLD-SCNC: 141 MMOL/L (ref 135–144)
TOTAL PROTEIN: 7 G/DL (ref 6.4–8.3)
WBC # BLD: 7.4 K/UL (ref 3.5–11.3)
WBC # BLD: ABNORMAL 10*3/UL

## 2020-03-11 PROCEDURE — 84702 CHORIONIC GONADOTROPIN TEST: CPT

## 2020-03-11 PROCEDURE — 93005 ELECTROCARDIOGRAM TRACING: CPT | Performed by: NURSE PRACTITIONER

## 2020-03-11 PROCEDURE — 80074 ACUTE HEPATITIS PANEL: CPT

## 2020-03-11 PROCEDURE — 36415 COLL VENOUS BLD VENIPUNCTURE: CPT

## 2020-03-11 PROCEDURE — 87389 HIV-1 AG W/HIV-1&-2 AB AG IA: CPT

## 2020-03-11 PROCEDURE — 86480 TB TEST CELL IMMUN MEASURE: CPT

## 2020-03-11 PROCEDURE — 80076 HEPATIC FUNCTION PANEL: CPT

## 2020-03-11 PROCEDURE — 85025 COMPLETE CBC W/AUTO DIFF WBC: CPT

## 2020-03-11 PROCEDURE — 80048 BASIC METABOLIC PNL TOTAL CA: CPT

## 2020-03-12 LAB
EKG ATRIAL RATE: 58 BPM
EKG P AXIS: -27 DEGREES
EKG P-R INTERVAL: 122 MS
EKG Q-T INTERVAL: 398 MS
EKG QRS DURATION: 84 MS
EKG QTC CALCULATION (BAZETT): 390 MS
EKG R AXIS: 73 DEGREES
EKG T AXIS: 21 DEGREES
EKG VENTRICULAR RATE: 58 BPM

## 2020-03-12 PROCEDURE — 93010 ELECTROCARDIOGRAM REPORT: CPT | Performed by: INTERNAL MEDICINE

## 2020-03-13 LAB
HIV AG/AB: NONREACTIVE
QUANTI TB GOLD PLUS: NEGATIVE
QUANTI TB1 MINUS NIL: 0 IU/ML (ref 0–0.34)
QUANTI TB2 MINUS NIL: 0 IU/ML (ref 0–0.34)
QUANTIFERON MITOGEN: >10 IU/ML
QUANTIFERON NIL: 0.1 IU/ML

## 2022-04-08 ENCOUNTER — HOSPITAL ENCOUNTER (OUTPATIENT)
Age: 52
Discharge: HOME | End: 2022-04-08
Payer: MEDICARE

## 2022-04-08 LAB
ABSOLUTE EOS #: 0.1 K/UL (ref 0–0.4)
ABSOLUTE LYMPH #: 2.1 K/UL (ref 1–4.8)
ABSOLUTE MONO #: 0.5 K/UL (ref 0.1–1.3)
ALBUMIN SERPL-MCNC: 3.9 G/DL (ref 3.5–5.2)
ALP BLD-CCNC: 76 U/L (ref 35–104)
ALT SERPL-CCNC: 19 U/L (ref 5–33)
ANION GAP SERPL CALCULATED.3IONS-SCNC: 11 MMOL/L (ref 9–17)
AST SERPL-CCNC: 21 U/L
BASOPHILS # BLD: 1 % (ref 0–2)
BASOPHILS ABSOLUTE: 0.1 K/UL (ref 0–0.2)
BILIRUB SERPL-MCNC: 0.34 MG/DL (ref 0.3–1.2)
BUN BLDV-MCNC: 15 MG/DL (ref 6–20)
CALCIUM SERPL-MCNC: 8.8 MG/DL (ref 8.6–10.4)
CHLORIDE BLD-SCNC: 108 MMOL/L (ref 98–107)
CHOLESTEROL/HDL RATIO: 2.8
CHOLESTEROL: 137 MG/DL
CO2: 23 MMOL/L (ref 20–31)
CREAT SERPL-MCNC: 0.7 MG/DL (ref 0.5–0.9)
EOSINOPHILS RELATIVE PERCENT: 2 % (ref 0–4)
ESTIMATED AVERAGE GLUCOSE: 114 MG/DL
GFR AFRICAN AMERICAN: >60 ML/MIN
GFR NON-AFRICAN AMERICAN: >60 ML/MIN
GFR SERPL CREATININE-BSD FRML MDRD: ABNORMAL ML/MIN/{1.73_M2}
GLUCOSE BLD-MCNC: 108 MG/DL (ref 70–99)
HBA1C MFR BLD: 5.6 % (ref 4–6)
HCT VFR BLD CALC: 42.6 % (ref 36–46)
HDLC SERPL-MCNC: 49 MG/DL
HEMOGLOBIN: 14.2 G/DL (ref 12–16)
LDL CHOLESTEROL: 79 MG/DL (ref 0–130)
LYMPHOCYTES # BLD: 27 % (ref 24–44)
MCH RBC QN AUTO: 30.7 PG (ref 26–34)
MCHC RBC AUTO-ENTMCNC: 33.4 G/DL (ref 31–37)
MCV RBC AUTO: 92.1 FL (ref 80–100)
MONOCYTES # BLD: 7 % (ref 1–7)
PDW BLD-RTO: 12.4 % (ref 11.5–14.9)
PLATELET # BLD: 259 K/UL (ref 150–450)
PMV BLD AUTO: 7.7 FL (ref 6–12)
POTASSIUM SERPL-SCNC: 4.6 MMOL/L (ref 3.7–5.3)
RBC # BLD: 4.63 M/UL (ref 4–5.2)
SEG NEUTROPHILS: 63 % (ref 36–66)
SEGMENTED NEUTROPHILS ABSOLUTE COUNT: 4.8 K/UL (ref 1.3–9.1)
SODIUM BLD-SCNC: 142 MMOL/L (ref 135–144)
THYROXINE, FREE: 1.12 NG/DL (ref 0.93–1.7)
TOTAL PROTEIN: 6 G/DL (ref 6.4–8.3)
TRIGL SERPL-MCNC: 46 MG/DL
TSH SERPL DL<=0.05 MIU/L-ACNC: 6.61 UIU/ML (ref 0.3–5)
WBC # BLD: 7.6 K/UL (ref 3.5–11)

## 2022-04-08 PROCEDURE — 84443 ASSAY THYROID STIM HORMONE: CPT

## 2022-04-08 PROCEDURE — 36415 COLL VENOUS BLD VENIPUNCTURE: CPT

## 2022-04-08 PROCEDURE — 82306 VITAMIN D 25 HYDROXY: CPT

## 2022-04-08 PROCEDURE — 80053 COMPREHEN METABOLIC PANEL: CPT

## 2022-04-08 PROCEDURE — 83036 HEMOGLOBIN GLYCOSYLATED A1C: CPT

## 2022-04-08 PROCEDURE — 84439 ASSAY OF FREE THYROXINE: CPT

## 2022-04-08 PROCEDURE — 80061 LIPID PANEL: CPT

## 2022-04-08 PROCEDURE — 85025 COMPLETE CBC W/AUTO DIFF WBC: CPT

## 2022-04-09 LAB — VITAMIN D 25-HYDROXY: 19.4 NG/ML

## 2022-05-04 ENCOUNTER — HOSPITAL ENCOUNTER (OUTPATIENT)
Age: 52
Discharge: HOME | End: 2022-05-04
Payer: MEDICARE

## 2022-05-04 DIAGNOSIS — R89.9 ABNORMAL LABORATORY TEST RESULT: ICD-10-CM

## 2022-05-04 DIAGNOSIS — R79.89 ELEVATED TSH: ICD-10-CM

## 2022-05-04 LAB
THYROXINE, FREE: 1.02 NG/DL (ref 0.93–1.7)
TSH SERPL DL<=0.05 MIU/L-ACNC: 3.21 UIU/ML (ref 0.3–5)

## 2022-05-04 PROCEDURE — 36415 COLL VENOUS BLD VENIPUNCTURE: CPT

## 2022-05-04 PROCEDURE — 84443 ASSAY THYROID STIM HORMONE: CPT

## 2022-05-04 PROCEDURE — 84439 ASSAY OF FREE THYROXINE: CPT

## 2022-05-04 PROCEDURE — 86800 THYROGLOBULIN ANTIBODY: CPT

## 2022-05-04 PROCEDURE — 86376 MICROSOMAL ANTIBODY EACH: CPT

## 2022-05-05 LAB
THYROGLOBULIN AB: 15 IU/ML (ref 0–40)
THYROID PEROXIDASE (TPO) AB: <4 IU/ML (ref 0–25)

## 2022-05-09 ENCOUNTER — TELEPHONE (OUTPATIENT)
Dept: GASTROENTEROLOGY | Age: 52
End: 2022-05-09

## 2022-05-09 NOTE — TELEPHONE ENCOUNTER
New Return -  Irritable bowel syndrome with constipation - [facility] Adv     1st attempt - LVM for pt to return call to schedule OV  2nd attempt - sending letter

## 2022-05-20 ENCOUNTER — HOSPITAL ENCOUNTER (OUTPATIENT)
Dept: ULTRASOUND IMAGING | Age: 52
Discharge: HOME | End: 2022-05-22
Payer: MEDICARE

## 2022-05-20 DIAGNOSIS — R79.89 ELEVATED TSH: ICD-10-CM

## 2022-05-20 PROCEDURE — 76536 US EXAM OF HEAD AND NECK: CPT

## 2022-06-03 ENCOUNTER — TELEPHONE (OUTPATIENT)
Dept: GASTROENTEROLOGY | Age: 52
End: 2022-06-03

## 2022-06-03 NOTE — TELEPHONE ENCOUNTER
Pt no showed- I lvm for her to schedule appt. Letter sent.
The resident's documentation has been prepared under my direction and personally reviewed by me in its entirety. I confirm that the note above accurately reflects all work, treatment, procedures, and medical decision making performed by me.

## 2022-07-19 ENCOUNTER — OFFICE VISIT (OUTPATIENT)
Dept: GASTROENTEROLOGY | Age: 52
End: 2022-07-19
Payer: MEDICARE

## 2022-07-19 VITALS
SYSTOLIC BLOOD PRESSURE: 102 MMHG | DIASTOLIC BLOOD PRESSURE: 66 MMHG | HEART RATE: 77 BPM | WEIGHT: 130 LBS | BODY MASS INDEX: 23.75 KG/M2

## 2022-07-19 DIAGNOSIS — R10.13 DYSPEPSIA: Primary | ICD-10-CM

## 2022-07-19 DIAGNOSIS — K21.9 GASTROESOPHAGEAL REFLUX DISEASE, UNSPECIFIED WHETHER ESOPHAGITIS PRESENT: ICD-10-CM

## 2022-07-19 DIAGNOSIS — K59.00 CONSTIPATION, UNSPECIFIED CONSTIPATION TYPE: ICD-10-CM

## 2022-07-19 PROCEDURE — 99204 OFFICE O/P NEW MOD 45 MIN: CPT | Performed by: INTERNAL MEDICINE

## 2022-07-19 ASSESSMENT — ENCOUNTER SYMPTOMS
RESPIRATORY NEGATIVE: 1
CONSTIPATION: 1
ALLERGIC/IMMUNOLOGIC NEGATIVE: 1
TROUBLE SWALLOWING: 1
EYES NEGATIVE: 1
ABDOMINAL DISTENTION: 1

## 2022-07-19 NOTE — PROGRESS NOTES
Reason for Referral:   Bebe Gasca PA-C  [address] [facility]  [address] [facility],[address]  [place],  [address] [place] [address]    Chief Complaint   Patient presents with    Irritable Bowel Syndrome     Pt is a new return here for IBS-C. Pt states she gets really constipated and has bad gas. Pt states she also gets really bloated        1. Dyspepsia    2. Constipation, unspecified constipation type    3. Gastroesophageal reflux disease, unspecified whether esophagitis present            HISTORY OF PRESENT ILLNESS:   Patient seen with a history of severe constipation. Apparently patient has chronic constipation. She has been depending on laxatives on daily basis. Without laxatives she is not able to move bowels even for weeks. She does not strain with bowel movements. No gross hematochezia. Patient denies diarrhea. Has abdominal distention and bloating. Appears to have abdominal cramps as well. She has good appetite. GERD symptoms. Intermittently she feels food lodging in the substernal area longer. However no symptom of complete obstruction of esophagus. She does have dyspeptic symptoms as well. Denies taking NSAIDs. No prior history of known ulcer disease. Denies prior history of liver disease. She had labs done including CBC, liver tests, TSH, hemoglobin A1c and are reported to be within normal limits. Patient does not have family history of colon cancer. Patient never had colon examination in the past.    She denies taking narcotics, sedatives that can account for her constipation. Past Medical,Family, and Social History reviewed and does contribute to the patient presentingcondition. Records reviewed and last time patient was seen by me was in 2017 and at that time she had EGD done which was nonspecific. Patient's PMH/PSH,SH,PSYCH Hx, MEDs, ALLERGIES, and ROS were all reviewed and updated in the appropriate sections.     PAST MEDICAL HISTORY:  Past Medical History:   Diagnosis Date    Abdominal pain 2018    Acute cystitis without hematuria 2018    Acute laryngitis 2018    Anorexia 2017    Anxiety     Anxiety attack 2018    Asthma     Backache 2017    Cervical radiculopathy 2017    Chest congestion 2018    Chronic back pain     Chronic bronchitis ([address] 2017    Cocaine abuse ([address] 2018    Complex cyst of left ovary 2018    COPD (chronic obstructive pulmonary disease) ([address]     Depression     Dysfunctional gallbladder 2018    Exposure to influenza 2018    Fatigue 6/15/13    GERD (gastroesophageal reflux disease) 10/2/2017    Heart palpitations 10/28/2015    History of non anemic vitamin B12 deficiency 2017    Hoarseness, chronic 2017    Hypokalemia 2018    Insomnia 10/28/2015    Intractable vomiting with nausea     Nausea 2018    Neck pain 2017    Other constipation 2018    Ovarian cyst     Panic attacks 2017    Productive cough 2017    Right upper quadrant abdominal pain 2018    RUQ abdominal pain     Severe malnutrition ([address] 2018    Tobacco use disorder 2017    Weight loss 10/2/2017       Past Surgical History:   Procedure Laterality Date     SECTION, LOW TRANSVERSE      CHOLECYSTECTOMY      COLONOSCOPY      ENDOSCOPY, COLON, DIAGNOSTIC      HAND TENDON SURGERY Left     wrist    INTRAUTERINE DEVICE INSERTION      Mirena    INTRAUTERINE DEVICE REMOVAL  2016    NECK SURGERY      OVARIAN CYST REMOVAL      OVARIAN CYST REMOVAL Bilateral 2005    [place] ESOPHAGOGASTRODUODENOSCOPY TRANSORAL DIAGNOSTIC N/A 10/17/2017    mild esophagitis; gastritis    [place] LAP,CHOLECYSTECTOMY N/A 2018    CHOLECYSTECTOMY LAPAROSCOPIC ROBOTIC MULTI PORT performed by Abhay Lord MD at [address] [place] [address] N/A 2018    [facility]--FRAGMENTS OF GASTRIC ANTRAL MUCOSA W/ CHRONIC REACTIVE GASTROPATHY/CHEMICAL GASTRITIS        CURRENT MEDICATIONS:    Current Outpatient Medications:     levothyroxine (SYNTHROID) 25 MCG tablet, TAKE 1 TABLET BY MOUTH DAILY AT THE SAME TIME EVERY DAY ON AN EMPTY STOMACH.  AVOID DRINKING OR EATING 30-60 MINUTES AFTER TAKING MEDICATION, Disp: 30 tablet, Rfl: 3    vitamin D (ERGOCALCIFEROL) 1.25 MG (41837 UT) CAPS capsule, Take 1 capsule by mouth once a week, Disp: 12 capsule, Rfl: 2    albuterol sulfate HFA (VENTOLIN HFA) 108 (90 Base) MCG/ACT inhaler, Inhale 2 puffs into the lungs every 6 hours as needed for Wheezing or Shortness of Breath, Disp: 1 each, Rfl: 3    mometasone-formoterol (DULERA) 100-5 MCG/ACT inhaler, Inhale 1 puff into the lungs 2 times daily, Disp: 1 each, Rfl: 3    Plecanatide (TRULANCE) 3 MG TABS, Take 1 tablet by mouth daily (Patient not taking: Reported on 7/19/2022), Disp: 18 tablet, Rfl: 0    ALLERGIES:   Allergies   Allergen Reactions    Augmentin [Amoxicillin-Pot Clavulanate]     Codeine        FAMILY HISTORY:       Problem Relation Age of Onset    High Blood Pressure Mother     Diabetes Mother     Stroke Maternal Grandmother 79    Heart Disease Maternal Uncle     Heart Disease Paternal Aunt          SOCIAL HISTORY:   Social History     Socioeconomic History    Marital status: Legally      Spouse name: Not on file    Number of children: 1    Years of education: 9th grade    Highest education level: Not on file   Occupational History    Occupation: cleaning   Tobacco Use    Smoking status: Every Day     Packs/day: 2.00     Types: Cigarettes     Start date: 6/24/1985     Last attempt to quit: 11/16/2018     Years since quitting: 3.6    Smokeless tobacco: Never   Vaping Use    Vaping Use: Some days    Substances: Always   Substance and Sexual Activity    Alcohol use: Yes     Comment: once or twice a month, 3-4 drinks    Drug use: No    Sexual activity: Never   Other Topics Concern    Not on file   Social History Narrative    Lives with son and his boyfriend     Social Determinants of Health     Financial Resource Strain: Low Risk     Difficulty of Paying Living Expenses: Not hard at all   Food Insecurity: No Food Insecurity    Worried About Running Out of Food in the Last Year: Never true    Ran Out of Food in the Last Year: Never true   Transportation Needs: No Transportation Needs    Lack of Transportation (Medical): No    Lack of Transportation (Non-Medical): No   Physical Activity: Not on file   Stress: Not on file   Social Connections: Not on file   Intimate Partner Violence: Not on file   Housing Stability: Not on file       REVIEW OF SYSTEMS:       Review of Systems   Constitutional:  Positive for fatigue. HENT:  Positive for trouble swallowing. Eyes: Negative. Respiratory: Negative. Cardiovascular: Negative. Gastrointestinal:  Positive for abdominal distention and constipation. Endocrine: Negative. Genitourinary: Negative. Musculoskeletal: Negative. Skin: Negative. Allergic/Immunologic: Negative. Neurological: Negative. Hematological: Negative. Psychiatric/Behavioral: Negative. PHYSICAL EXAMINATION: Vital signs reviewed per the nursing documentation. /66   Pulse 77   Wt 130 lb (59 kg)   BMI 23.75 kg/m²   Body mass index is 23.75 kg/m².    Physical Exam      LABORATORY DATA: Reviewed  Lab Results   Component Value Date    WBC 7.6 04/08/2022    HGB 14.2 04/08/2022    HCT 42.6 04/08/2022    MCV 92.1 04/08/2022     04/08/2022     04/08/2022    K 4.6 04/08/2022     (H) 04/08/2022    CO2 23 04/08/2022    BUN 15 04/08/2022    CREATININE 0.70 04/08/2022    LABALBU 3.9 04/08/2022    BILITOT 0.34 04/08/2022    ALKPHOS 76 04/08/2022    AST 21 04/08/2022    ALT 19 04/08/2022         Lab Results   Component Value Date    RBC 4.63 04/08/2022    HGB 14.2 04/08/2022    MCV 92.1 04/08/2022    MCH 30.7 04/08/2022    MCHC 33.4 04/08/2022    RDW 12.4 04/08/2022    MPV 7.7 04/08/2022    BASOPCT 1 04/08/2022    LYMPHSABS 2.10 04/08/2022    MONOSABS 0.50 04/08/2022

## 2022-07-20 RX ORDER — POLYETHYLENE GLYCOL 3350 17 G/17G
238 POWDER, FOR SOLUTION ORAL ONCE
Qty: 238 G | Refills: 0 | Status: SHIPPED | OUTPATIENT
Start: 2022-07-20 | End: 2022-07-20

## 2022-07-20 RX ORDER — BISACODYL 5 MG
5 TABLET, DELAYED RELEASE (ENTERIC COATED) ORAL DAILY PRN
Qty: 4 TABLET | Refills: 0 | Status: SHIPPED | OUTPATIENT
Start: 2022-07-20

## 2022-08-15 ENCOUNTER — TELEPHONE (OUTPATIENT)
Dept: GASTROENTEROLOGY | Age: 52
End: 2022-08-15

## 2022-08-16 NOTE — TELEPHONE ENCOUNTER
Patients new PAT date is 08/18 @ 4 pm. Writer left patient a message with the new date and let her know she must answer this call or her Procedure will be cancelled.

## 2022-08-18 ENCOUNTER — HOSPITAL ENCOUNTER (OUTPATIENT)
Dept: PREADMISSION TESTING | Age: 52
Discharge: HOME | End: 2022-08-22

## 2022-08-18 VITALS — HEIGHT: 62 IN | WEIGHT: 135 LBS | BODY MASS INDEX: 24.84 KG/M2

## 2022-08-18 NOTE — PROGRESS NOTES

## 2022-08-18 NOTE — TELEPHONE ENCOUNTER
Pt left message on vm, pt states she has some questions regarding upcoming procedure and would like a return call, thanks.

## 2022-08-22 NOTE — TELEPHONE ENCOUNTER
Patient LVM that her niece spilled her miralax. Requesting a refill to be sent to her pharmacy. Would like a call back.

## 2022-08-24 RX ORDER — POLYETHYLENE GLYCOL 3350 17 G/17G
POWDER, FOR SOLUTION ORAL
Qty: 238 G | Refills: 0 | Status: SHIPPED | OUTPATIENT
Start: 2022-08-24

## 2022-08-24 NOTE — TELEPHONE ENCOUNTER
Patient LVM that the pharmacy gave her a large bottle of the miralax and unsure how much to use. Requesting a call back.

## 2022-08-24 NOTE — TELEPHONE ENCOUNTER
Writer called patient back to let her know what the Miralax is for and her prep for tomorrow and clear liquids

## 2022-09-07 ENCOUNTER — TELEPHONE (OUTPATIENT)
Dept: GASTROENTEROLOGY | Age: 52
End: 2022-09-07

## 2022-09-07 NOTE — TELEPHONE ENCOUNTER
Writer called patient to schedule Colonoscopy but got no answer asked patient to call writer back an our extension.

## 2022-09-13 NOTE — TELEPHONE ENCOUNTER
Writer spoke with patient and rescheduled her Colon/EGD and will send a new Miralax as she was given a very large bottle. She is also going to start taking Miralax for 2 weeks before the colonoscopy daily.   Patient needs a new PAT then Miralax RX sent to [facility]

## 2022-09-14 RX ORDER — POLYETHYLENE GLYCOL 3350 17 G/17G
238 POWDER, FOR SOLUTION ORAL ONCE
Qty: 238 G | Refills: 0 | Status: SHIPPED | OUTPATIENT
Start: 2022-09-14 | End: 2022-10-13

## 2022-10-03 ENCOUNTER — HOSPITAL ENCOUNTER (OUTPATIENT)
Dept: PREADMISSION TESTING | Age: 52
Discharge: HOME | End: 2022-10-07

## 2022-10-04 VITALS — BODY MASS INDEX: 24.84 KG/M2 | WEIGHT: 135 LBS | HEIGHT: 62 IN

## 2022-10-04 NOTE — PROGRESS NOTES
Pre-op Instructions For Out-Patient Endoscopy Surgery    Surgery  Colonoscopy/EGD Hyacinth 10-14 0945  Arrival 0745  Confirmed with patient  yes  Pt is vaccinated      Medication Instructions:  Please stop herbs and any supplements now (includes vitamins and minerals). N/a    Please contact your surgeon and prescribing physician for pre-op instructions for any blood thinners. N/A    If you have inhalers/aerosol treatments at home, please use them the morning of your surgery and bring the inhalers with you to the hospital. Albuterol and Dulera    Please take the following medications the morning of your surgery with a sip of water:Levothyroxine    Surgery Instructions:  After midnight before surgery:  Do not eat or drink anything, including water, mints, gum, and hard candy. You may brush your teeth without swallowing. No smoking, chewing tobacco, or street drugs. Please shower or bathe before surgery. Please do not wear any cologne, lotion, powder, jewelry, piercings, perfume, makeup, nail polish, hair accessories, or hair spray on the day of surgery. Wear loose comfortable clothing. Leave your valuables at home. Bring a storage case for any glasses/contacts. An adult who is responsible for you MUST drive you home and should be with you for the first 24 hours after surgery. Cathy     The Day of Surgery:  Arrive at [facility] AT [facility] Surgery Entrance at the time directed by your surgeon and check in at the desk. If you have a living will or healthcare power of , please bring a copy. You will be taken to the pre-op holding area where you will be prepared for surgery. A physical assessment will be performed by a nurse practitioner or house officer. Your IV will be started and you will meet your anesthesiologist.    When you go to surgery, your family will be directed to the surgical waiting room, where the doctor should speak with them after your surgery.     After surgery, you will be taken to the recovery room then when you are awake and stable you will go to the short stay unit for preparation to be discharged. Only your one designated person is allowed to come to short stay for your discharge. Pt verbalizes understanding of surgery/day of surgery instructions.

## 2022-10-13 ENCOUNTER — ANESTHESIA EVENT (OUTPATIENT)
Dept: ENDOSCOPY | Age: 52
End: 2022-10-13
Payer: MEDICARE

## 2022-10-13 RX ORDER — POLYETHYLENE GLYCOL 3350 17 G/17G
238 POWDER, FOR SOLUTION ORAL ONCE
Qty: 238 G | Refills: 0 | Status: SHIPPED | OUTPATIENT
Start: 2022-10-13 | End: 2022-10-13

## 2022-10-13 NOTE — TELEPHONE ENCOUNTER
Patients new time is November 7th at 8:30 am.   Her new PAT is 10/24/2022 at 1:00 pm.   Writer unable to gt a hold of patient but will keep trying.

## 2022-10-14 ENCOUNTER — HOSPITAL ENCOUNTER (OUTPATIENT)
Age: 52
Setting detail: OUTPATIENT SURGERY
Discharge: HOME | End: 2022-10-14
Attending: INTERNAL MEDICINE | Admitting: INTERNAL MEDICINE
Payer: MEDICARE

## 2022-10-14 ENCOUNTER — ANESTHESIA (OUTPATIENT)
Dept: ENDOSCOPY | Age: 52
End: 2022-10-14
Payer: MEDICARE

## 2022-10-14 VITALS
TEMPERATURE: 97.5 F | RESPIRATION RATE: 20 BRPM | HEIGHT: 62 IN | SYSTOLIC BLOOD PRESSURE: 109 MMHG | WEIGHT: 135 LBS | HEART RATE: 89 BPM | OXYGEN SATURATION: 96 % | BODY MASS INDEX: 24.84 KG/M2 | DIASTOLIC BLOOD PRESSURE: 89 MMHG

## 2022-10-14 DIAGNOSIS — K59.00 CONSTIPATION, UNSPECIFIED CONSTIPATION TYPE: ICD-10-CM

## 2022-10-14 DIAGNOSIS — R10.13 DYSPEPSIA: ICD-10-CM

## 2022-10-14 DIAGNOSIS — K21.9 GASTROESOPHAGEAL REFLUX DISEASE, UNSPECIFIED WHETHER ESOPHAGITIS PRESENT: ICD-10-CM

## 2022-10-14 PROCEDURE — 3700000000 HC ANESTHESIA ATTENDED CARE: Performed by: INTERNAL MEDICINE

## 2022-10-14 PROCEDURE — 2709999900 HC NON-CHARGEABLE SUPPLY: Performed by: INTERNAL MEDICINE

## 2022-10-14 PROCEDURE — 6360000002 HC RX W HCPCS: Performed by: NURSE ANESTHETIST, CERTIFIED REGISTERED

## 2022-10-14 PROCEDURE — 43239 EGD BIOPSY SINGLE/MULTIPLE: CPT | Performed by: INTERNAL MEDICINE

## 2022-10-14 PROCEDURE — 3609027000 HC COLONOSCOPY: Performed by: INTERNAL MEDICINE

## 2022-10-14 PROCEDURE — 3609012400 HC EGD TRANSORAL BIOPSY SINGLE/MULTIPLE: Performed by: INTERNAL MEDICINE

## 2022-10-14 PROCEDURE — 2500000003 HC RX 250 WO HCPCS: Performed by: NURSE ANESTHETIST, CERTIFIED REGISTERED

## 2022-10-14 PROCEDURE — 3700000001 HC ADD 15 MINUTES (ANESTHESIA): Performed by: INTERNAL MEDICINE

## 2022-10-14 PROCEDURE — 7100000011 HC PHASE II RECOVERY - ADDTL 15 MIN: Performed by: INTERNAL MEDICINE

## 2022-10-14 PROCEDURE — 7100000010 HC PHASE II RECOVERY - FIRST 15 MIN: Performed by: INTERNAL MEDICINE

## 2022-10-14 PROCEDURE — 88305 TISSUE EXAM BY PATHOLOGIST: CPT

## 2022-10-14 PROCEDURE — 45378 DIAGNOSTIC COLONOSCOPY: CPT | Performed by: INTERNAL MEDICINE

## 2022-10-14 PROCEDURE — 2580000003 HC RX 258: Performed by: ANESTHESIOLOGY

## 2022-10-14 RX ORDER — SODIUM CHLORIDE, SODIUM LACTATE, POTASSIUM CHLORIDE, CALCIUM CHLORIDE 600; 310; 30; 20 MG/100ML; MG/100ML; MG/100ML; MG/100ML
INJECTION, SOLUTION INTRAVENOUS CONTINUOUS
Status: DISCONTINUED | OUTPATIENT
Start: 2022-10-14 | End: 2022-10-14 | Stop reason: HOSPADM

## 2022-10-14 RX ORDER — SODIUM CHLORIDE 0.9 % (FLUSH) 0.9 %
5-40 SYRINGE (ML) INJECTION PRN
Status: DISCONTINUED | OUTPATIENT
Start: 2022-10-14 | End: 2022-10-14 | Stop reason: HOSPADM

## 2022-10-14 RX ORDER — PROPOFOL 10 MG/ML
INJECTION, EMULSION INTRAVENOUS CONTINUOUS PRN
Status: DISCONTINUED | OUTPATIENT
Start: 2022-10-14 | End: 2022-10-14 | Stop reason: SDUPTHER

## 2022-10-14 RX ORDER — HYDRALAZINE HYDROCHLORIDE 20 MG/ML
10 INJECTION INTRAMUSCULAR; INTRAVENOUS
Status: DISCONTINUED | OUTPATIENT
Start: 2022-10-14 | End: 2022-10-14 | Stop reason: HOSPADM

## 2022-10-14 RX ORDER — LIDOCAINE HYDROCHLORIDE 10 MG/ML
INJECTION, SOLUTION INFILTRATION; PERINEURAL PRN
Status: DISCONTINUED | OUTPATIENT
Start: 2022-10-14 | End: 2022-10-14 | Stop reason: SDUPTHER

## 2022-10-14 RX ORDER — MIDAZOLAM HYDROCHLORIDE 1 MG/ML
INJECTION INTRAMUSCULAR; INTRAVENOUS PRN
Status: DISCONTINUED | OUTPATIENT
Start: 2022-10-14 | End: 2022-10-14 | Stop reason: SDUPTHER

## 2022-10-14 RX ORDER — FENTANYL CITRATE 50 UG/ML
25 INJECTION, SOLUTION INTRAMUSCULAR; INTRAVENOUS EVERY 5 MIN PRN
Status: DISCONTINUED | OUTPATIENT
Start: 2022-10-14 | End: 2022-10-14 | Stop reason: HOSPADM

## 2022-10-14 RX ORDER — SODIUM CHLORIDE 0.9 % (FLUSH) 0.9 %
5-40 SYRINGE (ML) INJECTION EVERY 12 HOURS SCHEDULED
Status: DISCONTINUED | OUTPATIENT
Start: 2022-10-14 | End: 2022-10-14 | Stop reason: HOSPADM

## 2022-10-14 RX ORDER — SODIUM CHLORIDE 9 MG/ML
INJECTION, SOLUTION INTRAVENOUS PRN
Status: DISCONTINUED | OUTPATIENT
Start: 2022-10-14 | End: 2022-10-14 | Stop reason: HOSPADM

## 2022-10-14 RX ORDER — ACETAMINOPHEN 500 MG
500 TABLET ORAL EVERY 6 HOURS PRN
COMMUNITY
Start: 2022-05-10

## 2022-10-14 RX ORDER — GLYCOPYRROLATE 0.2 MG/ML
INJECTION INTRAMUSCULAR; INTRAVENOUS PRN
Status: DISCONTINUED | OUTPATIENT
Start: 2022-10-14 | End: 2022-10-14 | Stop reason: SDUPTHER

## 2022-10-14 RX ORDER — LIDOCAINE HYDROCHLORIDE 10 MG/ML
1 INJECTION, SOLUTION EPIDURAL; INFILTRATION; INTRACAUDAL; PERINEURAL
Status: DISCONTINUED | OUTPATIENT
Start: 2022-10-14 | End: 2022-10-14 | Stop reason: HOSPADM

## 2022-10-14 RX ORDER — PROPOFOL 10 MG/ML
INJECTION, EMULSION INTRAVENOUS PRN
Status: DISCONTINUED | OUTPATIENT
Start: 2022-10-14 | End: 2022-10-14 | Stop reason: SDUPTHER

## 2022-10-14 RX ORDER — LABETALOL HYDROCHLORIDE 5 MG/ML
10 INJECTION, SOLUTION INTRAVENOUS
Status: DISCONTINUED | OUTPATIENT
Start: 2022-10-14 | End: 2022-10-14 | Stop reason: HOSPADM

## 2022-10-14 RX ORDER — METOCLOPRAMIDE HYDROCHLORIDE 5 MG/ML
10 INJECTION INTRAMUSCULAR; INTRAVENOUS
Status: DISCONTINUED | OUTPATIENT
Start: 2022-10-14 | End: 2022-10-14 | Stop reason: HOSPADM

## 2022-10-14 RX ORDER — DIPHENHYDRAMINE HYDROCHLORIDE 50 MG/ML
12.5 INJECTION INTRAMUSCULAR; INTRAVENOUS
Status: DISCONTINUED | OUTPATIENT
Start: 2022-10-14 | End: 2022-10-14 | Stop reason: HOSPADM

## 2022-10-14 RX ORDER — MEPERIDINE HYDROCHLORIDE 25 MG/ML
12.5 INJECTION INTRAMUSCULAR; INTRAVENOUS; SUBCUTANEOUS EVERY 5 MIN PRN
Status: DISCONTINUED | OUTPATIENT
Start: 2022-10-14 | End: 2022-10-14 | Stop reason: HOSPADM

## 2022-10-14 RX ORDER — ONDANSETRON 2 MG/ML
4 INJECTION INTRAMUSCULAR; INTRAVENOUS
Status: DISCONTINUED | OUTPATIENT
Start: 2022-10-14 | End: 2022-10-14 | Stop reason: HOSPADM

## 2022-10-14 RX ADMIN — PROPOFOL 200 MCG/KG/MIN: 10 INJECTION, EMULSION INTRAVENOUS at 10:46

## 2022-10-14 RX ADMIN — SODIUM CHLORIDE, POTASSIUM CHLORIDE, SODIUM LACTATE AND CALCIUM CHLORIDE: 600; 310; 30; 20 INJECTION, SOLUTION INTRAVENOUS at 10:00

## 2022-10-14 RX ADMIN — LIDOCAINE HYDROCHLORIDE 40 MG: 10 INJECTION, SOLUTION INFILTRATION; PERINEURAL at 10:46

## 2022-10-14 RX ADMIN — MIDAZOLAM 2 MG: 1 INJECTION INTRAMUSCULAR; INTRAVENOUS at 10:45

## 2022-10-14 RX ADMIN — GLYCOPYRROLATE 0.2 MG: 0.2 INJECTION, SOLUTION INTRAMUSCULAR; INTRAVENOUS at 10:45

## 2022-10-14 RX ADMIN — PROPOFOL 100 MG: 10 INJECTION, EMULSION INTRAVENOUS at 10:46

## 2022-10-14 ASSESSMENT — ENCOUNTER SYMPTOMS
CHEST TIGHTNESS: 0
WHEEZING: 0
COUGH: 1
SHORTNESS OF BREATH: 0
SINUS PRESSURE: 0
RHINORRHEA: 0
STRIDOR: 0
SORE THROAT: 0
SHORTNESS OF BREATH: 1
APNEA: 0
BACK PAIN: 0
SINUS PAIN: 0
TROUBLE SWALLOWING: 0

## 2022-10-14 ASSESSMENT — PAIN SCALES - WONG BAKER: WONGBAKER_NUMERICALRESPONSE: 0

## 2022-10-14 ASSESSMENT — PAIN - FUNCTIONAL ASSESSMENT: PAIN_FUNCTIONAL_ASSESSMENT: 0-10

## 2022-10-14 ASSESSMENT — PAIN DESCRIPTION - DESCRIPTORS: DESCRIPTORS: ACHING

## 2022-10-14 ASSESSMENT — LIFESTYLE VARIABLES: SMOKING_STATUS: 0

## 2022-10-14 NOTE — ANESTHESIA POSTPROCEDURE EVALUATION
POST- ANESTHESIA EVALUATION       Pt Name: Sandra Lizararga  MRN: [id number]  YOB: 1970  Date of evaluation: 10/14/2022  Time:  11:57 AM      BP 90/73   Pulse 88   Temp 97.3 °F (36.3 °C) (Infrared)   Resp 23   Ht 5' 2\" (1.575 m)   Wt 135 lb (61.2 kg)   LMP 03/10/2017   SpO2 96%   BMI 24.69 kg/m²      Consciousness Level  Awake  Cardiopulmonary Status  Stable  Pain Adequately Treated YES  Nausea / Vomiting  NO  Adequate Hydration  YES  Anesthesia Related Complications NONE      Electronically signed by Nancy Vidal MD on 10/14/2022 at 11:57 AM       Department of Anesthesiology  Postprocedure Note    Patient: Sandra Lizarraga  MRN: [id number]  YOB: 1970  Date of evaluation: 10/14/2022      Procedure Summary     Date: 10/14/22 Room / Location: [place] [address] / [facility]    Anesthesia Start: 1042 Anesthesia Stop: 6700    Procedures:       EGD BIOPSY (Esophagus)      COLONOSCOPY DIAGNOSTIC Diagnosis:       Dyspepsia      Gastroesophageal reflux disease, unspecified whether esophagitis present      Constipation, unspecified constipation type      (DYSPEPSIA/GERD/CONSTIPATION)    Surgeons: Travis Olmedo MD Responsible Provider: Nancy Vidal MD    Anesthesia Type: general ASA Status: 3          Anesthesia Type: No value filed.     Guzman Phase I:      Guzman Phase II: Guzman Score: 5      Anesthesia Post Evaluation

## 2022-10-14 NOTE — H&P
HISTORY and [facility] [address]       NAME:  Karyna Morataya  MRN: [id number]   YOB: 1970   Date: 10/14/2022   Age: 50 y.o. Gender: female       COMPLAINT AND PRESENT HISTORY:   Karyna Morataya  is a 50 y.o. female presenting today for EGD BIOPSY, COLONOSCOPY DIAGNOSTIC as r/t DYSPEPSIA/GERD/CONSTIPATION. Pt denies any gi symptoms including n/v/d/c, no abdominal pain, no bloody or tarry stools. She denies any dysphagia, sore throat or voice change. Pt reports completing bowel prep without complications, last BM reported this morning. She  states last BM was clear with no stool particles. Pt has a PMHX significant for COPD, thyroid dx         NPO since midnight. Sip of water with meds  Pt does wear dentures. Pt denies any hx of MRSA infection  Pt not currently taking any blood thinners or anticoagulants  Pt denies any personal or FHx of complications with anesthesia. Pt denies any acute symptoms of illness at this time including no SOB, CP, fever, URI or UTI symptoms. RECENT IMAGING    No results found.      PAST MEDICAL HISTORY     Past Medical History:   Diagnosis Date    Abdominal pain 07/25/2018    Acute cystitis without hematuria 06/24/2018    Acute laryngitis 04/06/2018    Anorexia 04/06/2017    Anxiety     Anxiety attack 06/25/2018    Asthma     Backache 01/26/2017    Cervical radiculopathy 07/25/2017    Chest congestion 04/06/2018    Chronic back pain     Chronic bronchitis ([address] 04/06/2017    Cocaine abuse ([address] 07/25/2018    Complex cyst of left ovary 07/25/2018    COPD (chronic obstructive pulmonary disease) ([address]     Depression     Dysfunctional gallbladder 06/24/2018    Exposure to influenza 04/06/2018    Fatigue 06/15/2013    GERD (gastroesophageal reflux disease) 10/02/2017    Heart palpitations 10/28/2015    History of non anemic vitamin B12 deficiency 08/30/2017    Hoarseness, chronic 06/19/2017    Hypokalemia 06/25/2018    Insomnia 10/28/2015    Intractable vomiting with nausea     Nausea 2018    Neck pain 2017    Other constipation 2018    Ovarian cyst     Panic attacks 2017    Poor venous access     Productive cough 2017    Right upper quadrant abdominal pain 2018    RUQ abdominal pain     Severe malnutrition ([address] 2018    Thyroid disease     Tobacco use disorder 2017    Wears dentures     uppers and lowers    Weight loss 10/02/2017       SURGICAL HISTORY       Past Surgical History:   Procedure Laterality Date    BACK SURGERY      lumbar back surgery     SECTION, LOW TRANSVERSE      CHOLECYSTECTOMY      COLONOSCOPY      ENDOSCOPY, COLON, DIAGNOSTIC      HAND TENDON SURGERY Left     wrist    INTRAUTERINE DEVICE INSERTION      Mirena    INTRAUTERINE DEVICE REMOVAL  2016    NECK SURGERY      OTHER SURGICAL HISTORY      Per pt report polyps removed from voice box    OVARIAN CYST REMOVAL      OVARIAN CYST REMOVAL Bilateral 2005    [place] ESOPHAGOGASTRODUODENOSCOPY TRANSORAL DIAGNOSTIC N/A 10/17/2017    mild esophagitis; gastritis    [place] LAP,CHOLECYSTECTOMY N/A 2018    CHOLECYSTECTOMY LAPAROSCOPIC ROBOTIC MULTI PORT performed by Kelly Patiño MD at [address] N/A 2018    [facility]--FRAGMENTS OF GASTRIC ANTRAL MUCOSA W/ CHRONIC REACTIVE GASTROPATHY/CHEMICAL GASTRITIS        FAMILY HISTORY       Family History   Problem Relation Age of Onset    High Blood Pressure Mother     Diabetes Mother     Stroke Maternal Grandmother 79    Heart Disease Maternal Uncle     Heart Disease Paternal Aunt        SOCIAL HISTORY       Social History     Socioeconomic History    Marital status: Legally     Number of children: 1    Years of education: 9th grade   Occupational History    Occupation: cleaning   Tobacco Use    Smoking status: Every Day     Packs/day: 2.00     Types: Cigarettes     Start date: 1985     Last attempt to quit: 2018     Years since quitting: 3.9    Smokeless tobacco: Never   Vaping Use    Vaping Use: Former   Substance and Sexual Activity    Alcohol use: Yes     Comment: once or twice a month, 3-4 drinks    Drug use: No    Sexual activity: Not Currently   Social History Narrative    Lives with son and his boyfriend     Social Determinants of Health     Financial Resource Strain: Low Risk     Difficulty of Paying Living Expenses: Not hard at all   Food Insecurity: No Food Insecurity    Worried About [address] in the Last Year: Never true    [facility] [employer] in the Last Year: Never true   Transportation Needs: No Transportation Needs    Lack of Transportation (Medical): No    Lack of Transportation (Non-Medical): No           REVIEW OF SYSTEMS      Allergies   Allergen Reactions    Augmentin [Amoxicillin-Pot Clavulanate]     Codeine        No current facility-administered medications on file prior to encounter. Current Outpatient Medications on File Prior to Encounter   Medication Sig Dispense Refill    polyethylene glycol (GLYCOLAX) 17 GM/SCOOP powder Follow instructions given by provider 238 g 0    magnesium citrate solution Take 296 mLs by mouth once for 1 dose Follow instructions given by provider office 1 mL 0    bisacodyl 5 MG EC tablet Take 1 tablet by mouth daily as needed for Constipation 4 tablet 0    levothyroxine (SYNTHROID) 25 MCG tablet TAKE 1 TABLET BY MOUTH DAILY AT THE SAME TIME EVERY DAY ON AN EMPTY STOMACH.  AVOID DRINKING OR EATING 30-60 MINUTES AFTER TAKING MEDICATION 30 tablet 3    vitamin D (ERGOCALCIFEROL) 1.25 MG (88619 UT) CAPS capsule Take 1 capsule by mouth once a week 12 capsule 2    albuterol sulfate HFA (VENTOLIN HFA) 108 (90 Base) MCG/ACT inhaler Inhale 2 puffs into the lungs every 6 hours as needed for Wheezing or Shortness of Breath 1 each 3    mometasone-formoterol (DULERA) 100-5 MCG/ACT inhaler Inhale 1 puff into the lungs 2 times daily 1 each 3        Review of Systems   Constitutional:  Negative for chills, diaphoresis, fatigue and fever. HENT:  Positive for dental problem. Negative for congestion, ear pain, postnasal drip, rhinorrhea, sinus pressure, sinus pain, sore throat and trouble swallowing. Respiratory:  Positive for cough and shortness of breath. Negative for apnea, chest tightness and wheezing. Cardiovascular:  Negative for chest pain, palpitations and leg swelling. Gastrointestinal:         SEE HPI    Genitourinary:  Negative for dysuria, flank pain, frequency and hematuria. Musculoskeletal:  Negative for back pain, joint swelling and myalgias. Skin:  Negative for rash and wound. Neurological:  Negative for dizziness, weakness, numbness and headaches. Hematological:  Does not bruise/bleed easily. Psychiatric/Behavioral:  Negative for agitation and confusion. The patient is not nervous/anxious. See HPI    GENERAL PHYSICAL EXAM:     Vitals: See nurse flow sheet     Physical Exam  Constitutional:       General: She is not in acute distress. Appearance: Normal appearance. She is well-developed and normal weight. She is not ill-appearing or toxic-appearing. HENT:      Head: Normocephalic and atraumatic. Mouth/Throat:      Mouth: Mucous membranes are dry. Pharynx: Oropharynx is clear. No oropharyngeal exudate or posterior oropharyngeal erythema. Comments: Edentulous   Eyes:      Extraocular Movements: Extraocular movements intact. Conjunctiva/sclera: Conjunctivae normal.      Pupils: Pupils are equal, round, and reactive to light. Cardiovascular:      Rate and Rhythm: Normal rate and regular rhythm. Pulses: Normal pulses. Heart sounds: Normal heart sounds. No murmur heard. No friction rub. No gallop. Pulmonary:      Effort: Pulmonary effort is normal.      Breath sounds: Rhonchi present. No wheezing. Comments: Coughing   Abdominal:      General: Bowel sounds are normal. There is no distension.       Palpations: Abdomen is soft.      Tenderness: There is no abdominal tenderness. There is no guarding or rebound. Comments: Hyperactive bs. Musculoskeletal:         General: No swelling. Normal range of motion. Cervical back: Normal range of motion and neck supple. No rigidity or tenderness. Right lower leg: No edema. Left lower leg: No edema. Skin:     General: Skin is warm and dry. Findings: No erythema. Neurological:      General: No focal deficit present. Mental Status: She is alert and oriented to person, place, and time. Mental status is at baseline. Sensory: No sensory deficit. Psychiatric:         Mood and Affect: Mood normal.         Behavior: Behavior normal.         Thought Content:  Thought content normal.         Judgment: Judgment normal.                                                                                       PROVISIONAL DIAGNOSES / SURGERY:      EGD BIOPSY, COLONOSCOPY DIAGNOSTIC     DYSPEPSIA/GERD/CONSTIPATION    Patient Active Problem List    Diagnosis Date Noted    Cocaine abuse ([facility] 07/25/2018    Complex cyst of left ovary 07/25/2018    Hypokalemia 06/25/2018    Anxiety attack 06/25/2018    GERD (gastroesophageal reflux disease) 10/02/2017    History of non anemic vitamin B12 deficiency 08/30/2017    Panic attacks 07/25/2017    Hoarseness, chronic 06/19/2017    Backache 01/26/2017    Tobacco use disorder 01/26/2017    Asthma 12/20/2016    COPD (chronic obstructive pulmonary disease) ([facility] 12/20/2016    Depression 12/20/2016    Heart palpitations 10/28/2015    Insomnia 10/28/2015               LISANDRA Landa CNP on 10/14/2022 at 9:23 AM

## 2022-10-14 NOTE — OP NOTE
ESOPHAGOGASTRODUODENOSCOPY   ( EGD )  DATE OF PROCEDURE: 10/14/2022     SURGEON: Amberly Burnham MD    ASSISTANT: None    PREOPERATIVE DIAGNOSIS: Patient has chronic dyspepsia, GERD. Procedure performed to evaluate upper GI lesions    POSTOPERATIVE DIAGNOSIS: Mild duodenitis    OPERATION: Upper GI endoscopy with Biopsy    ANESTHESIA: MAC    ESTIMATED BLOOD LOSS: None    COMPLICATIONS: None. SPECIMENS:  Was Obtained: Random biopsies from the body of the stomach and antrum to check for H. pylori    Random biopsies from the esophagus to check for microscopic esophagitis    HISTORY: The patient is a 50y.o. year old female with history of above preop diagnosis. I recommended esophagogastroduodenoscopy with possible biopsy and I explained the risk, benefits, expected outcome, and alternatives to the procedure. Risks included but are not limited to bleeding, infection, respiratory distress, hypotension, and perforation of the esophagus, stomach, or duodenum. Patient understands and is in agreement. PROCEDURE: The patient was given IV conscious sedation. The patient's SPO2 remained above 90% throughout the procedure. Cetacaine spray given. Patient placed in left lateral position. Olympus  videogastroscope was inserted orally under vision into the esophagus without difficulty and advanced into the stomach then through the pylorus up to the second part of duodenum. Findings:    Retropharyngeal area was grossly normal appearing    Esophagus: normal.  May have couple of centimeters long sliding hiatal hernia. Squamocolumnar junction is at about 33 cm. No endoscopic evidence of esophagitis or Delgado's mucosa seen.   Random biopsies taken to check for microscopic esophagitis    Stomach:    Fundus and Cardia Examined in Retroflexed View: normal    Body: normal    Antrum: normal    Duodenum:     Descending: normal    Bulb: normal    While withdrawing the scope the above findings were verified and the scope was removed. The patient has tolerated the procedure without unusual events.          Recommendations/Plan:   F/U Biopsies  F/U In Office as instructed  Discussed with the family                   Electronically signed by Geetha Chew MD  on 10/14/2022 at 10:53 AM

## 2022-10-14 NOTE — ANESTHESIA PRE PROCEDURE
Department of Anesthesiology  Preprocedure Note       Name:  Prema Odonnell   Age:  50 y.o.  :  1970                                          MRN:  [id number]         Date:  10/14/2022      Surgeon: Tiara Cotter):  Geetha Chew MD    Procedure: Procedure(s):  EGD BIOPSY  COLONOSCOPY DIAGNOSTIC    Medications prior to admission:   Prior to Admission medications    Medication Sig Start Date End Date Taking? Authorizing Provider   acetaminophen (TYLENOL) 500 MG tablet Take 500 mg by mouth every 6 hours as needed 5/10/22  Yes Historical Provider, MD   polyethylene glycol (GLYCOLAX) 17 GM/SCOOP powder Follow instructions given by provider 22   LISANDRA Hernandez NP   magnesium citrate solution Take 296 mLs by mouth once for 1 dose Follow instructions given by provider office 22  LISANDRA Hernandez NP   bisacodyl 5 MG EC tablet Take 1 tablet by mouth daily as needed for Constipation 22   LISANDRA Hernandez NP   levothyroxine (SYNTHROID) 25 MCG tablet TAKE 1 TABLET BY MOUTH DAILY AT THE SAME TIME EVERY DAY ON AN EMPTY STOMACH.  AVOID DRINKING OR EATING 30-60 MINUTES AFTER TAKING MEDICATION 22   Sean Guzman PA-C   vitamin D (ERGOCALCIFEROL) 1.25 MG (99500 UT) CAPS capsule Take 1 capsule by mouth once a week 22   Sean Guzman PA-C   albuterol sulfate HFA (VENTOLIN HFA) 108 (90 Base) MCG/ACT inhaler Inhale 2 puffs into the lungs every 6 hours as needed for Wheezing or Shortness of Breath 3/23/22   Sean Guzman PA-C   mometasone-formoterol [facility]) 100-5 MCG/ACT inhaler Inhale 1 puff into the lungs 2 times daily 3/23/22   Sean Guzman PA-C       Current medications:    Current Facility-Administered Medications   Medication Dose Route Frequency Provider Last Rate Last Admin    sodium chloride flush 0.9 % injection 5-40 mL  5-40 mL IntraVENous 2 times per day Med Loyd MD        sodium chloride flush 0.9 % injection 5-40 mL  5-40 mL IntraVENous PRN Charmaine Francisco MD        0.9 % sodium chloride infusion   IntraVENous PRN Charmaine Francisco MD        lactated ringers infusion   IntraVENous Continuous Sky Dillard MD        lidocaine PF 1 % injection 1 mL  1 mL IntraDERmal Once PRN Charmaine Francisco MD           Allergies: Allergies   Allergen Reactions    Augmentin [Amoxicillin-Pot Clavulanate]     Codeine        Problem List:    Patient Active Problem List   Diagnosis Code    Heart palpitations R00.2    Insomnia G47.00    GERD (gastroesophageal reflux disease) K21.9    Hypokalemia E87.6    Anxiety attack F41.0    Asthma J45.909    Backache M54.9    Cocaine abuse ([address] F14.10    Complex cyst of left ovary N83.292    COPD (chronic obstructive pulmonary disease) ([facility] J44.9    Depression F32. A    History of non anemic vitamin B12 deficiency Z86.39    Hoarseness, chronic R49.0    Panic attacks F41.0    Tobacco use disorder F17.200       Past Medical History:        Diagnosis Date    Abdominal pain 07/25/2018    Acute cystitis without hematuria 06/24/2018    Acute laryngitis 04/06/2018    Anorexia 04/06/2017    Anxiety     Anxiety attack 06/25/2018    Asthma     Backache 01/26/2017    Cervical radiculopathy 07/25/2017    Chest congestion 04/06/2018    Chronic back pain     Chronic bronchitis (HCC) 04/06/2017    Cocaine abuse ([facility] 07/25/2018    Complex cyst of left ovary 07/25/2018    COPD (chronic obstructive pulmonary disease) (HCC)     Depression     Dysfunctional gallbladder 06/24/2018    Exposure to influenza 04/06/2018    Fatigue 06/15/2013    GERD (gastroesophageal reflux disease) 10/02/2017    Heart palpitations 10/28/2015    History of non anemic vitamin B12 deficiency 08/30/2017    Hoarseness, chronic 06/19/2017    Hypokalemia 06/25/2018    Insomnia 10/28/2015    Intractable vomiting with nausea     Nausea 04/06/2018    Neck pain 01/26/2017    Other constipation 06/27/2018    Ovarian cyst     Panic attacks 2017    Poor venous access     Productive cough 2017    Right upper quadrant abdominal pain 2018    RUQ abdominal pain     Severe malnutrition ([address] 2018    Thyroid disease     Tobacco use disorder 2017    Wears dentures     uppers and lowers    Weight loss 10/02/2017       Past Surgical History:        Procedure Laterality Date    BACK SURGERY      lumbar back surgery     SECTION, LOW TRANSVERSE  1999    CHOLECYSTECTOMY      COLONOSCOPY      ENDOSCOPY, COLON, DIAGNOSTIC      HAND TENDON SURGERY Left     wrist    INTRAUTERINE DEVICE INSERTION      Mirena    INTRAUTERINE DEVICE REMOVAL  2016    NECK SURGERY      OTHER SURGICAL HISTORY      Per pt report polyps removed from voice box    OVARIAN CYST REMOVAL      OVARIAN CYST REMOVAL Bilateral 2005    NE ESOPHAGOGASTRODUODENOSCOPY TRANSORAL DIAGNOSTIC N/A 10/17/2017    mild esophagitis; gastritis    NE LAP,CHOLECYSTECTOMY N/A 2018    CHOLECYSTECTOMY LAPAROSCOPIC ROBOTIC MULTI PORT performed by Charley English MD at [address] 2018    [facility]--FRAGMENTS OF GASTRIC ANTRAL MUCOSA W/ CHRONIC REACTIVE GASTROPATHY/CHEMICAL GASTRITIS        Social History:    Social History     Tobacco Use    Smoking status: Every Day     Packs/day: 2.00     Types: Cigarettes     Start date: 1985     Last attempt to quit: 2018     Years since quitting: 3.9    Smokeless tobacco: Never   Substance Use Topics    Alcohol use: Yes     Comment: once or twice a month, 3-4 drinks                                Ready to quit: Not Answered  Counseling given: Not Answered      Vital Signs (Current):   Vitals:    10/14/22 0931   BP: 101/75   Pulse: 91   Resp: 20   Temp: 97.5 °F (36.4 °C)   TempSrc: Infrared   SpO2: 96%   Weight: 135 lb (61.2 kg)   Height: 5' 2\" (1.575 m)                                              BP Readings from Last 3 Encounters:   10/14/22 101/75   07/19/22 102/66   05/06/22 98/68       NPO Status: Time of last liquid consumption: 2300                        Time of last solid consumption: 1800                        Date of last liquid consumption: 10/13/22                        Date of last solid food consumption: 10/12/22    BMI:   Wt Readings from Last 3 Encounters:   10/14/22 135 lb (61.2 kg)   10/04/22 135 lb (61.2 kg)   08/18/22 135 lb (61.2 kg)     Body mass index is 24.69 kg/m². CBC:   Lab Results   Component Value Date/Time    WBC 7.6 04/08/2022 01:27 PM    RBC 4.63 04/08/2022 01:27 PM    HGB 14.2 04/08/2022 01:27 PM    HCT 42.6 04/08/2022 01:27 PM    MCV 92.1 04/08/2022 01:27 PM    RDW 12.4 04/08/2022 01:27 PM     04/08/2022 01:27 PM     LR    CMP:   Lab Results   Component Value Date/Time     04/08/2022 01:27 PM    K 4.6 04/08/2022 01:27 PM     04/08/2022 01:27 PM    CO2 23 04/08/2022 01:27 PM    BUN 15 04/08/2022 01:27 PM    CREATININE 0.70 04/08/2022 01:27 PM    GFRAA >60 04/08/2022 01:27 PM    LABGLOM >60 04/08/2022 01:27 PM    GLUCOSE 108 04/08/2022 01:27 PM    PROT 6.0 04/08/2022 01:27 PM    CALCIUM 8.8 04/08/2022 01:27 PM    BILITOT 0.34 04/08/2022 01:27 PM    ALKPHOS 76 04/08/2022 01:27 PM    AST 21 04/08/2022 01:27 PM    ALT 19 04/08/2022 01:27 PM       POC Tests: No results for input(s): POCGLU, POCNA, POCK, POCCL, POCBUN, POCHEMO, POCHCT in the last 72 hours.     Coags: No results found for: PROTIME, INR, APTT    HCG (If Applicable):   Lab Results   Component Value Date    PREGTESTUR NEGATIVE 06/26/2018    HCG NEGATIVE 10/17/2017    HCGQUANT 1 03/11/2020        ABGs: No results found for: PHART, PO2ART, ULL8ZPW, ITE1IQM, BEART, I2XDSFCB     Type & Screen (If Applicable):  No results found for: LABABO, LABRH    Drug/Infectious Status (If Applicable):  Lab Results   Component Value Date/Time    HEPCAB NONREACTIVE 03/11/2020 11:13 AM       COVID-19 Screening (If Applicable): No results found for: COVID19        Anesthesia Evaluation  Patient summary reviewed and Nursing notes reviewed no history of anesthetic complications:   Airway: Mallampati: III  TM distance: >3 FB   Neck ROM: full  Mouth opening: > = 3 FB   Dental:          Pulmonary:normal exam  breath sounds clear to auscultation  (+) COPD:  asthma: allergic asthma,     (-) pneumonia, shortness of breath, recent URI, sleep apnea, rhonchi, wheezes, rales, stridor, not a current smoker and no decreased breath sounds                           Cardiovascular:  Exercise tolerance: good (>4 METS),       (-) pacemaker, hypertension, valvular problems/murmurs, past MI, CAD, CABG/stent, dysrhythmias,  angina,  CHF, orthopnea, PND,  GODINEZ, murmur, weak pulses,  friction rub, systolic click, carotid bruit,  JVD, peripheral edema, no pulmonary hypertension and no hyperlipidemia    ECG reviewed  Rhythm: regular  Rate: normal           Beta Blocker:  Not on Beta Blocker         Neuro/Psych:   (+) neuromuscular disease:, psychiatric history:   (-) seizures, TIA, CVA, headaches and depression/anxiety            GI/Hepatic/Renal:   (+) GERD: no interval change,      (-) hiatal hernia, PUD, hepatitis, liver disease, no renal disease, bowel prep and no morbid obesity       Endo/Other: Negative Endo/Other ROS   (+) no malignancy/cancer. (-) diabetes mellitus, hypothyroidism, hyperthyroidism, blood dyscrasia, arthritis, no electrolyte abnormalities, no malignancy/cancer               Abdominal:             Vascular: negative vascular ROS. - PVD, DVT and PE. Other Findings:           Anesthesia Plan      general     ASA 3       Induction: intravenous. MIPS: Postoperative opioids intended and Prophylactic antiemetics administered. Anesthetic plan and risks discussed with patient. Plan discussed with CRNA.                     Waldo Woodard MD   10/14/2022

## 2022-10-14 NOTE — DISCHARGE INSTRUCTIONS
To see in the office in the next 3 to 4 weeks. EGD DISCHARGE INSTRUCTIONS    Activity:  Rest today. No driving, operating machinery, or making any important decisions today. May resume normal activity tomorrow. Diet:  Following EGD eat slowly, chew food well, cut food into small pieces-Avoid greasy, spicy, \"crunchy\" foods X 48 hours. Call your Doctor if you have any of the following:  -Passing blood rectally or vomiting blood (it may be red or black). -Persistent nausea/vomiting  -Severe abdominal or chest pain not relieved with passing gas  -Fever of 100 degrees or more  -Redness or swelling at the IV site  -Severe sore throat or neck pain          Colonoscopy: What to Expect at [address] [place] [address]  After you have a colonoscopy, you will stay at the clinic for 1 to 2 hours until the medicines wear off. Then you can go home, but you will need to arrange for a ride. Your doctor will tell you when you can eat and do your other usual activities. Your doctor will talk to you about when you will need your next colonoscopy. The results of your test and your risk for colorectal cancer will help your doctor decide how often you need to be checked. After the test, you may be bloated or have gas pains. You may need to pass gas. If a biopsy was done or a polyp was removed, you may have streaks of blood in your stool (feces) for a few days. This care sheet gives you a general idea about how long it will take for you to recover. But each person recovers at a different pace. Follow the steps below to get better as quickly as possible. How can you care for yourself at home? Activity  Rest as much as you need to after you go home. You should be able to go back to your usual activities the day after the test.  Diet  Follow your doctors directions for eating. Drink plenty of fluids (unless your doctor has told you not to) to replace the fluids that were lost during the colon prep.   Do not drink alcohol. Medicines  If polyps were removed or a biopsy was done during the test, your doctor may tell you not to take aspirin or other anti-inflammatory medicines, such as ibuprofen (Advil, Motrin) and naproxen (Aleve), for a few days. Other instructions  For your safety, you should not drive or operate machinery until the medicine effects are gone and you can think clearly. Your doctor may tell you not to drive or operate machinery until the day after your test.  Do not sign legal documents or make major decisions until the medicine effects are gone and you can think clearly. The anesthesia medicine can make it hard for you to fully understand what you are agreeing to. Follow-up care is a key part of your treatment and safety. Be sure to make and go to all appointments, and call your doctor if you are having problems. It's also a good idea to know your test results and keep a list of the medicines you take. Call your Doctor if you have any of the following:             Passing blood rectally or vomiting blood (it may be red or black). Persistent nausea or vomiting. Severe abdominal or chest pain, not relieved by passing gas. Fever of 100 or more, chills or excessive sweating. Redness or swelling at the IV site. If you experience shortness of breath or severe chest pain, call 911. Where can you learn more? Go to https://[employer]. org and sign in to your [employer] account. Enter E264 in the [facility] box to learn more about Colonoscopy: What to Expect at Home.     If you do not have an account, please click on the Sign Up Now link. © 1735-2637 Healthwise, Incorporated. Care instructions adapted under license by [facility].  This care instruction is for use with your licensed healthcare professional. If you have questions about a medical condition or this instruction, always ask your healthcare professional. [employer] disclaims any warranty or liability for your use of this information. Content Version: 7.1.094596; Last Revised: February 20, 2013             High-Fiber Diet     What Is Fiber? Dietary fiber is a form of carbohydrate found in plants that cannot be digested by humans. All plants contain fiber, including fruits, vegetables, grains, and legumes. Fiber is often classified into two categories: soluble and insoluble. Soluble fiber draws water into the bowel and can help slow digestion. Examples of foods that are high in soluble fiber include oatmeal, oat bran, barley, legumes (eg, beans and peas), apples, and strawberries. Insoluble fiber speeds digestion and can add bulk to the stool. Examples of foods that are high in insoluble fiber include whole-wheat products, wheat bran, cauliflower, green beans, and potatoes. Why Follow a High-Fiber Diet? A high-fiber diet is often recommended to prevent and treat constipation , hemorrhoids , diverticulitis , and irritable bowel syndrome . Eating a high-fiber diet can also help improve your cholesterol levels, lower your risk of coronary heart disease , reduce your risk of type 2 diabetes , and lower your weight. For people with type 1 or 2 diabetes, a high-fiber diet can also help stabilize blood sugar levels. How Much Fiber Should I Eat? A high-fiber diet should contain  20-35 grams  of fiber a day. This is actually the amount recommended for the general adult population; however, most Americans eat only 15 grams of fiber per day. Digestion of Fiber   Eating a higher fiber diet than usual can take some getting used to by your body's digestive system. To avoid the side effects of sudden increases in dietary fiber (eg, gas, cramping, bloating, and diarrhea), increase fiber gradually and be sure to drink plenty of fluids every day.    Tips for Increasing Fiber Intake   Whenever possible, choose whole grains over refined grains (eg, brown rice instead of white rice, whole-wheat bread instead of white bread). Include a variety of grains in your diet, such as wheat, rye, barley, oats, quinoa, and bulgur. Eat more vegetarian-based meals. Here are some ideas: black bean burgers, eggplant lasagna, and veggie tofu stir-wiseman. Choose high-fiber snacks, such as fruits, popcorn, whole-grain crackers, and nuts. Make whole-grain cereal or whole-grain toast part of your daily breakfast regime. When eating out, whether ordering a sandwich or dinner, ask for extra vegetables. When baking, replace part of the white flour with whole-wheat flour. Whole-wheat flour is particularly easy to incorporate into a recipe. High-Fiber Diet Eating Guide   Food Category   Foods Recommended   Notes   Grains   Whole-grain breads, muffins, bagels, or дмитрий bread Rye bread Whole-wheat crackers or crisp breads Whole-grain or bran cereals Oatmeal, oat bran, or grits Wheat germ Whole-wheat pasta and brown rice   Read the ingredients list on food labels. Look for products that list \"whole\" as the first ingredient (eg, whole-wheat, whole oats). Choose cereals with at least 2 grams of fiber per serving. Vegetables   All vegetables, especially asparagus, bean sprouts, broccoli, [place] sprouts, cabbage, carrots, cauliflower, celery, corn, greens, green beans, green pepper, onions, peas, potatoes (with skin), snow peas, spinach, squash, sweet potatoes, tomatoes, zucchini   For maximum fiber intake, eat the peels of fruits and vegetablesjust be sure to wash them well first.   Fruits   All fruits, especially apples, berries, grapefruits, mangoes, nectarines, oranges, peaches, pears, dried fruits (figs, dates, prunes, raisins)   Choose raw fruits and vegetables over juice, cooked, or cannedraw fruit has more fiber. Dried fruit is also a good source of fiber. Milk   With the exception of yogurt containing inulin (a type of fiber), dairy foods provide little fiber.    Add more fiber by topping your yogurt or cottage cheese with fresh fruit, whole grain or bran cereals, nuts, or seeds. Meats and Beans   All beans and peas, especially Garbanzo beans, kidney beans, lentils, lima beans, split peas, and mc beans All nuts and seeds, especially almonds, peanuts, [place] nuts, cashews, peanut butter, walnuts, sesame and sunflower seeds All meat, poultry, fish, and eggs   Increase fiber in meat dishes by adding mc beans, kidney beans, black-eyed peas, bran, or oatmeal. If you are following a low-fat diet, use nuts and seeds only in moderation. Fats and Oils   All in moderation   Fats and oils do not provide fiber   Snacks, Sweets, and Condiments   Fruit Nuts Popcorn, whole-wheat pretzels, or trail mix made with dried fruits, nuts, and seeds Cakes, breads, and cookies made with oatmeal or whole-wheat flour   Most snack foods do not provide much fiber. Choose snacks with at least 2 grams of fiber per serving.      Last Reviewed: March 2011 Pushpa Fatima MS, MPH, RD   Updated: 3/29/2011

## 2022-10-14 NOTE — OP NOTE
COLONOSCOPY    DATE OF PROCEDURE: 10/14/2022    SURGEON: Vargas Bryan MD    ASSISTANT: None    PREOPERATIVE DIAGNOSIS: Screening colonoscopy    POSTOPERATIVE DIAGNOSIS: No lesions seen    OPERATION: Total colonoscopy     ANESTHESIA: MAC    ESTIMATED BLOOD LOSS: None    COMPLICATIONS: None     SPECIMENS:  Was Not Obtained    HISTORY: The patient is a 50y.o. year old female with history of above preop diagnosis. I recommended colonoscopy with possible biopsy or polypectomy and I explained the risk, benefits, expected outcome, and alternatives to the procedure. Risks included but are not limited to bleeding, infection, respiratory distress, hypotension, and perforation of the colon and possibility of missing a lesion. The patient understands and is in agreement. PROCEDURE:  The patient's SPO2 remained above 90% throughout the procedure. Digital rectal exam was normal.  The colonoscope was inserted through the anus into the rectum and advanced under direct vision to the cecum without difficulty. Terminal ileum was examined for approximately 2 inches. The prep was fair. Findings:  Terminal ileum: normal    Cecum/Ascending colon: normal    Transverse colon: normal    Descending/Sigmoid colon: normal    Rectum/Anus: examined in normal and retroflexed positions and was normal    Withdrawal Time was (minutes): 10          The colon was decompressed and the scope was removed. The patient tolerated the procedure without unusual events. During the procedure, the patient's blood pressure, pulse and oxygen saturation remained stable and documented. No unusual events occurred during the procedure. Patient was transferred to recovery room and will be discharged when criteria is met.      Recommendations/Plan:   F/U Biopsies  F/U In Office as instructed  Discussed with the family  High fiber diet   Precautions to avoid constipation         Electronically signed by Vargas Bryan MD  on 10/14/2022 at 11:17 AM

## 2022-10-18 ENCOUNTER — TELEPHONE (OUTPATIENT)
Dept: GASTROENTEROLOGY | Age: 52
End: 2022-10-18

## 2022-10-18 LAB — SURGICAL PATHOLOGY REPORT: NORMAL

## 2022-10-24 ENCOUNTER — TELEPHONE (OUTPATIENT)
Dept: GASTROENTEROLOGY | Age: 52
End: 2022-10-24

## 2023-01-20 ENCOUNTER — OFFICE VISIT (OUTPATIENT)
Dept: GASTROENTEROLOGY | Age: 53
End: 2023-01-20
Payer: MEDICARE

## 2023-01-20 VITALS
SYSTOLIC BLOOD PRESSURE: 105 MMHG | DIASTOLIC BLOOD PRESSURE: 65 MMHG | WEIGHT: 136 LBS | HEIGHT: 62 IN | BODY MASS INDEX: 25.03 KG/M2 | HEART RATE: 77 BPM

## 2023-01-20 DIAGNOSIS — K59.00 CONSTIPATION, UNSPECIFIED CONSTIPATION TYPE: ICD-10-CM

## 2023-01-20 DIAGNOSIS — R68.81 EARLY SATIETY: Primary | ICD-10-CM

## 2023-01-20 DIAGNOSIS — K21.9 GASTROESOPHAGEAL REFLUX DISEASE, UNSPECIFIED WHETHER ESOPHAGITIS PRESENT: ICD-10-CM

## 2023-01-20 PROCEDURE — G8484 FLU IMMUNIZE NO ADMIN: HCPCS | Performed by: INTERNAL MEDICINE

## 2023-01-20 PROCEDURE — 4004F PT TOBACCO SCREEN RCVD TLK: CPT | Performed by: INTERNAL MEDICINE

## 2023-01-20 PROCEDURE — 99214 OFFICE O/P EST MOD 30 MIN: CPT | Performed by: INTERNAL MEDICINE

## 2023-01-20 PROCEDURE — G8427 DOCREV CUR MEDS BY ELIG CLIN: HCPCS | Performed by: INTERNAL MEDICINE

## 2023-01-20 PROCEDURE — APPSS45 APP SPLIT SHARED TIME 31-45 MINUTES: Performed by: NURSE PRACTITIONER

## 2023-01-20 PROCEDURE — 3017F COLORECTAL CA SCREEN DOC REV: CPT | Performed by: INTERNAL MEDICINE

## 2023-01-20 PROCEDURE — G8420 CALC BMI NORM PARAMETERS: HCPCS | Performed by: INTERNAL MEDICINE

## 2023-01-20 RX ORDER — SIMETHICONE 80 MG
80 TABLET,CHEWABLE ORAL EVERY 6 HOURS PRN
Qty: 180 TABLET | Refills: 3 | Status: SHIPPED | OUTPATIENT
Start: 2023-01-20

## 2023-01-20 RX ORDER — DOCUSATE SODIUM 100 MG/1
100 CAPSULE, LIQUID FILLED ORAL 2 TIMES DAILY
Qty: 60 CAPSULE | Refills: 0 | Status: SHIPPED | OUTPATIENT
Start: 2023-01-20 | End: 2023-02-19

## 2023-01-20 ASSESSMENT — ENCOUNTER SYMPTOMS
ALLERGIC/IMMUNOLOGIC NEGATIVE: 1
TROUBLE SWALLOWING: 1
EYES NEGATIVE: 1
ABDOMINAL DISTENTION: 1
RESPIRATORY NEGATIVE: 1
CONSTIPATION: 1

## 2023-01-20 NOTE — PROGRESS NOTES
GI OFFICE FOLLOW UP    INTERVAL HISTORY:   No referring provider defined for this encounter. Chief Complaint   Patient presents with    Constipation     Pt here for colon/egd f/u. States she is really bloated. She has constipation, states her stool is very hard        HISTORY OF PRESENT ILLNESS:     Patient being seen for follow-up colonoscopy, EGD. EGD revealed mild duodenitis. Bx from esophagus unremarkable. Bx from stomach negative for H.Pylori. Colonoscopy had fair prep. No lesions seen. At present patient reports intermittent constipation. Takes MiraLAX as needed. Denies opioid use. No melena, hematochezia. Reports early satiety. Postprandial bloating. Weight stable. Intermittent nausea. Denies food allergies    Does endorse significant stress, anxiety. Past Medical,Family, and Social History reviewed and does contribute to the patient presenting condition. Patient's PMH/PSH,SH,PSYCH Hx, MEDs, ALLERGIES, and ROS were all reviewed and updated in the appropriate sections.  Yes      PAST MEDICAL HISTORY:  Past Medical History:   Diagnosis Date    Abdominal pain 07/25/2018    Acute cystitis without hematuria 06/24/2018    Acute laryngitis 04/06/2018    Anorexia 04/06/2017    Anxiety     Anxiety attack 06/25/2018    Asthma     Backache 01/26/2017    Cervical radiculopathy 07/25/2017    Chest congestion 04/06/2018    Chronic back pain     Chronic bronchitis ([address] 04/06/2017    Cocaine abuse ([address] 07/25/2018    Complex cyst of left ovary 07/25/2018    COPD (chronic obstructive pulmonary disease) ([facility]     Depression     Dysfunctional gallbladder 06/24/2018    Exposure to influenza 04/06/2018    Fatigue 06/15/2013    GERD (gastroesophageal reflux disease) 10/02/2017    Heart palpitations 10/28/2015    History of non anemic vitamin B12 deficiency 08/30/2017    Hoarseness, chronic 2017    Hypokalemia 2018    Insomnia 10/28/2015    Intractable vomiting with nausea     Nausea 2018    Neck pain 2017    Other constipation 2018    Ovarian cyst     Panic attacks 2017    Poor venous access     Productive cough 2017    Right upper quadrant abdominal pain 2018    RUQ abdominal pain     Severe malnutrition ([address] 2018    Thyroid disease     Tobacco use disorder 2017    Wears dentures     uppers and lowers    Weight loss 10/02/2017       Past Surgical History:   Procedure Laterality Date    BACK SURGERY      lumbar back surgery     SECTION, LOW TRANSVERSE  1999    CHOLECYSTECTOMY      COLONOSCOPY      COLONOSCOPY N/A 10/14/2022    COLONOSCOPY DIAGNOSTIC performed by Kelsey Mims MD at [facility], COLON, DIAGNOSTIC      HAND TENDON SURGERY Left     wrist    INTRAUTERINE DEVICE INSERTION      Mirena    INTRAUTERINE DEVICE REMOVAL  2016    NECK SURGERY      OTHER SURGICAL HISTORY      Per pt report polyps removed from voice box    OVARIAN CYST REMOVAL      OVARIAN CYST REMOVAL Bilateral 2005    IL ESOPHAGOGASTRODUODENOSCOPY TRANSORAL DIAGNOSTIC N/A 10/17/2017    mild esophagitis; gastritis    IL LAPAROSCOPY SURG CHOLECYSTECTOMY N/A 2018    CHOLECYSTECTOMY LAPAROSCOPIC ROBOTIC MULTI PORT performed by Danica Gamez MD at [address] [place] [address] 2018    [facility]--FRAGMENTS OF GASTRIC ANTRAL MUCOSA W/ CHRONIC REACTIVE GASTROPATHY/CHEMICAL GASTRITIS     UPPER GASTROINTESTINAL ENDOSCOPY N/A 10/14/2022    EGD BIOPSY performed by Kelsey Mims MD at [address]:    Current Outpatient Medications:     simethicone (MYLICON) 80 MG chewable tablet, Take 1 tablet by mouth every 6 hours as needed for Flatulence, Disp: 180 tablet, Rfl: 3    docusate sodium (COLACE) 100 MG capsule, Take 1 capsule by mouth 2 times daily, Disp: 60 capsule, Rfl: 0    levothyroxine (SYNTHROID) 25 MCG tablet, TAKE 1 TABLET BY MOUTH AT THE SAME TIME EVERY DAY ON AN EMPTY STOMACH.  AVOID DRINKING OR EATING 30-60 MINUTES AFTER TAKING MEDICATION, Disp: 30 tablet, Rfl: 3    vitamin D (ERGOCALCIFEROL) 1.25 MG (58266 UT) CAPS capsule, TAKE 1 CAPSULE BY MOUTH 1 TIME A WEEK, Disp: 12 capsule, Rfl: 2    albuterol sulfate HFA (PROVENTIL;VENTOLIN;PROAIR) 108 (90 Base) MCG/ACT inhaler, INHALE 2 PUFFS INTO THE LUNGS EVERY 6 HOURS AS NEEDED FOR WHEEZING OR SHORTNESS OF BREATH, Disp: 18 g, Rfl: 5    acetaminophen (TYLENOL) 500 MG tablet, Take 500 mg by mouth every 6 hours as needed, Disp: , Rfl:     polyethylene glycol (GLYCOLAX) 17 GM/SCOOP powder, Follow instructions given by provider, Disp: 238 g, Rfl: 0    bisacodyl 5 MG EC tablet, Take 1 tablet by mouth daily as needed for Constipation, Disp: 4 tablet, Rfl: 0    mometasone-formoterol (DULERA) 100-5 MCG/ACT inhaler, Inhale 1 puff into the lungs 2 times daily, Disp: 1 each, Rfl: 3    magnesium citrate solution, Take 296 mLs by mouth once for 1 dose Follow instructions given by provider office, Disp: 1 mL, Rfl: 0    ALLERGIES:   Allergies   Allergen Reactions    Augmentin [Amoxicillin-Pot Clavulanate]     Codeine        FAMILY HISTORY:       Problem Relation Age of Onset    High Blood Pressure Mother     Diabetes Mother     Stroke Maternal Grandmother 79    Heart Disease Maternal Uncle     Heart Disease Paternal Aunt          SOCIAL HISTORY:   Social History     Socioeconomic History    Marital status: Legally      Spouse name: Not on file    Number of children: 1    Years of education: 9th grade    Highest education level: Not on file   Occupational History    Occupation: cleaning   Tobacco Use    Smoking status: Every Day     Packs/day: 2.00     Years: 30.00     Pack years: 60.00     Types: Cigarettes     Start date: 1985     Last attempt to quit: 2018     Years since quittin.1    Smokeless tobacco: Never   Vaping Use    Vaping Use: Former   Substance and Sexual Activity    Alcohol use: Yes     Comment: once or twice a month, 3-4 drinks    Drug use: No    Sexual activity: Not Currently   Other Topics Concern    Not on file   Social History Narrative    Lives with son and his boyfriend     Social Determinants of Health     Financial Resource Strain: Low Risk     Difficulty of Paying Living Expenses: Not hard at all   Food Insecurity: No Food Insecurity    Worried About Running Out of Food in the Last Year: Never true    [address] in the Last Year: Never true   Transportation Needs: No Transportation Needs    Lack of Transportation (Medical): No    Lack of Transportation (Non-Medical): No   Physical Activity: Not on file   Stress: Not on file   Social Connections: Not on file   Intimate Partner Violence: Not on file   Housing Stability: Not on file         REVIEW OF SYSTEMS:         Review of Systems   Constitutional:  Positive for appetite change and fatigue. HENT:  Positive for trouble swallowing (sometimes). Eyes: Negative. Respiratory: Negative. Cardiovascular: Negative. Gastrointestinal:  Positive for abdominal distention and constipation. Endocrine: Negative. Genitourinary: Negative. Musculoskeletal: Negative. Skin: Negative. Allergic/Immunologic: Negative. Neurological: Negative. Hematological: Negative. Psychiatric/Behavioral: Negative. PHYSICAL EXAMINATION:     Vital signs reviewed per the nursing documentation. /65   Pulse 77   Ht 5' 2\" (1.575 m)   Wt 136 lb (61.7 kg)   LMP 03/10/2017   BMI 24.87 kg/m²   Body mass index is 24.87 kg/m². Physical Exam  Constitutional:       Appearance: Normal appearance. Eyes:      General: No scleral icterus. Pupils: Pupils are equal, round, and reactive to light. Cardiovascular:      Rate and Rhythm: Normal rate and regular rhythm. Heart sounds: Normal heart sounds.    Pulmonary:      Effort: Pulmonary effort is normal.      Breath sounds: Normal breath sounds. Abdominal:      General: Bowel sounds are normal. There is no distension. Palpations: Abdomen is soft. There is no mass. Tenderness: There is no abdominal tenderness. There is no guarding. Skin:     General: Skin is warm and dry. Coloration: Skin is not jaundiced. Neurological:      Mental Status: She is alert and oriented to person, place, and time. Mental status is at baseline. LABORATORY DATA: Reviewed  Lab Results   Component Value Date    WBC 7.6 04/08/2022    HGB 14.2 04/08/2022    HCT 42.6 04/08/2022    MCV 92.1 04/08/2022     04/08/2022     04/08/2022    K 4.6 04/08/2022     (H) 04/08/2022    CO2 23 04/08/2022    BUN 15 04/08/2022    CREATININE 0.70 04/08/2022    LABALBU 3.9 04/08/2022    BILITOT 0.34 04/08/2022    ALKPHOS 76 04/08/2022    AST 21 04/08/2022    ALT 19 04/08/2022         Lab Results   Component Value Date    RBC 4.63 04/08/2022    HGB 14.2 04/08/2022    MCV 92.1 04/08/2022    MCH 30.7 04/08/2022    MCHC 33.4 04/08/2022    RDW 12.4 04/08/2022    MPV 7.7 04/08/2022    BASOPCT 1 04/08/2022    LYMPHSABS 2.10 04/08/2022    MONOSABS 0.50 04/08/2022    NEUTROABS 4.80 04/08/2022    EOSABS 0.10 04/08/2022    BASOSABS 0.10 04/08/2022         DIAGNOSTIC TESTING:     No results found. Assessment  1. Early satiety    2. Constipation, unspecified constipation type    3. Gastroesophageal reflux disease, unspecified whether esophagitis present        Plan  EGD and colonoscopy reviewed with patient. No lesions seen. Fair prep. Repeat colonoscopy next 3-5 years. EGD revealed mild duodenitis. On Pepcid. Reports early satiety, nausea. Check GES, solid phase. Chronic constipation. Add colace. High fiber diet, increase fluids. RTO next 8-12 weeks    Thank you for allowing me to participate in the care of Ms. Paris Moctezuma. For any further questions please do not hesitate to contact me.     I have reviewed and agree with the ROS entered by the MA/LPN. Note is dictated utilizing voice recognition software. Unfortunately this leads to occasional typographical errors.  Please contact our office if you have any questions    Above discussed with APRN explained regarding management plan    Timi Badillo MD,FACP, [facility]  Board Certified in Gastroenterology and [address] [place] [address] Gastroenterology  Office #: [phone]

## 2023-03-15 RX ORDER — DOCUSATE SODIUM 100 MG/1
CAPSULE, LIQUID FILLED ORAL
Qty: 60 CAPSULE | Refills: 0 | Status: SHIPPED | OUTPATIENT
Start: 2023-03-15

## 2023-03-17 ENCOUNTER — TELEPHONE (OUTPATIENT)
Dept: GASTROENTEROLOGY | Age: 53
End: 2023-03-17

## 2023-05-01 RX ORDER — DOCUSATE SODIUM 100 MG/1
CAPSULE, LIQUID FILLED ORAL
Qty: 60 CAPSULE | Refills: 5 | Status: SHIPPED | OUTPATIENT
Start: 2023-05-01

## 2023-08-05 ENCOUNTER — HOSPITAL ENCOUNTER (EMERGENCY)
Age: 53
Discharge: HOME | End: 2023-08-05
Attending: EMERGENCY MEDICINE
Payer: MEDICAID

## 2023-08-05 ENCOUNTER — APPOINTMENT (OUTPATIENT)
Dept: GENERAL RADIOLOGY | Age: 53
End: 2023-08-05
Payer: MEDICAID

## 2023-08-05 VITALS
BODY MASS INDEX: 22.66 KG/M2 | RESPIRATION RATE: 24 BRPM | HEIGHT: 65 IN | SYSTOLIC BLOOD PRESSURE: 116 MMHG | TEMPERATURE: 97 F | HEART RATE: 91 BPM | WEIGHT: 136 LBS | OXYGEN SATURATION: 97 % | DIASTOLIC BLOOD PRESSURE: 86 MMHG

## 2023-08-05 DIAGNOSIS — T50.901A ACCIDENTAL DRUG OVERDOSE, INITIAL ENCOUNTER: Primary | ICD-10-CM

## 2023-08-05 LAB
ANION GAP SERPL CALCULATED.3IONS-SCNC: 13 MMOL/L (ref 9–17)
BASOPHILS # BLD: 0.1 K/UL (ref 0–0.2)
BASOPHILS NFR BLD: 1 % (ref 0–2)
BUN SERPL-MCNC: 14 MG/DL (ref 6–20)
CALCIUM SERPL-MCNC: 9.6 MG/DL (ref 8.6–10.4)
CHLORIDE SERPL-SCNC: 103 MMOL/L (ref 98–107)
CO2 SERPL-SCNC: 25 MMOL/L (ref 20–31)
CREAT SERPL-MCNC: 1.1 MG/DL (ref 0.5–0.9)
EOSINOPHIL # BLD: 0.1 K/UL (ref 0–0.4)
EOSINOPHILS RELATIVE PERCENT: 1 % (ref 0–4)
ERYTHROCYTE [DISTWIDTH] IN BLOOD BY AUTOMATED COUNT: 12.8 % (ref 11.5–14.9)
GFR SERPL CREATININE-BSD FRML MDRD: >60 ML/MIN/1.73M2
GLUCOSE SERPL-MCNC: 209 MG/DL (ref 70–99)
HCT VFR BLD AUTO: 41.4 % (ref 36–46)
HGB BLD-MCNC: 13.7 G/DL (ref 12–16)
LYMPHOCYTES NFR BLD: 2.6 K/UL (ref 1–4.8)
LYMPHOCYTES RELATIVE PERCENT: 24 % (ref 24–44)
MCH RBC QN AUTO: 30.6 PG (ref 26–34)
MCHC RBC AUTO-ENTMCNC: 33.1 G/DL (ref 31–37)
MCV RBC AUTO: 92.5 FL (ref 80–100)
MONOCYTES NFR BLD: 0.4 K/UL (ref 0.1–1.3)
MONOCYTES NFR BLD: 4 % (ref 1–7)
NEUTROPHILS NFR BLD: 70 % (ref 36–66)
NEUTS SEG NFR BLD: 7.9 K/UL (ref 1.3–9.1)
PLATELET # BLD AUTO: 352 K/UL (ref 150–450)
PMV BLD AUTO: 8.3 FL (ref 6–12)
POTASSIUM SERPL-SCNC: 3.9 MMOL/L (ref 3.7–5.3)
RBC # BLD AUTO: 4.47 M/UL (ref 4–5.2)
SODIUM SERPL-SCNC: 141 MMOL/L (ref 135–144)
WBC OTHER # BLD: 11.1 K/UL (ref 3.5–11)

## 2023-08-05 PROCEDURE — 93005 ELECTROCARDIOGRAM TRACING: CPT

## 2023-08-05 PROCEDURE — 85025 COMPLETE CBC W/AUTO DIFF WBC: CPT

## 2023-08-05 PROCEDURE — 36415 COLL VENOUS BLD VENIPUNCTURE: CPT

## 2023-08-05 PROCEDURE — 99285 EMERGENCY DEPT VISIT HI MDM: CPT

## 2023-08-05 PROCEDURE — 71045 X-RAY EXAM CHEST 1 VIEW: CPT

## 2023-08-05 PROCEDURE — 2580000003 HC RX 258

## 2023-08-05 PROCEDURE — 80048 BASIC METABOLIC PNL TOTAL CA: CPT

## 2023-08-05 RX ORDER — 0.9 % SODIUM CHLORIDE 0.9 %
1000 INTRAVENOUS SOLUTION INTRAVENOUS ONCE
Status: COMPLETED | OUTPATIENT
Start: 2023-08-05 | End: 2023-08-05

## 2023-08-05 RX ADMIN — SODIUM CHLORIDE 1000 ML: 9 INJECTION, SOLUTION INTRAVENOUS at 18:17

## 2023-08-05 ASSESSMENT — ENCOUNTER SYMPTOMS
ABDOMINAL PAIN: 0
CONSTIPATION: 0
CHEST TIGHTNESS: 0
DIARRHEA: 0
ABDOMINAL DISTENTION: 0
CHOKING: 0
SHORTNESS OF BREATH: 0
NAUSEA: 0
VOMITING: 0
COUGH: 0

## 2023-08-05 ASSESSMENT — PAIN - FUNCTIONAL ASSESSMENT: PAIN_FUNCTIONAL_ASSESSMENT: NONE - DENIES PAIN

## 2023-08-06 NOTE — DISCHARGE INSTRUCTIONS
Seen in the emergency department following drug overdose. We monitored you in the emergency department and believe you are stable for discharge. Please follow-up with your primary care provider within a few days of discharge. Stop using drugs. You can use diphenhydramine (Benadryl) for any feeling of anxiousness. PLEASE RETURN TO THE EMERGENCY DEPARTMENT IMMEDIATELY for worsening symptoms, or if you develop any concerning symptoms such as: high fever not relieved by acetaminophen (Tylenol) and/or ibuprofen (Motrin / Advil), chills, shortness of breath, chest pain, feeling of your heart fluttering or racing, persistent nausea and/or vomiting, vomiting up blood, blood in your stool, numbness, loss of consciousness, weakness or tingling in the arms or legs or change in color of the extremities, changes in mental status, persistent headache, blurry vision, loss of bladder / bowel control, unable to follow up with your physician, or other any other care or concern.

## 2023-08-06 NOTE — ED NOTES
Pt discharged with 'At home narcan' kit. Pt and patient family member was taught how to use Narcan kit when suspected overdose. Pt and family verbalized understanding on how to use nasal spray and the importance of calling 911 when suspected.       Kaitlin Escoto RN  08/05/23 2902

## 2023-08-07 LAB
EKG ATRIAL RATE: 114 BPM
EKG P AXIS: 65 DEGREES
EKG P-R INTERVAL: 100 MS
EKG Q-T INTERVAL: 332 MS
EKG QRS DURATION: 76 MS
EKG QTC CALCULATION (BAZETT): 457 MS
EKG R AXIS: 67 DEGREES
EKG T AXIS: 30 DEGREES
EKG VENTRICULAR RATE: 114 BPM

## 2023-11-28 ENCOUNTER — OFFICE VISIT (OUTPATIENT)
Dept: FAMILY MEDICINE CLINIC | Age: 53
End: 2023-11-28
Payer: MEDICAID

## 2023-11-28 VITALS
WEIGHT: 121.9 LBS | OXYGEN SATURATION: 96 % | SYSTOLIC BLOOD PRESSURE: 132 MMHG | HEART RATE: 83 BPM | BODY MASS INDEX: 22.43 KG/M2 | TEMPERATURE: 97.6 F | DIASTOLIC BLOOD PRESSURE: 76 MMHG | HEIGHT: 62 IN

## 2023-11-28 DIAGNOSIS — Z13.220 SCREENING FOR HYPERLIPIDEMIA: ICD-10-CM

## 2023-11-28 DIAGNOSIS — K59.00 CONSTIPATION, UNSPECIFIED CONSTIPATION TYPE: ICD-10-CM

## 2023-11-28 DIAGNOSIS — Z23 NEED FOR TDAP VACCINATION: ICD-10-CM

## 2023-11-28 DIAGNOSIS — Z76.89 ENCOUNTER TO ESTABLISH CARE: ICD-10-CM

## 2023-11-28 DIAGNOSIS — M79.602 LEFT ARM PAIN: ICD-10-CM

## 2023-11-28 DIAGNOSIS — J43.9 PULMONARY EMPHYSEMA, UNSPECIFIED EMPHYSEMA TYPE (HCC): ICD-10-CM

## 2023-11-28 DIAGNOSIS — Z13.1 SCREENING FOR DIABETES MELLITUS: ICD-10-CM

## 2023-11-28 DIAGNOSIS — Z11.59 SCREENING FOR VIRAL DISEASE: ICD-10-CM

## 2023-11-28 DIAGNOSIS — Z12.4 PAP SMEAR FOR CERVICAL CANCER SCREENING: ICD-10-CM

## 2023-11-28 DIAGNOSIS — Z87.891 PERSONAL HISTORY OF SMOKING: ICD-10-CM

## 2023-11-28 DIAGNOSIS — J44.9 CHRONIC OBSTRUCTIVE PULMONARY DISEASE, UNSPECIFIED COPD TYPE (HCC): ICD-10-CM

## 2023-11-28 DIAGNOSIS — R13.10 DYSPHAGIA, UNSPECIFIED TYPE: ICD-10-CM

## 2023-11-28 DIAGNOSIS — Z12.31 ENCOUNTER FOR SCREENING MAMMOGRAM FOR MALIGNANT NEOPLASM OF BREAST: ICD-10-CM

## 2023-11-28 DIAGNOSIS — F32.A ANXIETY AND DEPRESSION: Primary | ICD-10-CM

## 2023-11-28 DIAGNOSIS — R49.9 CHANGE IN VOICE: ICD-10-CM

## 2023-11-28 DIAGNOSIS — R53.83 OTHER FATIGUE: ICD-10-CM

## 2023-11-28 DIAGNOSIS — F41.9 ANXIETY AND DEPRESSION: Primary | ICD-10-CM

## 2023-11-28 PROCEDURE — 99215 OFFICE O/P EST HI 40 MIN: CPT | Performed by: NURSE PRACTITIONER

## 2023-11-28 RX ORDER — BISACODYL 5 MG/1
5 TABLET, DELAYED RELEASE ORAL DAILY PRN
Qty: 4 TABLET | Refills: 0 | Status: SHIPPED | OUTPATIENT
Start: 2023-11-28

## 2023-11-28 RX ORDER — ALBUTEROL SULFATE 90 UG/1
2 AEROSOL, METERED RESPIRATORY (INHALATION) EVERY 6 HOURS PRN
Qty: 18 G | Refills: 5 | Status: SHIPPED | OUTPATIENT
Start: 2023-11-28

## 2023-11-28 RX ORDER — POLYETHYLENE GLYCOL 3350 17 G/17G
POWDER, FOR SOLUTION ORAL
Qty: 238 G | Refills: 0 | Status: SHIPPED | OUTPATIENT
Start: 2023-11-28

## 2023-11-28 RX ORDER — SERTRALINE HYDROCHLORIDE 25 MG/1
25 TABLET, FILM COATED ORAL DAILY
Qty: 30 TABLET | Refills: 3 | Status: SHIPPED | OUTPATIENT
Start: 2023-11-28

## 2023-11-28 SDOH — ECONOMIC STABILITY: FOOD INSECURITY: WITHIN THE PAST 12 MONTHS, THE FOOD YOU BOUGHT JUST DIDN'T LAST AND YOU DIDN'T HAVE MONEY TO GET MORE.: NEVER TRUE

## 2023-11-28 SDOH — ECONOMIC STABILITY: HOUSING INSECURITY
IN THE LAST 12 MONTHS, WAS THERE A TIME WHEN YOU DID NOT HAVE A STEADY PLACE TO SLEEP OR SLEPT IN A SHELTER (INCLUDING NOW)?: NO

## 2023-11-28 SDOH — ECONOMIC STABILITY: FOOD INSECURITY: WITHIN THE PAST 12 MONTHS, YOU WORRIED THAT YOUR FOOD WOULD RUN OUT BEFORE YOU GOT MONEY TO BUY MORE.: NEVER TRUE

## 2023-11-28 SDOH — ECONOMIC STABILITY: INCOME INSECURITY: HOW HARD IS IT FOR YOU TO PAY FOR THE VERY BASICS LIKE FOOD, HOUSING, MEDICAL CARE, AND HEATING?: NOT HARD AT ALL

## 2023-11-28 ASSESSMENT — PATIENT HEALTH QUESTIONNAIRE - PHQ9
SUM OF ALL RESPONSES TO PHQ QUESTIONS 1-9: 24
5. POOR APPETITE OR OVEREATING: 3
3. TROUBLE FALLING OR STAYING ASLEEP: 3
2. FEELING DOWN, DEPRESSED OR HOPELESS: 3
6. FEELING BAD ABOUT YOURSELF - OR THAT YOU ARE A FAILURE OR HAVE LET YOURSELF OR YOUR FAMILY DOWN: 3
10. IF YOU CHECKED OFF ANY PROBLEMS, HOW DIFFICULT HAVE THESE PROBLEMS MADE IT FOR YOU TO DO YOUR WORK, TAKE CARE OF THINGS AT HOME, OR GET ALONG WITH OTHER PEOPLE: 2
SUM OF ALL RESPONSES TO PHQ QUESTIONS 1-9: 24
1. LITTLE INTEREST OR PLEASURE IN DOING THINGS: 3
SUM OF ALL RESPONSES TO PHQ QUESTIONS 1-9: 24
SUM OF ALL RESPONSES TO PHQ9 QUESTIONS 1 & 2: 6
SUM OF ALL RESPONSES TO PHQ QUESTIONS 1-9: 24
4. FEELING TIRED OR HAVING LITTLE ENERGY: 3
8. MOVING OR SPEAKING SO SLOWLY THAT OTHER PEOPLE COULD HAVE NOTICED. OR THE OPPOSITE, BEING SO FIGETY OR RESTLESS THAT YOU HAVE BEEN MOVING AROUND A LOT MORE THAN USUAL: 3
9. THOUGHTS THAT YOU WOULD BE BETTER OFF DEAD, OR OF HURTING YOURSELF: 0
7. TROUBLE CONCENTRATING ON THINGS, SUCH AS READING THE NEWSPAPER OR WATCHING TELEVISION: 3

## 2023-11-28 ASSESSMENT — ENCOUNTER SYMPTOMS
WHEEZING: 0
DIARRHEA: 0
CHEST TIGHTNESS: 0
NAUSEA: 0
ABDOMINAL PAIN: 0
VOICE CHANGE: 1
COUGH: 0
ABDOMINAL DISTENTION: 0
SHORTNESS OF BREATH: 0
VOMITING: 0
BACK PAIN: 0
CONSTIPATION: 0
BLOOD IN STOOL: 0
TROUBLE SWALLOWING: 1

## 2023-11-28 ASSESSMENT — ANXIETY QUESTIONNAIRES
7. FEELING AFRAID AS IF SOMETHING AWFUL MIGHT HAPPEN: 2
3. WORRYING TOO MUCH ABOUT DIFFERENT THINGS: 3
2. NOT BEING ABLE TO STOP OR CONTROL WORRYING: 3
1. FEELING NERVOUS, ANXIOUS, OR ON EDGE: 3
4. TROUBLE RELAXING: 3
6. BECOMING EASILY ANNOYED OR IRRITABLE: 3
5. BEING SO RESTLESS THAT IT IS HARD TO SIT STILL: 3
IF YOU CHECKED OFF ANY PROBLEMS ON THIS QUESTIONNAIRE, HOW DIFFICULT HAVE THESE PROBLEMS MADE IT FOR YOU TO DO YOUR WORK, TAKE CARE OF THINGS AT HOME, OR GET ALONG WITH OTHER PEOPLE: VERY DIFFICULT
GAD7 TOTAL SCORE: 20

## 2023-11-28 ASSESSMENT — COLUMBIA-SUICIDE SEVERITY RATING SCALE - C-SSRS
BASED ON RESPONSES TO C-SSRS QS 1-6, WHAT IS THE PATIENT'S OVERALL RISK RATING FOR SUICIDE: LOW RISK
6. HAVE YOU EVER DONE ANYTHING, STARTED TO DO ANYTHING, OR PREPARED TO DO ANYTHING TO END YOUR LIFE?: NO
4. HAVE YOU HAD THESE THOUGHTS AND HAD SOME INTENTION OF ACTING ON THEM?: NO
1. WITHIN THE PAST MONTH, HAVE YOU WISHED YOU WERE DEAD OR WISHED YOU COULD GO TO SLEEP AND NOT WAKE UP?: YES
2. HAVE YOU ACTUALLY HAD ANY THOUGHTS OF KILLING YOURSELF?: NO
7. DID THIS OCCUR IN THE LAST THREE MONTHS: NO
3. HAVE YOU BEEN THINKING ABOUT HOW YOU MIGHT KILL YOURSELF?: NO
5. HAVE YOU STARTED TO WORK OUT OR WORKED OUT THE DETAILS OF HOW TO KILL YOURSELF? DO YOU INTEND TO CARRY OUT THIS PLAN?: NO

## 2023-11-28 NOTE — PROGRESS NOTES
Visit Information    Have you changed or started any medications since your last visit including any over-the-counter medicines, vitamins, or herbal medicines? no   Have you stopped taking any of your medications? Is so, why? -  no  Are you having any side effects from any of your medications? - no    Have you seen any other physician or provider since your last visit?  no   Have you had any other diagnostic tests since your last visit?  no   Have you been seen in the emergency room and/or had an admission in a hospital since we last saw you?  no   Have you had your routine dental cleaning in the past 6 months?  no     Do you have an active MyChart account? If no, what is the barrier?   Yes    Patient Care Team:  LISANDRA Caballero CNP as PCP - General (Nurse Practitioner)  Amna Jean MD as Consulting Physician (Pulmonology)  Papa Fournier MD as Consulting Physician (Gastroenterology)  Amelia Rangel MD as Consulting Physician (Gastroenterology)    Medical History Review  Past Medical, Family, and Social History reviewed and does contribute to the patient presenting condition    Health Maintenance   Topic Date Due    Hepatitis B vaccine (1 of 3 - 3-dose series) Never done    Pneumococcal 0-64 years Vaccine (1 - PCV) Never done    DTaP/Tdap/Td vaccine (1 - Tdap) Never done    Cervical cancer screen  04/10/2020    Breast cancer screen  01/28/2022    Low dose CT lung screening &/or counseling  Never done    Depression Monitoring  03/23/2023    Flu vaccine (1) Never done    COVID-19 Vaccine (3 - 2023-24 season) 09/01/2023    Shingles vaccine (1 of 2) 03/23/2024 (Originally 1/28/2022)    Lipids  04/08/2027    Colorectal Cancer Screen  10/14/2032    Hepatitis C screen  Completed    HIV screen  Completed    Hepatitis A vaccine  Aged Out    Hib vaccine  Aged Out    Meningococcal (ACWY) vaccine  Aged Out

## 2023-11-28 NOTE — PROGRESS NOTES
Jose Garrison (:  1970) is a 48 y.o. female,New patient, here for evaluation of the following chief complaint(s): Establish Care, Heartburn (EVERYDAY/NOTHING HELPING OTC), Anxiety, Choking (TROUBLE SWALLOWING THE LAST 6 MONTHS/SX IN THE PAST ON VOICE BOX), and Numbness (LEFT ARM)      ASSESSMENT/PLAN:    Elizabeth received counseling on the following healthy behaviors: nutrition, exercise, and medication adherence  Reviewed prior labs and health maintenance  Discussed use, benefit, and side effects of prescribed medications. Barriers to medication compliance addressed. Patient given educational materials - see patient instructions  All patient questions answered. Patient voiced understanding. The patient's past medical,surgical, social, and family history as well as her current medications and allergies were reviewed as documented in today's encounter. Medications, labs, diagnostic studies, consultations and follow-up as documented in this encounter. Elizabeth was seen today for establish care, heartburn, anxiety, choking and numbness. Diagnoses and all orders for this visit:    Anxiety and depression  -Uncontrolled at this time  -Has a lot of stress at home, caring for her mother with dementia, her support system includes her   -No thoughts of self-harm or harming others  -Plan for behavioral health referral and medication  -Discussed potential side effects of the medication  -When to reach out to let us know, she states understanding and agrees  -     [facility])  -     sertraline (ZOLOFT) 25 MG tablet; Take 1 tablet by mouth daily    Encounter for screening mammogram for malignant neoplasm of breast  -     ASHLEE DIGITAL SCREENING AUGMENTED BILATERAL; Future    Personal history of smoking  -     CT LUNG SCREENING; Future    Screening for viral disease  -     Hepatitis B Surface Antibody;  Future    Other fatigue  -Worsening as of recently  -Patient

## 2023-12-27 ENCOUNTER — TELEPHONE (OUTPATIENT)
Dept: GASTROENTEROLOGY | Age: 53
End: 2023-12-27

## 2023-12-27 NOTE — TELEPHONE ENCOUNTER
Writer called and left message for patient to call if she would like a follow up appt for dr Lauri Guido. Sae Woods

## 2024-02-20 ENCOUNTER — TELEPHONE (OUTPATIENT)
Dept: FAMILY MEDICINE CLINIC | Age: 54
End: 2024-02-20

## 2024-02-20 NOTE — TELEPHONE ENCOUNTER
Patient is overdue for (HM/CARE CAPS listed below).  Patient was reached by phone (Patient did not answer so LVM, Also sent via My-chart message to patient.) to remind how important it is to schedule a screening/appointment to follow up for testing to be completed.     I did mail out standing orders to patient home address with OVERDUE reminder letter.     Health Maintenance Due   Topic Date Due    Hepatitis B vaccine (1 of 3 - 3-dose series) Never done    DTaP/Tdap/Td vaccine (1 - Tdap) Never done    Cervical cancer screen  04/10/2020    Breast cancer screen  01/28/2022    Low dose CT lung screening &/or counseling  Never done

## 2024-12-23 ENCOUNTER — HOSPITAL ENCOUNTER (EMERGENCY)
Age: 54
Discharge: HOME | End: 2024-12-23
Attending: EMERGENCY MEDICINE
Payer: MEDICAID

## 2024-12-23 VITALS
OXYGEN SATURATION: 98 % | DIASTOLIC BLOOD PRESSURE: 77 MMHG | SYSTOLIC BLOOD PRESSURE: 130 MMHG | RESPIRATION RATE: 16 BRPM | HEART RATE: 98 BPM

## 2024-12-23 DIAGNOSIS — T40.604A NARCOTIC OVERDOSE, UNDETERMINED INTENT, INITIAL ENCOUNTER (HCC): Primary | ICD-10-CM

## 2024-12-23 PROCEDURE — 99284 EMERGENCY DEPT VISIT MOD MDM: CPT

## 2024-12-23 PROCEDURE — 6360000002 HC RX W HCPCS

## 2024-12-23 PROCEDURE — 96374 THER/PROPH/DIAG INJ IV PUSH: CPT

## 2024-12-23 RX ORDER — NALOXONE HYDROCHLORIDE 0.4 MG/ML
INJECTION, SOLUTION INTRAMUSCULAR; INTRAVENOUS; SUBCUTANEOUS
Status: COMPLETED
Start: 2024-12-23 | End: 2024-12-23

## 2024-12-23 RX ORDER — NALOXONE HYDROCHLORIDE 1 MG/ML
INJECTION INTRAMUSCULAR; INTRAVENOUS; SUBCUTANEOUS
Status: DISCONTINUED
Start: 2024-12-23 | End: 2024-12-23 | Stop reason: HOSPADM

## 2024-12-23 RX ORDER — NALOXONE HYDROCHLORIDE 0.4 MG/ML
0.4 INJECTION, SOLUTION INTRAMUSCULAR; INTRAVENOUS; SUBCUTANEOUS ONCE
Status: COMPLETED | OUTPATIENT
Start: 2024-12-23 | End: 2024-12-23

## 2024-12-23 RX ADMIN — NALOXONE HYDROCHLORIDE 0.4 MG: 0.4 INJECTION, SOLUTION INTRAMUSCULAR; INTRAVENOUS; SUBCUTANEOUS at 21:24

## 2024-12-24 NOTE — DISCHARGE INSTRUCTIONS
You were seen in the ER today for evaluation following overdose.  You were given Narcan.    Please proceed to rehab as discussed.    Return if new or worsening symptoms or any other concerns.

## 2024-12-24 NOTE — ED TRIAGE NOTES
Pt presents to the ED through triage after reporting use of fentanyl around 20 minutes ago. Pt states she often uses often. Pt denies any other drug use or alcohol use. Pt appears anxious. PT able to follow command but selectively answering questions. Pt hypoxic on room air. PT placed on 4L O2 via NC. Pt placed on full cardiac monitor.

## 2024-12-24 NOTE — ED PROVIDER NOTES
[facility]     Emergency Department     Faculty Attestation    I performed a history and physical examination of the patient and discussed management with the resident. I reviewed the resident’s note and agree with the documented findings and plan of care. Any areas of disagreement are noted on the chart. I was personally present for the key portions of any procedures. I have documented in the chart those procedures where I was not present during the key portions. I have reviewed the emergency nurses triage note. I agree with the chief complaint, past medical history, past surgical history, allergies, medications, social and family history as documented unless otherwise noted below.        For Physician Assistant/ Nurse Practitioner cases/documentation I have personally evaluated this patient and have completed at least one if not all key elements of the E/M (history, physical exam, and MDM). Additional findings are as noted.  I have personally seen and evaluated the patient.  I find the patient's history and physical exam are consistent with the NP/PA documentation.  I agree with the care provided, treatment rendered, disposition and follow-up plan.    Patient was given Narcan here and responds appropriately awake now with saturations remaining at 100% on room air after initial hypoxic episode here patient is mentating clearly and with decisional capacity is wishing to be to leave at this time.      Critical Care     Marco Chen M.D.  Attending Emergency  Physician           Marco Chen MD  12/23/24 8211

## 2024-12-24 NOTE — ED PROVIDER NOTES
[facility] EMERGENCY DEPARTMENT  Emergency Department Encounter  Emergency Medicine Resident     Pt Name:Elizabeth Alex  MRN: [id number]  Birthdate 1970  Date of evaluation: 12/23/24  PCP:  Dario Michelle APRN - CNP  Note Started: 9:26 PM EST      CHIEF COMPLAINT       Chief Complaint   Patient presents with    Drug Overdose       HISTORY OF PRESENT ILLNESS  (Location/Symptom, Timing/Onset, Context/Setting, Quality, Duration, Modifying Factors, Severity.)      Elizabeth Alex is a 54 y.o. female brought in by her  for evaluation of drug overdose.   reports a history of heroin and fentanyl use.  He was driving her to rehab today when she became tired and in and out of consciousness in the car.  He thinks she may have gone out and taken some drugs before they began their journey.  He states she has otherwise been acting like herself, no other complaints today.    PAST MEDICAL / SURGICAL / SOCIAL / FAMILY HISTORY      has a past medical history of Abdominal pain, Acute cystitis without hematuria, Acute laryngitis, Anorexia, Anxiety, Anxiety attack, Asthma, Backache, Bipolar disorder (HCC), Cervical radiculopathy, Chest congestion, Chronic back pain, Chronic bronchitis ([facility]), Cocaine abuse (HCC), Complex cyst of left ovary, COPD (chronic obstructive pulmonary disease) ([facility]), Depression, Dysfunctional gallbladder, Exposure to influenza, Fatigue, GERD (gastroesophageal reflux disease), Heart palpitations, History of non anemic vitamin B12 deficiency, Hoarseness, chronic, Hypokalemia, Insomnia, Intractable vomiting with nausea, Nausea, Neck pain, Other constipation, Ovarian cyst, Panic attacks, Poor venous access, Productive cough, Restless legs syndrome, Right upper quadrant abdominal pain, RUQ abdominal pain, Severe malnutrition (HCC), Thyroid disease, Tobacco use disorder, Wears dentures, and Weight loss.     has a past surgical history that includes ovarian cyst removal; ovarian cyst removal

## 2024-12-24 NOTE — ED NOTES
Writer met with patient at bedside regarding drug overdose today.  Patient states that she currently has a headache but otherwise is ok. Patient reports she was on her way to [facility] where her detox bed is.  She states that she decided to \"do a little before I went in.\"  Patient denies questions related to detox and denies need for other resources at this time.  Social work will remain available to assist if needed.

## 2024-12-24 NOTE — ED NOTES
Was not able to obtain temperature, okay per Dr. Gusman. Pt requested to be discharged with spouse present.

## 2024-12-31 LAB
EKG ATRIAL RATE: 106 BPM
EKG P AXIS: 71 DEGREES
EKG P-R INTERVAL: 122 MS
EKG Q-T INTERVAL: 332 MS
EKG QRS DURATION: 86 MS
EKG QTC CALCULATION (BAZETT): 441 MS
EKG R AXIS: 86 DEGREES
EKG T AXIS: 44 DEGREES
EKG VENTRICULAR RATE: 106 BPM

## 2025-03-04 ENCOUNTER — APPOINTMENT (OUTPATIENT)
Dept: GENERAL RADIOLOGY | Age: 55
End: 2025-03-04
Payer: MEDICAID

## 2025-03-04 ENCOUNTER — HOSPITAL ENCOUNTER (EMERGENCY)
Age: 55
Discharge: HOME | End: 2025-03-04
Attending: EMERGENCY MEDICINE
Payer: MEDICAID

## 2025-03-04 VITALS
HEART RATE: 101 BPM | OXYGEN SATURATION: 96 % | SYSTOLIC BLOOD PRESSURE: 123 MMHG | RESPIRATION RATE: 15 BRPM | DIASTOLIC BLOOD PRESSURE: 83 MMHG | TEMPERATURE: 98.2 F

## 2025-03-04 DIAGNOSIS — M54.30 BACK PAIN WITH SCIATICA: ICD-10-CM

## 2025-03-04 DIAGNOSIS — T50.904A DRUG OVERDOSE OF UNDETERMINED INTENT, INITIAL ENCOUNTER: Primary | ICD-10-CM

## 2025-03-04 DIAGNOSIS — M54.9 BACK PAIN WITH SCIATICA: ICD-10-CM

## 2025-03-04 LAB
ANION GAP SERPL CALCULATED.3IONS-SCNC: 16 MMOL/L (ref 9–16)
BASOPHILS # BLD: <0.03 K/UL (ref 0–0.2)
BASOPHILS NFR BLD: 0 % (ref 0–2)
BUN SERPL-MCNC: 17 MG/DL (ref 6–20)
CALCIUM SERPL-MCNC: 9 MG/DL (ref 8.6–10.4)
CHLORIDE SERPL-SCNC: 104 MMOL/L (ref 98–107)
CHP ED QC CHECK: YES
CO2 SERPL-SCNC: 18 MMOL/L (ref 20–31)
CREAT SERPL-MCNC: 1.3 MG/DL (ref 0.7–1.2)
EOSINOPHIL # BLD: 0.04 K/UL (ref 0–0.44)
EOSINOPHILS RELATIVE PERCENT: 0 % (ref 1–4)
ERYTHROCYTE [DISTWIDTH] IN BLOOD BY AUTOMATED COUNT: 12 % (ref 11.8–14.4)
GFR, ESTIMATED: ABNORMAL ML/MIN/1.73M2
GLUCOSE BLD-MCNC: 266 MG/DL (ref 75–110)
GLUCOSE SERPL-MCNC: 338 MG/DL (ref 74–99)
HCT VFR BLD AUTO: 40.8 % (ref 40.7–50.3)
HGB BLD-MCNC: 12.9 G/DL (ref 13–17)
IMM GRANULOCYTES # BLD AUTO: 0.03 K/UL (ref 0–0.3)
IMM GRANULOCYTES NFR BLD: 0 %
LACTIC ACID, WHOLE BLOOD: 5.2 MMOL/L (ref 0.7–2.1)
LYMPHOCYTES NFR BLD: 4.18 K/UL (ref 1.1–3.7)
LYMPHOCYTES RELATIVE PERCENT: 45 % (ref 24–43)
MCH RBC QN AUTO: 29.5 PG (ref 25.2–33.5)
MCHC RBC AUTO-ENTMCNC: 31.6 G/DL (ref 28.4–34.8)
MCV RBC AUTO: 93.2 FL (ref 82.6–102.9)
MONOCYTES NFR BLD: 0.54 K/UL (ref 0.1–1.2)
MONOCYTES NFR BLD: 6 % (ref 3–12)
NEUTROPHILS NFR BLD: 49 % (ref 36–65)
NEUTS SEG NFR BLD: 4.5 K/UL (ref 1.5–8.1)
NRBC BLD-RTO: 0 PER 100 WBC
PLATELET # BLD AUTO: 258 K/UL (ref 138–453)
PMV BLD AUTO: 10.7 FL (ref 8.1–13.5)
POTASSIUM SERPL-SCNC: 4.9 MMOL/L (ref 3.7–5.3)
RBC # BLD AUTO: 4.38 M/UL (ref 4.21–5.77)
SODIUM SERPL-SCNC: 138 MMOL/L (ref 136–145)
TROPONIN I SERPL HS-MCNC: 18 NG/L (ref 0–22)
WBC OTHER # BLD: 9.3 K/UL (ref 3.5–11.3)

## 2025-03-04 PROCEDURE — 85025 COMPLETE CBC W/AUTO DIFF WBC: CPT

## 2025-03-04 PROCEDURE — 93005 ELECTROCARDIOGRAM TRACING: CPT

## 2025-03-04 PROCEDURE — 6370000000 HC RX 637 (ALT 250 FOR IP)

## 2025-03-04 PROCEDURE — 84484 ASSAY OF TROPONIN QUANT: CPT

## 2025-03-04 PROCEDURE — 82947 ASSAY GLUCOSE BLOOD QUANT: CPT

## 2025-03-04 PROCEDURE — 83605 ASSAY OF LACTIC ACID: CPT

## 2025-03-04 PROCEDURE — 6360000002 HC RX W HCPCS

## 2025-03-04 PROCEDURE — 71045 X-RAY EXAM CHEST 1 VIEW: CPT

## 2025-03-04 PROCEDURE — 99285 EMERGENCY DEPT VISIT HI MDM: CPT | Performed by: EMERGENCY MEDICINE

## 2025-03-04 PROCEDURE — 96372 THER/PROPH/DIAG INJ SC/IM: CPT | Performed by: EMERGENCY MEDICINE

## 2025-03-04 PROCEDURE — 80048 BASIC METABOLIC PNL TOTAL CA: CPT

## 2025-03-04 RX ORDER — LIDOCAINE 4 G/G
1 PATCH TOPICAL ONCE
Status: DISCONTINUED | OUTPATIENT
Start: 2025-03-04 | End: 2025-03-04 | Stop reason: HOSPADM

## 2025-03-04 RX ORDER — KETOROLAC TROMETHAMINE 15 MG/ML
15 INJECTION, SOLUTION INTRAMUSCULAR; INTRAVENOUS ONCE
Status: COMPLETED | OUTPATIENT
Start: 2025-03-04 | End: 2025-03-04

## 2025-03-04 RX ORDER — ONDANSETRON 2 MG/ML
4 INJECTION INTRAMUSCULAR; INTRAVENOUS ONCE
Status: DISCONTINUED | OUTPATIENT
Start: 2025-03-04 | End: 2025-03-04 | Stop reason: HOSPADM

## 2025-03-04 RX ADMIN — KETOROLAC TROMETHAMINE 15 MG: 15 INJECTION, SOLUTION INTRAMUSCULAR; INTRAVENOUS at 17:25

## 2025-03-04 NOTE — ED PROVIDER NOTES
[facility] EMERGENCY DEPARTMENT  Emergency Department Encounter  Emergency Medicine Resident     Pt Name:Elizabeth Alex  MRN: [id number]  Birthdate 1970  Date of evaluation: 3/4/25  PCP:  No primary care provider on file.  Note Started: 2:55 PM EST      CHIEF COMPLAINT       Chief Complaint   Patient presents with    Drug Overdose       HISTORY OF PRESENT ILLNESS  (Location/Symptom, Timing/Onset, Context/Setting, Quality, Duration, Modifying Factors, Severity.)      Elizabeth Alex is a 55 y.o. adult who presents unresponsive.  Medical triage was called from the ED lobby.  Patient was found outside the car door on the ground unresponsive, with a pulse.  Patient's  states that he gave a dose of intranasal 2 mg Narcan prior to arrival, and the triage nurse provided a another intranasal 2 mg dose.  Patient was picked up and placed on a stretcher.  Upon arriving in the resuscitation bay, she was arousable and awake.  She was placed on a nonrebreather requiring some redirection's to that she was in the hospital receiving treatment for an overdose.  She was able to state that she thought that she was \"just doing a line of coke\".  She states that she is on Suboxone and has not been doing drugs in a long time and that she just recently relapsed.  Denies any chest pain, nausea, vomiting, abdominal pain, head pain.    PAST MEDICAL / SURGICAL / SOCIAL / FAMILY HISTORY      has no past medical history on file.       has no past surgical history on file.      Social History     Socioeconomic History    Marital status: Single     Spouse name: Not on file    Number of children: Not on file    Years of education: Not on file    Highest education level: Not on file   Occupational History    Not on file   Tobacco Use    Smoking status: Not on file    Smokeless tobacco: Not on file   Substance and Sexual Activity    Alcohol use: Not on file    Drug use: Not on file    Sexual activity: Not on file   Other Topics Concern 
  FACULTY SIGN-OUT  ADDENDUM     Care of this patient was assumed from previous attending physician. The patient's initial evaluation and plan have been discussed with the prior provider who initially evaluated the patient.      Note Started: 3:58 PM EST    Attestation  I was available and discussed any additional care issues that arose and coordinated the management plans with the resident(s) caring for the patient during my duty period. Any areas of disagreement with resident's documentation of care or procedures are noted on the chart. I was personally present for the key portions of any/all procedures, during my duty period. I have documented in the chart those procedures where I was not present during the key portions.       ED COURSE      The patient was given the following medications:  Orders Placed This Encounter   Medications    ondansetron (ZOFRAN) injection 4 mg       RECENT VITALS:    , Pulse: (!) 116, Respirations: 22, BP: 113/72    MEDICAL DECISION MAKING        Grant Bauer is a 89 y.o. adult who presents to the Emergency Department with complaints of drug overdose.  Patient received Narcan by family member and additional Narcan upon immediate arrival to the emergency department.  Patient now awake but complaining of shortness of breath.  Workup undergoing at this time.    Patient currently awake alert.  Per the resident she did ambulate without any desaturations off of oxygen.  Patient is instructed to quit using illicit drugs.        Jayy Mendes MD, MD, F.A.C.E.P.  Attending Emergency Physician    (Please note that portions of this note were completed with a voice recognition program.  Efforts were made to edit the dictations but occasionally words are mis-transcribed.)          Jayy Mendes MD  03/04/25 [id number]    
minutes minutes.  This excludes any time for separately reportable procedures.       Darrin Maldonado MD, FACEP, FAAEM  Attending Emergency  Physician           Darrin Maldonado MD  03/04/25 [id number]

## 2025-03-04 NOTE — ED NOTES
Medical called to triage for dug overdose. Pt placed on non re breather.  Pt received 4mg IN narcan. Pt alert after receiving narcan.  Pt reporting using cocaine today and reports it must have been laced.  Pt spouse reporting pt in rehab and currently using Suboxone.   EKG obtained, IV inserted.

## 2025-03-04 NOTE — ED NOTES
Patient stated she used what she thought was cocaine today, denied interest in [facility] tx programs. Patient expressed concern over sciatic pain & requested list of back surgeons, stating she was supposed to follow up with one.  Writer unable to obtain a list from [employer] website, will provide patient [employer] customer support number & advise patient to either contact [employer] for a list of surgeons or request a referral from her PCP.

## 2025-03-04 NOTE — DISCHARGE INSTRUCTIONS
You were seen in the emergency department for drug overdose.  After laboratory and imaging workup we do not have any indication to admit you to the hospital for further evaluation.  We recommend that you find treatment facilities for your drug abuse on the listed attached QR code.    Please stop consuming illicit drugs.    If you have any worsening symptoms, chest pain, shortness of breath, nausea, vomiting, any other concerning symptom or complaint, please return to the emergency department for further evaluation..

## 2025-03-06 LAB
EKG ATRIAL RATE: 112 BPM
EKG P AXIS: 46 DEGREES
EKG P-R INTERVAL: 124 MS
EKG Q-T INTERVAL: 340 MS
EKG QRS DURATION: 88 MS
EKG QTC CALCULATION (BAZETT): 464 MS
EKG R AXIS: 84 DEGREES
EKG T AXIS: 56 DEGREES
EKG VENTRICULAR RATE: 112 BPM

## 2025-04-11 ENCOUNTER — OFFICE VISIT (OUTPATIENT)
Dept: NEUROSURGERY | Age: 55
End: 2025-04-11
Payer: MEDICAID

## 2025-04-11 ENCOUNTER — TELEPHONE (OUTPATIENT)
Dept: NEUROSURGERY | Age: 55
End: 2025-04-11

## 2025-04-11 ENCOUNTER — CLINICAL DOCUMENTATION (OUTPATIENT)
Dept: NEUROLOGY | Age: 55
End: 2025-04-11

## 2025-04-11 VITALS
HEIGHT: 62 IN | DIASTOLIC BLOOD PRESSURE: 91 MMHG | WEIGHT: 113.2 LBS | SYSTOLIC BLOOD PRESSURE: 124 MMHG | HEART RATE: 83 BPM | BODY MASS INDEX: 20.83 KG/M2

## 2025-04-11 DIAGNOSIS — M47.22 CERVICAL SPONDYLOSIS WITH RADICULOPATHY: ICD-10-CM

## 2025-04-11 DIAGNOSIS — Z98.890 HISTORY OF LUMBAR DISCECTOMY: ICD-10-CM

## 2025-04-11 DIAGNOSIS — M47.26 OTHER SPONDYLOSIS WITH RADICULOPATHY, LUMBAR REGION: Primary | ICD-10-CM

## 2025-04-11 PROCEDURE — 99204 OFFICE O/P NEW MOD 45 MIN: CPT

## 2025-04-11 RX ORDER — GABAPENTIN 300 MG/1
300 CAPSULE ORAL 3 TIMES DAILY
COMMUNITY
Start: 2025-03-24

## 2025-04-11 RX ORDER — DULOXETIN HYDROCHLORIDE 30 MG/1
CAPSULE, DELAYED RELEASE ORAL DAILY
COMMUNITY
Start: 2025-03-20

## 2025-04-11 RX ORDER — BUPRENORPHINE AND NALOXONE 8; 2 MG/1; MG/1
FILM, SOLUBLE BUCCAL; SUBLINGUAL
COMMUNITY
Start: 2025-04-08

## 2025-04-11 NOTE — PROGRESS NOTES
[facility] NEUROSURGERY [facility]  [address]  [address]  [address]  [facility] [address]  Dept: [phone]    Patient:  Elizabeth Alex  YOB: 1970  Date: 4/2/25    The patient is a 55 y.o. female who presents today for consult of the following problems:     Chief Complaint   Patient presents with    Other     Sciatic problems         HPI:     Elizabeth Alex is a 55 y.o. female on whom neurosurgical consultation was requested by Dario Michelle APRN - CNP for management of radiating neck and lower back pain.    Neck  The patient reports chronic neck discomfort, described as feeling like it constantly needs to crack, with symptoms that are intermittent. The pain is described as a dull, hot screwdriver sensation, often radiating into both arms and associated with numbness in the hands. She notes weakness in the arms and difficulty with dexterity, attributed to the numbness. Pain is aggravated by range of motion, particularly with turning the head, which occasionally causes her to see stars. Rest, specific positioning, and stretching help alleviate the symptoms. She has a history of cervical spine surgery and also notes occasional balance disturbances.       Back  The patient reports chronic neck discomfort, described as feeling like it constantly needs to crack, with symptoms that are intermittent. The pain is described as a dull, hot screwdriver sensation, often radiating into both arms and associated with numbness in the hands. She notes weakness in the arms and difficulty with dexterity, attributed to the numbness. Pain is aggravated by range of motion, particularly with turning the head, which occasionally causes her to see stars. Rest, specific positioning, and stretching help alleviate the symptoms. She has a history of cervical spine surgery and also notes occasional balance disturbances.    history of L5-S1 discectomy

## 2025-04-15 ASSESSMENT — ENCOUNTER SYMPTOMS: BACK PAIN: 1

## 2025-04-18 ENCOUNTER — TRANSCRIBE ORDERS (OUTPATIENT)
Dept: ADMINISTRATIVE | Age: 55
End: 2025-04-18

## 2025-04-18 DIAGNOSIS — Z12.2 ENCOUNTER FOR SCREENING FOR MALIGNANT NEOPLASM OF RESPIRATORY ORGANS: Primary | ICD-10-CM

## 2025-04-18 DIAGNOSIS — F17.210 CIGARETTE SMOKER: ICD-10-CM

## 2025-04-22 ENCOUNTER — HOSPITAL ENCOUNTER (OUTPATIENT)
Dept: GENERAL RADIOLOGY | Age: 55
Discharge: HOME | End: 2025-04-24
Payer: MEDICAID

## 2025-04-22 ENCOUNTER — HOSPITAL ENCOUNTER (OUTPATIENT)
Age: 55
Discharge: HOME | End: 2025-04-22
Payer: MEDICAID

## 2025-04-22 DIAGNOSIS — M47.26 OTHER SPONDYLOSIS WITH RADICULOPATHY, LUMBAR REGION: ICD-10-CM

## 2025-04-22 DIAGNOSIS — Z98.890 HISTORY OF LUMBAR DISCECTOMY: ICD-10-CM

## 2025-04-22 DIAGNOSIS — M47.22 CERVICAL SPONDYLOSIS WITH RADICULOPATHY: ICD-10-CM

## 2025-04-22 LAB
ALBUMIN SERPL-MCNC: 4.2 G/DL (ref 3.5–5.2)
ALBUMIN/GLOB SERPL: 1.8 {RATIO} (ref 1–2.5)
ALP SERPL-CCNC: 98 U/L (ref 35–104)
ALT SERPL-CCNC: 16 U/L (ref 10–35)
ANION GAP SERPL CALCULATED.3IONS-SCNC: 9 MMOL/L (ref 9–16)
AST SERPL-CCNC: 23 U/L (ref 10–35)
BASOPHILS # BLD: 0.06 K/UL (ref 0–0.2)
BASOPHILS NFR BLD: 1 % (ref 0–2)
BILIRUB SERPL-MCNC: 0.2 MG/DL (ref 0–1.2)
BUN SERPL-MCNC: 25 MG/DL (ref 6–20)
CALCIUM SERPL-MCNC: 9.2 MG/DL (ref 8.6–10.4)
CHLORIDE SERPL-SCNC: 104 MMOL/L (ref 98–107)
CHOLEST SERPL-MCNC: 145 MG/DL (ref 0–199)
CHOLESTEROL/HDL RATIO: 2
CO2 SERPL-SCNC: 25 MMOL/L (ref 20–31)
CREAT SERPL-MCNC: 0.7 MG/DL (ref 0.6–0.9)
EOSINOPHIL # BLD: 0.11 K/UL (ref 0–0.44)
EOSINOPHILS RELATIVE PERCENT: 2 % (ref 1–4)
ERYTHROCYTE [DISTWIDTH] IN BLOOD BY AUTOMATED COUNT: 12.1 % (ref 11.8–14.4)
EST. AVERAGE GLUCOSE BLD GHB EST-MCNC: 108 MG/DL
GFR, ESTIMATED: >90 ML/MIN/1.73M2
GLUCOSE SERPL-MCNC: 93 MG/DL (ref 74–99)
HBA1C MFR BLD: 5.4 % (ref 4–6)
HCT VFR BLD AUTO: 39.8 % (ref 36.3–47.1)
HDLC SERPL-MCNC: 71 MG/DL
HGB BLD-MCNC: 12.8 G/DL (ref 11.9–15.1)
IMM GRANULOCYTES # BLD AUTO: <0.03 K/UL (ref 0–0.3)
IMM GRANULOCYTES NFR BLD: 0 %
LDLC SERPL CALC-MCNC: 67 MG/DL (ref 0–100)
LYMPHOCYTES NFR BLD: 2.33 K/UL (ref 1.1–3.7)
LYMPHOCYTES RELATIVE PERCENT: 37 % (ref 24–43)
MCH RBC QN AUTO: 29.3 PG (ref 25.2–33.5)
MCHC RBC AUTO-ENTMCNC: 32.2 G/DL (ref 28.4–34.8)
MCV RBC AUTO: 91.1 FL (ref 82.6–102.9)
MONOCYTES NFR BLD: 0.41 K/UL (ref 0.1–1.2)
MONOCYTES NFR BLD: 7 % (ref 3–12)
NEUTROPHILS NFR BLD: 53 % (ref 36–65)
NEUTS SEG NFR BLD: 3.39 K/UL (ref 1.5–8.1)
NRBC BLD-RTO: 0 PER 100 WBC
PLATELET # BLD AUTO: 235 K/UL (ref 138–453)
PMV BLD AUTO: 10.9 FL (ref 8.1–13.5)
POTASSIUM SERPL-SCNC: 4.5 MMOL/L (ref 3.7–5.3)
PROT SERPL-MCNC: 6.6 G/DL (ref 6.6–8.7)
RBC # BLD AUTO: 4.37 M/UL (ref 3.95–5.11)
SODIUM SERPL-SCNC: 138 MMOL/L (ref 136–145)
T3 SERPL-MCNC: 97 NG/DL (ref 80–200)
T4 FREE SERPL-MCNC: 1.1 NG/DL (ref 0.92–1.68)
TRIGL SERPL-MCNC: 34 MG/DL
TSH SERPL DL<=0.05 MIU/L-ACNC: 5.21 UIU/ML (ref 0.27–4.2)
VLDLC SERPL CALC-MCNC: 7 MG/DL (ref 1–30)
WBC OTHER # BLD: 6.3 K/UL (ref 3.5–11.3)

## 2025-04-22 PROCEDURE — 84443 ASSAY THYROID STIM HORMONE: CPT

## 2025-04-22 PROCEDURE — 80053 COMPREHEN METABOLIC PANEL: CPT

## 2025-04-22 PROCEDURE — 72050 X-RAY EXAM NECK SPINE 4/5VWS: CPT

## 2025-04-22 PROCEDURE — 85025 COMPLETE CBC W/AUTO DIFF WBC: CPT

## 2025-04-22 PROCEDURE — 83036 HEMOGLOBIN GLYCOSYLATED A1C: CPT

## 2025-04-22 PROCEDURE — 80061 LIPID PANEL: CPT

## 2025-04-22 PROCEDURE — 72082 X-RAY EXAM ENTIRE SPI 2/3 VW: CPT

## 2025-04-22 PROCEDURE — 84480 ASSAY TRIIODOTHYRONINE (T3): CPT

## 2025-04-22 PROCEDURE — 84439 ASSAY OF FREE THYROXINE: CPT

## 2025-04-22 PROCEDURE — 72110 X-RAY EXAM L-2 SPINE 4/>VWS: CPT

## 2025-04-22 PROCEDURE — 36415 COLL VENOUS BLD VENIPUNCTURE: CPT

## 2025-04-24 ENCOUNTER — HOSPITAL ENCOUNTER (OUTPATIENT)
Age: 55
Setting detail: THERAPIES SERIES
Discharge: HOME | End: 2025-04-24
Payer: MEDICAID

## 2025-04-24 PROCEDURE — 97162 PT EVAL MOD COMPLEX 30 MIN: CPT

## 2025-04-24 PROCEDURE — 97110 THERAPEUTIC EXERCISES: CPT

## 2025-04-24 NOTE — THERAPY EVALUATION
[x] [facility]  Outpatient Rehabilitation &  Therapy  [address]  [phone]  F: [phone]    Physical Therapy Spine Evaluation    Date:  2025  Patient: Elizabeth Alex  : 1970  MRN: [id number]  Physician: Iesha Borrero APRN - CNP      Insurance: [facility] MEDICAID  - AUTH AFTER 30TH VISIT   Medical Diagnosis: M47.26 (ICD-10-CM) - Other spondylosis with radiculopathy, lumbar region    Z98.890 (ICD-10-CM) - History of lumbar discectomy   M47.22 (ICD-10-CM) - Cervical spondylosis with radiculopathy   Rehab Codes: M25.551, Z91.81  Onset Date: 2025     Next 's appt.: 2025 - Neurosurgery      Subjective:   CC/HPI: Patient is a 55 year old female who presents to outpatient physical therapy with c/o RLE pain. Pt stating  sciatic pain starting 2024 progressing in 2025. For \"a month or two\" after the level of pain in the R hip traveling down throughout the entire RLE. Pt reporting falling in February when attempting to ambulate using a cane. Stating the fall this caused pain in the low back, but did not change the symptoms in the R hip and the RLE. Pt not presenting to therapy with an assistive device at this time. Pt saw neurologist last , and reported development of an inc in pain in the R hip the following day. Pt presenting to therapy this date with referrals for cervical spondylosis with radiculopathy, lumbar spondylosis with radiculopathy, and a history of lumbar discectomy.     Pt has a past medical history of ovarian cysts.     PMHx: [x] Refer to full medical chart in [facility]   [] Unremarkable [] Diabetes [] HTN  [] Pacemaker   [] MI/Heart Problems [] Cancer [] Arthritis   [x] Other:  Past Medical History:   Diagnosis Date    Abdominal pain 2018    Acute cystitis without hematuria 2018    Acute laryngitis 2018    Anorexia 2017    Anxiety     Anxiety attack 2018    Asthma     Backache 2017

## 2025-04-28 ENCOUNTER — TELEPHONE (OUTPATIENT)
Dept: NEUROLOGY | Age: 55
End: 2025-04-28

## 2025-04-28 ENCOUNTER — HOSPITAL ENCOUNTER (EMERGENCY)
Age: 55
Discharge: HOME | End: 2025-04-28
Attending: EMERGENCY MEDICINE
Payer: MEDICAID

## 2025-04-28 VITALS
OXYGEN SATURATION: 99 % | DIASTOLIC BLOOD PRESSURE: 96 MMHG | SYSTOLIC BLOOD PRESSURE: 134 MMHG | RESPIRATION RATE: 18 BRPM | HEART RATE: 81 BPM | TEMPERATURE: 97.7 F

## 2025-04-28 DIAGNOSIS — M47.26 OTHER SPONDYLOSIS WITH RADICULOPATHY, LUMBAR REGION: Primary | ICD-10-CM

## 2025-04-28 DIAGNOSIS — M54.50 ACUTE EXACERBATION OF CHRONIC LOW BACK PAIN: Primary | ICD-10-CM

## 2025-04-28 DIAGNOSIS — M47.22 CERVICAL SPONDYLOSIS WITH RADICULOPATHY: ICD-10-CM

## 2025-04-28 DIAGNOSIS — G89.29 ACUTE EXACERBATION OF CHRONIC LOW BACK PAIN: Primary | ICD-10-CM

## 2025-04-28 PROCEDURE — 96372 THER/PROPH/DIAG INJ SC/IM: CPT | Performed by: EMERGENCY MEDICINE

## 2025-04-28 PROCEDURE — 6360000002 HC RX W HCPCS

## 2025-04-28 PROCEDURE — 6370000000 HC RX 637 (ALT 250 FOR IP)

## 2025-04-28 PROCEDURE — 99284 EMERGENCY DEPT VISIT MOD MDM: CPT | Performed by: EMERGENCY MEDICINE

## 2025-04-28 RX ORDER — KETOROLAC TROMETHAMINE 30 MG/ML
30 INJECTION, SOLUTION INTRAMUSCULAR; INTRAVENOUS ONCE
Status: COMPLETED | OUTPATIENT
Start: 2025-04-28 | End: 2025-04-28

## 2025-04-28 RX ORDER — CYCLOBENZAPRINE HCL 10 MG
10 TABLET ORAL 3 TIMES DAILY PRN
Qty: 21 TABLET | Refills: 0 | Status: SHIPPED | OUTPATIENT
Start: 2025-04-28 | End: 2025-05-08

## 2025-04-28 RX ORDER — PREDNISONE 10 MG/1
TABLET ORAL
Qty: 20 TABLET | Refills: 0 | Status: SHIPPED | OUTPATIENT
Start: 2025-04-28 | End: 2025-05-08

## 2025-04-28 RX ORDER — PREDNISONE 20 MG/1
60 TABLET ORAL ONCE
Status: COMPLETED | OUTPATIENT
Start: 2025-04-28 | End: 2025-04-28

## 2025-04-28 RX ORDER — ORPHENADRINE CITRATE 30 MG/ML
60 INJECTION INTRAMUSCULAR; INTRAVENOUS ONCE
Status: COMPLETED | OUTPATIENT
Start: 2025-04-28 | End: 2025-04-28

## 2025-04-28 RX ADMIN — PREDNISONE 60 MG: 20 TABLET ORAL at 18:19

## 2025-04-28 RX ADMIN — KETOROLAC TROMETHAMINE 30 MG: 30 INJECTION, SOLUTION INTRAMUSCULAR at 18:25

## 2025-04-28 RX ADMIN — ORPHENADRINE CITRATE 60 MG: 60 INJECTION INTRAMUSCULAR; INTRAVENOUS at 18:20

## 2025-04-28 ASSESSMENT — PAIN SCALES - GENERAL: PAINLEVEL_OUTOF10: 10

## 2025-04-28 ASSESSMENT — ENCOUNTER SYMPTOMS: BACK PAIN: 1

## 2025-04-28 NOTE — ED TRIAGE NOTES
Yo female arrived to ED through triage with c/o lower back pain that radiates down right leg.  Patient states this is a chronic issue with no new injuries.   Patient states she saw her PCP on Friday and received steroid shop with little relief.  Patient is alert and oriented times 4, speaking full sentences, and answering questions appropriately

## 2025-04-28 NOTE — DISCHARGE INSTRUCTIONS
Thank you for visiting [facility] Emergency Department.    You were given a dose of steroids today and a prescription as well as for muscle relaxers.  Please continue taking the Motrin and Tylenol and using the lidocaine patches.  You need to follow-up with both your primary care doctor and your neurosurgery team.  You may need pain management clinic for chronic long-term pain medication.  Please continue your physical therapy as tolerated.    Return to the emergency department if you develop any weakness, numbness, loss of control of your bowel or bladder, inability to walk.    You need to call Dario Michelle APRN - CNP to make an appointment as directed for follow up.    Should you have any questions regarding your care or further treatment, please call [facility] Emergency Department at [phone].

## 2025-04-28 NOTE — TELEPHONE ENCOUNTER
Patient called in stating she is in a lot of pain located in the front pelvic area, ankles and top of foot . She's been using lidocaine patches. Patient stated she \"can't handle it\". She has recently imaging done and would like those results. She doesn't know what to do about the pain, please advise.     Patient states she is going to ED at [facility] today.

## 2025-04-28 NOTE — ED PROVIDER NOTES
[facility]     Emergency Department     Faculty Note/ Attestation      Pt Name: Elizabeth Alex                                       MRN: [id number]  Birthdate 1970  Date of evaluation: 4/28/2025  Note Started: 6:05 PM EDT    Patients PCP:    Dario Michelle, LISANDRA - CNP    Attestation  I performed a history and physical examination of the patient and discussed management with the resident. I reviewed the resident’s note and agree with the documented findings and plan of care. Any areas of disagreement are noted on the chart. I was personally present for the key portions of any procedures. I have documented in the chart those procedures where I was not present during the key portions. I have reviewed the emergency nurses triage note. I agree with the chief complaint, past medical history, past surgical history, allergies, medications, social and family history as documented unless otherwise noted below.    For Physician Assistant/ Nurse Practitioner cases/documentation I have personally evaluated this patient and have completed at least one if not all key elements of the E/M (history, physical exam, and MDM). Additional findings are as noted.    Initial Screens:        Victorino Coma Scale  Eye Opening: Spontaneous  Best Verbal Response: Oriented  Best Motor Response: Obeys commands  Victorino Coma Scale Score: 15    Vitals:    Vitals:    04/28/25 1743   BP: (!) 134/96   Pulse: 81   Resp: 18   Temp: 97.7 °F (36.5 °C)   SpO2: 99%       CHIEF COMPLAINT       Chief Complaint   Patient presents with    Back Pain     The pt is a 54 YO F who arrives with prior drug ODs.  The pt on suboxone, with chronic low back pain has been seen by neurosurgery.  The pt was given a steroid injection and has been trying lidocaine, APAP, and ibuprofen.  The pt noting severe pain no loss of bowel or bladder.  The pt only has Ashley on oral meds and has never used IV drugs.  She notes no vomiting fevers or 
not be prescribed from the emergency department at this time.  It was discussed that sciatica may take a long time to heal and that she needs ongoing follow-up.  Strict return precautions were provided.  Discharged home in stable condition.    Risk  Prescription drug management.      CONSULTS:  None    CRITICAL CARE:  There was significant risk of life threatening deterioration of patient's condition requiring my direct management. Critical care time 0 minutes, excluding any documented procedures.    FINAL IMPRESSION      1. Acute exacerbation of chronic low back pain          DISPOSITION / PLAN     DISPOSITION Decision To Discharge 04/28/2025 06:23:52 PM   DISPOSITION CONDITION Stable           PATIENT REFERRED TO:  Dario Michelle, APRN - CNP  [address]  [place] [address]  [phone]          [facility]  [address] [address]  [phone]          DISCHARGE MEDICATIONS:  New Prescriptions    CYCLOBENZAPRINE (FLEXERIL) 10 MG TABLET    Take 1 tablet by mouth 3 times daily as needed for Muscle spasms    PREDNISONE (DELTASONE) 10 MG TABLET    Take 4 tablets by mouth once daily for 5 days       Emiliana Carrero MD  Emergency Medicine Resident    (Please note that portions of thisnote were completed with a voice recognition program.  Efforts were made to edit the dictations but occasionally words are mis-transcribed.)

## 2025-04-29 NOTE — TELEPHONE ENCOUNTER
Patient called, told patient about aqua therapy as an option. Patient said she was willing to do whatever to make the pain go away. Prefers somewhere closer to her home if possible.

## 2025-04-29 NOTE — TELEPHONE ENCOUNTER
Attempted to contact patient, left voicemail. Requested a call back if she is interested in aqua therapy. Also gave results from imaging.

## 2025-04-30 ENCOUNTER — APPOINTMENT (OUTPATIENT)
Dept: CT IMAGING | Age: 55
End: 2025-04-30
Payer: MEDICAID

## 2025-04-30 ENCOUNTER — HOSPITAL ENCOUNTER (OUTPATIENT)
Age: 55
Setting detail: OBSERVATION
Discharge: HOME | End: 2025-05-02
Attending: EMERGENCY MEDICINE | Admitting: NEUROLOGICAL SURGERY
Payer: MEDICAID

## 2025-04-30 DIAGNOSIS — G89.18 POST-OPERATIVE PAIN: ICD-10-CM

## 2025-04-30 DIAGNOSIS — M54.16 LUMBAR RADICULOPATHY: Primary | ICD-10-CM

## 2025-04-30 PROCEDURE — 96372 THER/PROPH/DIAG INJ SC/IM: CPT

## 2025-04-30 PROCEDURE — 99285 EMERGENCY DEPT VISIT HI MDM: CPT

## 2025-04-30 PROCEDURE — 6370000000 HC RX 637 (ALT 250 FOR IP): Performed by: EMERGENCY MEDICINE

## 2025-04-30 PROCEDURE — 6360000002 HC RX W HCPCS: Performed by: EMERGENCY MEDICINE

## 2025-04-30 PROCEDURE — 72131 CT LUMBAR SPINE W/O DYE: CPT

## 2025-04-30 RX ORDER — GABAPENTIN 300 MG/1
300 CAPSULE ORAL ONCE
Status: COMPLETED | OUTPATIENT
Start: 2025-04-30 | End: 2025-04-30

## 2025-04-30 RX ORDER — ORPHENADRINE CITRATE 30 MG/ML
60 INJECTION INTRAMUSCULAR; INTRAVENOUS ONCE
Status: COMPLETED | OUTPATIENT
Start: 2025-04-30 | End: 2025-04-30

## 2025-04-30 RX ORDER — KETOROLAC TROMETHAMINE 30 MG/ML
30 INJECTION, SOLUTION INTRAMUSCULAR; INTRAVENOUS ONCE
Status: COMPLETED | OUTPATIENT
Start: 2025-04-30 | End: 2025-04-30

## 2025-04-30 RX ORDER — ACETAMINOPHEN 500 MG
1000 TABLET ORAL ONCE
Status: COMPLETED | OUTPATIENT
Start: 2025-04-30 | End: 2025-04-30

## 2025-04-30 RX ORDER — LIDOCAINE 4 G/G
1 PATCH TOPICAL ONCE
Status: COMPLETED | OUTPATIENT
Start: 2025-04-30 | End: 2025-05-01

## 2025-04-30 RX ADMIN — ACETAMINOPHEN 1000 MG: 500 TABLET ORAL at 22:05

## 2025-04-30 RX ADMIN — ORPHENADRINE CITRATE 60 MG: 60 INJECTION INTRAMUSCULAR; INTRAVENOUS at 22:06

## 2025-04-30 RX ADMIN — KETOROLAC TROMETHAMINE 30 MG: 30 INJECTION, SOLUTION INTRAMUSCULAR at 22:06

## 2025-04-30 RX ADMIN — GABAPENTIN 300 MG: 300 CAPSULE ORAL at 22:05

## 2025-04-30 ASSESSMENT — PAIN - FUNCTIONAL ASSESSMENT: PAIN_FUNCTIONAL_ASSESSMENT: 0-10

## 2025-04-30 ASSESSMENT — PAIN SCALES - GENERAL
PAINLEVEL_OUTOF10: 10
PAINLEVEL_OUTOF10: 10

## 2025-04-30 ASSESSMENT — ENCOUNTER SYMPTOMS
ABDOMINAL PAIN: 0
SHORTNESS OF BREATH: 0

## 2025-04-30 ASSESSMENT — PAIN DESCRIPTION - DESCRIPTORS: DESCRIPTORS: ACHING

## 2025-04-30 ASSESSMENT — PAIN DESCRIPTION - LOCATION: LOCATION: BACK

## 2025-05-01 ENCOUNTER — APPOINTMENT (OUTPATIENT)
Dept: MRI IMAGING | Age: 55
End: 2025-05-01
Payer: MEDICAID

## 2025-05-01 ENCOUNTER — APPOINTMENT (OUTPATIENT)
Dept: GENERAL RADIOLOGY | Age: 55
End: 2025-05-01
Payer: MEDICAID

## 2025-05-01 PROBLEM — M51.16 LUMBAR DISC HERNIATION WITH RADICULOPATHY: Status: ACTIVE | Noted: 2025-05-01

## 2025-05-01 PROBLEM — M54.50 LUMBAR PAIN: Status: ACTIVE | Noted: 2025-05-01

## 2025-05-01 LAB
ABO + RH BLD: NORMAL
ANION GAP SERPL CALCULATED.3IONS-SCNC: 10 MMOL/L (ref 9–16)
ARM BAND NUMBER: NORMAL
BACTERIA URNS QL MICRO: ABNORMAL
BASOPHILS # BLD: <0.03 K/UL (ref 0–0.2)
BASOPHILS NFR BLD: 0 % (ref 0–2)
BILIRUB UR QL STRIP: NEGATIVE
BLOOD BANK SAMPLE EXPIRATION: NORMAL
BLOOD GROUP ANTIBODIES SERPL: NEGATIVE
BUN SERPL-MCNC: 17 MG/DL (ref 6–20)
CALCIUM SERPL-MCNC: 9.2 MG/DL (ref 8.6–10.4)
CASTS #/AREA URNS LPF: ABNORMAL /LPF (ref 0–8)
CHLORIDE SERPL-SCNC: 104 MMOL/L (ref 98–107)
CLARITY UR: ABNORMAL
CO2 SERPL-SCNC: 25 MMOL/L (ref 20–31)
COLOR UR: YELLOW
CREAT SERPL-MCNC: 0.7 MG/DL (ref 0.6–0.9)
EOSINOPHIL # BLD: 0.03 K/UL (ref 0–0.44)
EOSINOPHILS RELATIVE PERCENT: 0 % (ref 1–4)
EPI CELLS #/AREA URNS HPF: ABNORMAL /HPF (ref 0–5)
ERYTHROCYTE [DISTWIDTH] IN BLOOD BY AUTOMATED COUNT: 12.4 % (ref 11.8–14.4)
GFR, ESTIMATED: >90 ML/MIN/1.73M2
GLUCOSE SERPL-MCNC: 97 MG/DL (ref 74–99)
GLUCOSE UR STRIP-MCNC: NEGATIVE MG/DL
HCG SERPL QL: NEGATIVE
HCT VFR BLD AUTO: 40 % (ref 36.3–47.1)
HGB BLD-MCNC: 12.9 G/DL (ref 11.9–15.1)
HGB UR QL STRIP.AUTO: NEGATIVE
IMM GRANULOCYTES # BLD AUTO: <0.03 K/UL (ref 0–0.3)
IMM GRANULOCYTES NFR BLD: 0 %
INR PPP: 1
KETONES UR STRIP-MCNC: NEGATIVE MG/DL
LEUKOCYTE ESTERASE UR QL STRIP: NEGATIVE
LYMPHOCYTES NFR BLD: 2.26 K/UL (ref 1.1–3.7)
LYMPHOCYTES RELATIVE PERCENT: 28 % (ref 24–43)
MCH RBC QN AUTO: 30.3 PG (ref 25.2–33.5)
MCHC RBC AUTO-ENTMCNC: 32.3 G/DL (ref 28.4–34.8)
MCV RBC AUTO: 93.9 FL (ref 82.6–102.9)
MONOCYTES NFR BLD: 0.62 K/UL (ref 0.1–1.2)
MONOCYTES NFR BLD: 8 % (ref 3–12)
NEUTROPHILS NFR BLD: 64 % (ref 36–65)
NEUTS SEG NFR BLD: 5.18 K/UL (ref 1.5–8.1)
NITRITE UR QL STRIP: NEGATIVE
NRBC BLD-RTO: 0 PER 100 WBC
PARTIAL THROMBOPLASTIN TIME: 23.7 SEC (ref 23–36.5)
PH UR STRIP: 6.5 [PH] (ref 5–8)
PLATELET # BLD AUTO: 241 K/UL (ref 138–453)
PMV BLD AUTO: 10.6 FL (ref 8.1–13.5)
POTASSIUM SERPL-SCNC: 4 MMOL/L (ref 3.7–5.3)
PROT UR STRIP-MCNC: NEGATIVE MG/DL
PROTHROMBIN TIME: 13.1 SEC (ref 11.7–14.9)
RBC # BLD AUTO: 4.26 M/UL (ref 3.95–5.11)
RBC #/AREA URNS HPF: ABNORMAL /HPF (ref 0–4)
SODIUM SERPL-SCNC: 139 MMOL/L (ref 136–145)
SP GR UR STRIP: 1.01 (ref 1–1.03)
UROBILINOGEN UR STRIP-ACNC: NORMAL EU/DL (ref 0–1)
WBC #/AREA URNS HPF: ABNORMAL /HPF (ref 0–5)
WBC OTHER # BLD: 8.1 K/UL (ref 3.5–11.3)

## 2025-05-01 PROCEDURE — 6360000002 HC RX W HCPCS: Performed by: EMERGENCY MEDICINE

## 2025-05-01 PROCEDURE — 86901 BLOOD TYPING SEROLOGIC RH(D): CPT

## 2025-05-01 PROCEDURE — 94640 AIRWAY INHALATION TREATMENT: CPT

## 2025-05-01 PROCEDURE — 6370000000 HC RX 637 (ALT 250 FOR IP)

## 2025-05-01 PROCEDURE — 6370000000 HC RX 637 (ALT 250 FOR IP): Performed by: EMERGENCY MEDICINE

## 2025-05-01 PROCEDURE — 85730 THROMBOPLASTIN TIME PARTIAL: CPT

## 2025-05-01 PROCEDURE — G0378 HOSPITAL OBSERVATION PER HR: HCPCS

## 2025-05-01 PROCEDURE — 85025 COMPLETE CBC W/AUTO DIFF WBC: CPT

## 2025-05-01 PROCEDURE — 81001 URINALYSIS AUTO W/SCOPE: CPT

## 2025-05-01 PROCEDURE — 80048 BASIC METABOLIC PNL TOTAL CA: CPT

## 2025-05-01 PROCEDURE — 85610 PROTHROMBIN TIME: CPT

## 2025-05-01 PROCEDURE — 2500000003 HC RX 250 WO HCPCS: Performed by: EMERGENCY MEDICINE

## 2025-05-01 PROCEDURE — 71045 X-RAY EXAM CHEST 1 VIEW: CPT

## 2025-05-01 PROCEDURE — 96376 TX/PRO/DX INJ SAME DRUG ADON: CPT

## 2025-05-01 PROCEDURE — 93005 ELECTROCARDIOGRAM TRACING: CPT

## 2025-05-01 PROCEDURE — 84703 CHORIONIC GONADOTROPIN ASSAY: CPT

## 2025-05-01 PROCEDURE — 94760 N-INVAS EAR/PLS OXIMETRY 1: CPT

## 2025-05-01 PROCEDURE — 86850 RBC ANTIBODY SCREEN: CPT

## 2025-05-01 PROCEDURE — 72148 MRI LUMBAR SPINE W/O DYE: CPT

## 2025-05-01 PROCEDURE — 96374 THER/PROPH/DIAG INJ IV PUSH: CPT

## 2025-05-01 PROCEDURE — 51798 US URINE CAPACITY MEASURE: CPT

## 2025-05-01 PROCEDURE — 86900 BLOOD TYPING SEROLOGIC ABO: CPT

## 2025-05-01 PROCEDURE — 36415 COLL VENOUS BLD VENIPUNCTURE: CPT

## 2025-05-01 PROCEDURE — 6360000002 HC RX W HCPCS

## 2025-05-01 RX ORDER — ONDANSETRON 2 MG/ML
4 INJECTION INTRAMUSCULAR; INTRAVENOUS EVERY 6 HOURS PRN
Status: DISCONTINUED | OUTPATIENT
Start: 2025-05-01 | End: 2025-05-02 | Stop reason: HOSPADM

## 2025-05-01 RX ORDER — CYCLOBENZAPRINE HCL 10 MG
10 TABLET ORAL 3 TIMES DAILY
Status: DISCONTINUED | OUTPATIENT
Start: 2025-05-01 | End: 2025-05-02 | Stop reason: HOSPADM

## 2025-05-01 RX ORDER — POTASSIUM CHLORIDE 1500 MG/1
40 TABLET, EXTENDED RELEASE ORAL PRN
Status: DISCONTINUED | OUTPATIENT
Start: 2025-05-01 | End: 2025-05-02 | Stop reason: HOSPADM

## 2025-05-01 RX ORDER — BUPRENORPHINE AND NALOXONE 4; 1 MG/1; MG/1
2 FILM, SOLUBLE BUCCAL; SUBLINGUAL 2 TIMES DAILY
Status: DISCONTINUED | OUTPATIENT
Start: 2025-05-01 | End: 2025-05-02 | Stop reason: HOSPADM

## 2025-05-01 RX ORDER — SODIUM CHLORIDE 9 MG/ML
INJECTION, SOLUTION INTRAVENOUS CONTINUOUS
Status: ACTIVE | OUTPATIENT
Start: 2025-05-01 | End: 2025-05-01

## 2025-05-01 RX ORDER — PREDNISONE 20 MG/1
40 TABLET ORAL DAILY
Status: DISCONTINUED | OUTPATIENT
Start: 2025-05-01 | End: 2025-05-02

## 2025-05-01 RX ORDER — POLYETHYLENE GLYCOL 3350 17 G/17G
17 POWDER, FOR SOLUTION ORAL DAILY PRN
Status: DISCONTINUED | OUTPATIENT
Start: 2025-05-01 | End: 2025-05-02 | Stop reason: HOSPADM

## 2025-05-01 RX ORDER — LEVOTHYROXINE SODIUM 50 UG/1
25 TABLET ORAL DAILY
Status: DISCONTINUED | OUTPATIENT
Start: 2025-05-01 | End: 2025-05-02 | Stop reason: HOSPADM

## 2025-05-01 RX ORDER — ENOXAPARIN SODIUM 100 MG/ML
40 INJECTION SUBCUTANEOUS DAILY
Status: DISCONTINUED | OUTPATIENT
Start: 2025-05-01 | End: 2025-05-02 | Stop reason: HOSPADM

## 2025-05-01 RX ORDER — GABAPENTIN 300 MG/1
300 CAPSULE ORAL 3 TIMES DAILY
Status: DISCONTINUED | OUTPATIENT
Start: 2025-05-01 | End: 2025-05-02 | Stop reason: HOSPADM

## 2025-05-01 RX ORDER — ACETAMINOPHEN 500 MG
1000 TABLET ORAL EVERY 6 HOURS SCHEDULED
Status: DISCONTINUED | OUTPATIENT
Start: 2025-05-01 | End: 2025-05-02 | Stop reason: HOSPADM

## 2025-05-01 RX ORDER — SODIUM CHLORIDE 0.9 % (FLUSH) 0.9 %
5-40 SYRINGE (ML) INJECTION PRN
Status: DISCONTINUED | OUTPATIENT
Start: 2025-05-01 | End: 2025-05-02 | Stop reason: HOSPADM

## 2025-05-01 RX ORDER — ONDANSETRON 4 MG/1
4 TABLET, ORALLY DISINTEGRATING ORAL EVERY 8 HOURS PRN
Status: DISCONTINUED | OUTPATIENT
Start: 2025-05-01 | End: 2025-05-02 | Stop reason: HOSPADM

## 2025-05-01 RX ORDER — KETOROLAC TROMETHAMINE 15 MG/ML
15 INJECTION, SOLUTION INTRAMUSCULAR; INTRAVENOUS EVERY 6 HOURS
Status: DISCONTINUED | OUTPATIENT
Start: 2025-05-01 | End: 2025-05-02

## 2025-05-01 RX ORDER — ALBUTEROL SULFATE 0.83 MG/ML
2.5 SOLUTION RESPIRATORY (INHALATION)
Status: DISCONTINUED | OUTPATIENT
Start: 2025-05-01 | End: 2025-05-02 | Stop reason: HOSPADM

## 2025-05-01 RX ORDER — SODIUM CHLORIDE 0.9 % (FLUSH) 0.9 %
5-40 SYRINGE (ML) INJECTION EVERY 12 HOURS SCHEDULED
Status: DISCONTINUED | OUTPATIENT
Start: 2025-05-01 | End: 2025-05-02 | Stop reason: HOSPADM

## 2025-05-01 RX ORDER — ACETAMINOPHEN 325 MG/1
650 TABLET ORAL EVERY 6 HOURS PRN
Status: DISCONTINUED | OUTPATIENT
Start: 2025-05-01 | End: 2025-05-01

## 2025-05-01 RX ORDER — ALBUTEROL SULFATE 0.83 MG/ML
2.5 SOLUTION RESPIRATORY (INHALATION)
Status: DISCONTINUED | OUTPATIENT
Start: 2025-05-01 | End: 2025-05-01

## 2025-05-01 RX ORDER — MAGNESIUM SULFATE IN WATER 40 MG/ML
2000 INJECTION, SOLUTION INTRAVENOUS PRN
Status: DISCONTINUED | OUTPATIENT
Start: 2025-05-01 | End: 2025-05-02 | Stop reason: HOSPADM

## 2025-05-01 RX ORDER — CYCLOBENZAPRINE HCL 10 MG
10 TABLET ORAL 3 TIMES DAILY PRN
Status: DISCONTINUED | OUTPATIENT
Start: 2025-05-01 | End: 2025-05-01

## 2025-05-01 RX ORDER — POTASSIUM CHLORIDE 7.45 MG/ML
10 INJECTION INTRAVENOUS PRN
Status: DISCONTINUED | OUTPATIENT
Start: 2025-05-01 | End: 2025-05-02 | Stop reason: HOSPADM

## 2025-05-01 RX ORDER — ACETAMINOPHEN 650 MG/1
650 SUPPOSITORY RECTAL EVERY 6 HOURS PRN
Status: DISCONTINUED | OUTPATIENT
Start: 2025-05-01 | End: 2025-05-01

## 2025-05-01 RX ORDER — SODIUM CHLORIDE 9 MG/ML
INJECTION, SOLUTION INTRAVENOUS PRN
Status: DISCONTINUED | OUTPATIENT
Start: 2025-05-01 | End: 2025-05-02 | Stop reason: HOSPADM

## 2025-05-01 RX ADMIN — ALBUTEROL SULFATE 2.5 MG: 2.5 SOLUTION RESPIRATORY (INHALATION) at 20:54

## 2025-05-01 RX ADMIN — SODIUM CHLORIDE, PRESERVATIVE FREE 10 ML: 5 INJECTION INTRAVENOUS at 14:08

## 2025-05-01 RX ADMIN — CYCLOBENZAPRINE 10 MG: 10 TABLET, FILM COATED ORAL at 07:54

## 2025-05-01 RX ADMIN — CYCLOBENZAPRINE 10 MG: 10 TABLET, FILM COATED ORAL at 14:04

## 2025-05-01 RX ADMIN — KETOROLAC TROMETHAMINE 15 MG: 15 INJECTION, SOLUTION INTRAMUSCULAR; INTRAVENOUS at 14:05

## 2025-05-01 RX ADMIN — SODIUM CHLORIDE, PRESERVATIVE FREE 10 ML: 5 INJECTION INTRAVENOUS at 20:13

## 2025-05-01 RX ADMIN — LEVOTHYROXINE SODIUM 25 MCG: 0.05 TABLET ORAL at 07:54

## 2025-05-01 RX ADMIN — GABAPENTIN 300 MG: 300 CAPSULE ORAL at 14:04

## 2025-05-01 RX ADMIN — CYCLOBENZAPRINE 10 MG: 10 TABLET, FILM COATED ORAL at 20:11

## 2025-05-01 RX ADMIN — ACETAMINOPHEN 1000 MG: 500 TABLET ORAL at 18:28

## 2025-05-01 RX ADMIN — SODIUM CHLORIDE, PRESERVATIVE FREE 10 ML: 5 INJECTION INTRAVENOUS at 08:00

## 2025-05-01 RX ADMIN — PREDNISONE 40 MG: 20 TABLET ORAL at 09:22

## 2025-05-01 RX ADMIN — GABAPENTIN 300 MG: 300 CAPSULE ORAL at 09:22

## 2025-05-01 RX ADMIN — GABAPENTIN 300 MG: 300 CAPSULE ORAL at 20:11

## 2025-05-01 RX ADMIN — BUPRENORPHINE AND NALOXONE 2 FILM: 4; 1 FILM, SOLUBLE BUCCAL; SUBLINGUAL at 20:11

## 2025-05-01 RX ADMIN — ACETAMINOPHEN 1000 MG: 500 TABLET ORAL at 23:49

## 2025-05-01 RX ADMIN — BUPRENORPHINE AND NALOXONE 2 FILM: 4; 1 FILM, SOLUBLE BUCCAL; SUBLINGUAL at 07:54

## 2025-05-01 RX ADMIN — KETOROLAC TROMETHAMINE 15 MG: 15 INJECTION, SOLUTION INTRAMUSCULAR; INTRAVENOUS at 08:00

## 2025-05-01 RX ADMIN — ACETAMINOPHEN 1000 MG: 500 TABLET ORAL at 14:04

## 2025-05-01 RX ADMIN — KETOROLAC TROMETHAMINE 15 MG: 15 INJECTION, SOLUTION INTRAMUSCULAR; INTRAVENOUS at 20:12

## 2025-05-01 RX ADMIN — KETOROLAC TROMETHAMINE 15 MG: 15 INJECTION, SOLUTION INTRAMUSCULAR; INTRAVENOUS at 23:49

## 2025-05-01 RX ADMIN — ALBUTEROL SULFATE 2.5 MG: 2.5 SOLUTION RESPIRATORY (INHALATION) at 14:15

## 2025-05-01 ASSESSMENT — PAIN SCALES - GENERAL
PAINLEVEL_OUTOF10: 7
PAINLEVEL_OUTOF10: 9
PAINLEVEL_OUTOF10: 7
PAINLEVEL_OUTOF10: 9
PAINLEVEL_OUTOF10: 9
PAINLEVEL_OUTOF10: 7
PAINLEVEL_OUTOF10: 6

## 2025-05-01 ASSESSMENT — ENCOUNTER SYMPTOMS
BACK PAIN: 1
DIARRHEA: 0
VOMITING: 0
SHORTNESS OF BREATH: 0
ABDOMINAL PAIN: 0

## 2025-05-01 ASSESSMENT — PAIN DESCRIPTION - LOCATION
LOCATION: BACK
LOCATION: BACK;LEG

## 2025-05-01 ASSESSMENT — PAIN DESCRIPTION - ORIENTATION: ORIENTATION: RIGHT

## 2025-05-01 NOTE — ED NOTES
ED to inpatient nurses report      Chief Complaint:  Chief Complaint   Patient presents with    Back Pain    Leg Pain     right     Present to ED from: Home     MOA:     LOC: alert and orientated to name, place, date  Mobility: Requires assistance * 1, uses walker right now  Oxygen Baseline: RA    Current needs required: none    Pending ED orders: none   Present condition: stable     Why did the patient come to the ED? Pt arrived to ED for back and leg pain  Pt states that she has sciatica and can not tolerate the pain  Pt seen 2 days ago for same thing in ED  Pt states that she sees neurosurgery for this  Pt suppose to be going to PT as well but has no showed   Pt states that the pain is making it difficult to ambulate   Breathing is non labored and no acute distress is noted.   Pt resting on stretcher, call light within reach.white board updated   What is the plan? Admit to obs   Any procedures or intervention occur? CT   Any safety concerns??    Mental Status:  Level of Consciousness: Alert (0)    Psych Assessment:   Psychosocial  Psychosocial (WDL): Within Defined Limits  Vital signs   Vitals:    04/30/25 2042   BP: (!) 145/99   Pulse: 99   Resp: 17   Temp: 97.9 °F (36.6 °C)   TempSrc: Oral   SpO2: 94%        Vitals:  Patient Vitals for the past 24 hrs:   BP Temp Temp src Pulse Resp SpO2   04/30/25 2042 (!) 145/99 97.9 °F (36.6 °C) Oral 99 17 94 %      Visit Vitals  BP (!) 145/99   Pulse 99   Temp 97.9 °F (36.6 °C) (Oral)   Resp 17   SpO2 94%        LDAs:      Ambulatory Status:  Presents to emergency department  because of falls (Syncope, seizure, or loss of consciousness): No, Age > 70: No, Altered Mental Status, Intoxication with alcohol or substance confusion (Disorientation, impaired judgment, poor safety awaremess, or inability to follow instructions): No, Impaired Mobility: Ambulates or transfers with assistive devices or assistance; Unable to ambulate or transer.: Yes, Nursing Judgement:     CHOLECYSTECTOMY LAPAROSCOPIC ROBOTIC MULTI PORT performed by Mack Mancia MD at [facility] OR    UPPER GASTROINTESTINAL ENDOSCOPY N/A 07/27/2018    [facility]--FRAGMENTS OF GASTRIC ANTRAL MUCOSA W/ CHRONIC REACTIVE GASTROPATHY/CHEMICAL GASTRITIS     UPPER GASTROINTESTINAL ENDOSCOPY N/A 10/14/2022    EGD BIOPSY performed by Anthony Claros MD at [facility] ENDO       PAST MEDICAL HISTORY       Past Medical History:   Diagnosis Date    Abdominal pain 07/25/2018    Acute cystitis without hematuria 06/24/2018    Acute laryngitis 04/06/2018    Anorexia 04/06/2017    Anxiety     Anxiety attack 06/25/2018    Asthma     Backache 01/26/2017    Bipolar disorder (HCC)     Cervical radiculopathy 07/25/2017    Chest congestion 04/06/2018    Chronic back pain     Chronic bronchitis (HCC) 04/06/2017    Cocaine abuse (HCC) 07/25/2018    Complex cyst of left ovary 07/25/2018    COPD (chronic obstructive pulmonary disease) ([facility])     Depression     Dysfunctional gallbladder 06/24/2018    Exposure to influenza 04/06/2018    Fatigue 06/15/2013    GERD (gastroesophageal reflux disease) 10/02/2017    Heart palpitations 10/28/2015    History of non anemic vitamin B12 deficiency 08/30/2017    Hoarseness, chronic 06/19/2017    Hypokalemia 06/25/2018    Insomnia 10/28/2015    Intractable vomiting with nausea     Nausea 04/06/2018    Neck pain 01/26/2017    Other constipation 06/27/2018    Ovarian cyst     Panic attacks 07/25/2017    Poor venous access     Productive cough 01/26/2017    Restless legs syndrome 1990    Right upper quadrant abdominal pain 06/25/2018    RUQ abdominal pain     Severe malnutrition 06/24/2018    Thyroid disease     Tobacco use disorder 01/26/2017    Wears dentures     uppers and lowers    Weight loss 10/02/2017       Labs:  Labs Reviewed - No data to display    Electronically signed by Susan Ellis RN on 5/1/2025 at 12:54 AM

## 2025-05-01 NOTE — ED PROVIDER NOTES
[facility] EMERGENCY DEPARTMENT     Emergency Department     Faculty Attestation        I performed a history and physical examination of the patient and discussed management with the resident. I reviewed the resident’s note and agree with the documented findings and plan of care. Any areas of disagreement are noted on the chart. I was personally present for the key portions of any procedures. I have documented in the chart those procedures where I was not present during the key portions. I have reviewed the emergency nurses triage note. I agree with the chief complaint, past medical history, past surgical history, allergies, medications, social and family history as documented unless otherwise noted below.  For Physician Assistant/ Nurse Practitioner cases/documentation I have personally evaluated this patient and have completed at least one if not all key elements of the E/M (history, physical exam, and MDM). Additional findings are as noted.      Vital Signs: BP: (!) 145/99  Pulse: 99  Respirations: 17  Temp: 97.9 °F (36.6 °C) SpO2: 94 %  PCP:  Dario Michelle APRN - CNP  Note Started: 4/30/25, 10:02 PM EDT    Pertinent Comments:     Patient is a 55-year-old female who has known longstanding sciatica affecting her right low back and leg.   Does have history of drug abuse in the past but never IV drugs and denies any fevers or chills.   Denies any new weakness but has pain with use of the leg as well as movement.    Denies any loss of bowel or bladder function or any numbness/tingling in extremities or perineal numbness.   No weakness associated but movement makes the pain much worse and has been limiting her ability to ambulate well.    On examination lungs are clear to station bilateral with midline trachea heart regular rate and rhythm to slightly tachycardic but no systolic ejection murmur.   Abdomen is soft/nontender.   Bilateral lower extremities have strong

## 2025-05-01 NOTE — H&P
[facility]  CDU / OBSERVATION ENCOUNTER  Physician NOTE     Pt Name: Elizabeth Alex  MRN: [id number]  Birthdate 1970  Date of evaluation: 5/1/25  Patient's PCP is :  Dario Michelle APRN - CNP    CHIEF COMPLAINT       Chief Complaint   Patient presents with    Back Pain    Leg Pain     right         HISTORY OF PRESENT ILLNESS   Elizabeth Alex is a 55 y.o. female who presented to the ED with c/o right lower back pain radiating down right leg which began a few days prior to arrival.  Of note, patient was evaluated on 4/28 for her pain and was started on muscle relaxers and a prednisone course.  Patient has been taking them as prescribed without significant relief.  She presented to the ED on 4/30 given persistent symptoms.  CT scan shows right subarticular disc extrusion at L4-5.  MRI was recommended.  Patient was admitted to the observation service for further evaluation and treatment.    Location/Symptom: Right lower back pain  Timing/Onset: A few days prior to arrival  Provocation: N/A  Quality: Sharp  Radiation: Radiates to right lower extremity  Severity: Severe  Timing/Duration: Constant  Modifying Factors: N/A    History was obtained in part through review of the ED chart. When possible, a direct discussion was had with ED nurses, residents, and attendings  REVIEW OF SYSTEMS     Review of Systems   Constitutional:  Negative for fever.   HENT:  Negative for congestion.    Respiratory:  Negative for shortness of breath.    Cardiovascular:  Negative for chest pain.   Gastrointestinal:  Negative for abdominal pain, diarrhea and vomiting.   Musculoskeletal:  Positive for back pain.   Neurological:  Negative for headaches.         (PQRS) Advance directives on face sheet per hospital policy. No change unless specifically mentioned in chart    PAST MEDICAL HISTORY    has a past medical history of Abdominal pain, Acute cystitis without hematuria, Acute laryngitis, Anorexia, Anxiety, Anxiety attack,  normal.           DIFFERENTIAL DIAGNOSIS/MDM:     FROM ED MEDICAL DECISION MAKING NOTE:   Wed Apr 30, 2025   2233 Reassessed patient, she is still complaining of significant lumbar pain.  Will obtain CT lumbar spine. [SD]   Thu May 01, 2025   0036 Reassessed patient, she is still complaining of lumbar pain.  Discussed CT imaging results with her.  Will admit patient to the observation unit for an MRI as well as neurosurgery evaluation as patient is still very uncomfortable and does not feel safe going home due to the pain.  Patient agreeable with MRI of lumbar spine and neurosurgery evaluation as well as admission to the observation unit. [SD]   0037 CT LUMBAR SPINE WO CONTRAST  IMPRESSION:  1.  No acute osseous abnormality of the lumbar spine.     2.  Very large right subarticular disc extrusion versus sequestration of at  L4-L5 that effaces the right lateral recess.  Finding is new from comparison  CT 06/23/2018 and not well characterized on CT.  Consider MRI and  neurosurgical consultation.   [SD]       DIAGNOSTIC RESULTS     RADIOLOGY:   I directly visualized the following  images and reviewed the radiologist interpretations:    XR CHEST PORTABLE  Result Date: 5/1/2025  EXAMINATION: ONE XRAY VIEW OF THE CHEST 5/1/2025 5:07 am COMPARISON: 08/05/2023 HISTORY: ORDERING SYSTEM PROVIDED HISTORY: preop eval TECHNOLOGIST PROVIDED HISTORY: preop eval Reason for Exam: port upright FINDINGS: Patient tilted and rotated to the left.  Normal cardiomediastinal silhouette. No focal consolidation.  No pulmonary edema.  No pleural effusion or pneumothorax.  No acute bony abnormality.     Suboptimal positioning of the patient.  No focal consolidation.     CT LUMBAR SPINE WO CONTRAST  Result Date: 5/1/2025  EXAMINATION: CT OF THE LUMBAR SPINE WITHOUT CONTRAST  4/30/2025 TECHNIQUE: CT of the lumbar spine was performed without the administration of intravenous contrast. Multiplanar reformatted images are provided for review.

## 2025-05-01 NOTE — PLAN OF CARE
Problem: Pain  Goal: Verbalizes/displays adequate comfort level or baseline comfort level  5/1/2025 1448 by Luz Montilla RN  Outcome: Adequate for Discharge  5/1/2025 0153 by Alessia Gates RN  Outcome: Progressing     Problem: Discharge Planning  Goal: Discharge to home or other facility with appropriate resources  5/1/2025 1448 by Luz Montilla RN  Outcome: Adequate for Discharge  5/1/2025 0153 by Alessia Gates RN  Outcome: Progressing     Problem: Safety - Adult  Goal: Free from fall injury  Outcome: Adequate for Discharge

## 2025-05-01 NOTE — PROGRESS NOTES
[facility]  CDU / OBSERVATION ENCOUNTER  ATTENDING NOTE         Patient requiring emergent OR intervention.      According to the American College of surgeons surgical risk calculator this patient has slightly high risk of complication due to history of smoking and COPD.  Patient however does not have active systemic disease.  Patient has a normal EKG and normal kidney function.      Given the nature of her pathology and the lack of other active problems the risk/benefit balance is clearly in favor proceeding with operative intervention to maintain neurologic function.    Patient is considered medically cleared to proceed with procedure     Marco Curiel MD  Attending Emergency  Physician

## 2025-05-01 NOTE — PROGRESS NOTES
[facility]  CDU / OBSERVATION ENCOUNTER  ATTENDING NOTE         I performed a history and physical examination of the patient and discussed management with the resident or midlevel provider. I reviewed the resident or midlevel provider's note and agree with the documented findings and plan of care. Any areas of disagreement are noted on the chart. I was personally present for the key portions of any procedures. I have documented in the chart those procedures where I was not present during the key portions. I have reviewed the nurses notes. I agree with the chief complaint, past medical history, past surgical history, allergies, medications, social and family history as documented unless otherwise noted below.    The Family history, social history, and ROS are effectively unchanged since admission unless noted elsewhere in the chart.     This patient was placed in the observation unit for reevaluation for possible admission to the hospital     Patient with significant spinal cord compression and disc herniation.  Patient with radiculopathy.  Patient with imaging showing anatomic dysfunction.  Patient requiring operative decompression.  Patient to OR this afternoon per neurosurgery.       Marco Curiel MD  Attending Emergency  Physician

## 2025-05-01 NOTE — CARE COORDINATION
Case Management Assessment  Initial Evaluation    Date/Time of Evaluation: 5/1/2025 3:23 PM  Assessment Completed by: SADIE CEVALLOS RN    If patient is discharged prior to next notation, then this note serves as note for discharge by case management.    Patient Name: Elizabeth Alex                   YOB: 1970  Diagnosis: Lumbar radiculopathy [M54.16]  Lumbar pain [M54.50]                   Date / Time: 4/30/2025  9:18 PM    Patient Admission Status: Observation   Readmission Risk (Low < 19, Mod (19-27), High > 27): No data recorded  Current PCP: Dario Michelle APRN - CNP  PCP verified by CM? Yes    Chart Reviewed: Yes      History Provided by: Patient  Patient Orientation: Alert and Oriented    Patient Cognition: Alert    Hospitalization in the last 30 days (Readmission):  No    If yes, Readmission Assessment in CM Navigator will be completed.    Advance Directives:      Code Status: Full Code   Patient's Primary Decision Maker is:        Discharge Planning:    Patient lives with: Children, Spouse/Significant Other Type of Home: House  Primary Care Giver: Self  Patient Support Systems include: Spouse/Significant Other   Current Financial resources:    Current community resources:    Current services prior to admission: None            Current DME:              Type of Home Care services:  None    ADLS  Prior functional level: Independent in ADLs/IADLs  Current functional level: Independent in ADLs/IADLs    PT AM-PAC:   /24  OT AM-PAC:   /24    Family can provide assistance at DC: Yes  Would you like Case Management to discuss the discharge plan with any other family members/significant others, and if so, who? No  Plans to Return to Present Housing: Yes  Other Identified Issues/Barriers to RETURNING to current housing: none  Potential Assistance needed at discharge: N/A            Potential DME:    Patient expects to discharge to: House  Plan for transportation at discharge:      Financial    Payor:

## 2025-05-01 NOTE — PROGRESS NOTES
A Bladder Scan was attempted. Patient kept moving and the machine read 1ml. Patient has not urinated since RN's 0700hrs   Yuliana Tidwell advised

## 2025-05-01 NOTE — ED NOTES
Pt arrived to ED for back and leg pain  Pt states that she has sciatica and can not tolerate the pain  Pt seen 2 days ago for same thing in ED  Pt states that she sees neurosurgery for this  Pt suppose to be going to PT as well but has no showed   Pt states that the pain is making it difficult to ambulate   Breathing is non labored and no acute distress is noted.   Pt resting on stretcher, call light within reach.white board updated

## 2025-05-01 NOTE — TELEPHONE ENCOUNTER
Attempted contacting patient, left voicemail to inform a referral for PT closer to home was placed.

## 2025-05-01 NOTE — ED PROVIDER NOTES
[facility] OBSERVATION UNIT  Emergency Department Encounter  Emergency Medicine Resident     Pt Name:Elizabeth Alex  MRN: [id number]  Birthdate 1970  Date of evaluation: 4/30/25  PCP:  Dario Michelle APRN - CNP  Note Started: 10:16 PM EDT      CHIEF COMPLAINT       Chief Complaint   Patient presents with    Back Pain    Leg Pain     right       HISTORY OF PRESENT ILLNESS  (Location/Symptom, Timing/Onset, Context/Setting, Quality, Duration, Modifying Factors, Severity.)      Elizabeth Alex is a 55 y.o. female who presents with right lumbar pain radiating down the right leg.  Patient states she has a history of sciatica.  Patient stated that she had a fall in February on ice causing worsening of her sciatic symptoms.  Patient states that she was seen here on 4/28/2025 due to this pain and was started on prednisone.  Patient stated that she was given a prescription for prednisone and Flexeril.  Patient states she has been taking the medications as prescribed but is still having persistent pain therefore she came back to the emergency department to be further evaluated.  Patient denies any new trauma.  Patient states that she used to be on gabapentin in the past however has not taken it due to running out of her medication and not getting it refilled.  Patient states that she saw a neurologist who referred her for physical therapy and aqua therapy.  Patient states that she is having difficulty sleeping due to the pain therefore she came to the emergency department to be further evaluated.  Patient denies IV drug use, difficulty ambulating, changes in urinary or bowel habits, saddle paresthesia.    PAST MEDICAL / SURGICAL / SOCIAL / FAMILY HISTORY      has a past medical history of Abdominal pain, Acute cystitis without hematuria, Acute laryngitis, Anorexia, Anxiety, Anxiety attack, Asthma, Backache, Bipolar disorder ([facility]), Cervical radiculopathy, Chest congestion, Chronic back pain, Chronic bronchitis ([facility]), Cocaine abuse

## 2025-05-01 NOTE — PLAN OF CARE
Plan of care:    Patient was seen and examined this morning by myself and Dr Quevedo. Patient continues to complaining of lumbar pain and radiculaphy to the RLE. On exam she has  right sided dorsiflexion weakness and right foot numbness. She states she has been using a rolling walker for the past several days, and fell yesterday prior to admission.    Continue pt NPO, plan for OR today depending on schedule, she will need medicine clearance for surgery

## 2025-05-01 NOTE — CONSULTS
Department of Neurosurgery                                            Nurse Practitioner Consult Note      Reason for Consult:  Lumbar pain, MRI pending, CT showed very large right subarticular disc extrusion vs sequestration at L4-L5   Requesting Physician:  Anthony  Neurosurgeon:   [x] Dr. Quevedo  [] Dr. Leary  [] Dr. Wheeler  [] Dr. Samuels    Neurosurgery notified of consult no notification   Neurosurgery arrival to bedside 0400    History Obtained From:  patient    CHIEF COMPLAINT:         Chief Complaint   Patient presents with    Back Pain    Leg Pain     right       HISTORY OF PRESENT ILLNESS:       The patient is a 55 y.o. female who presents to the emergency department with complaints of lower back pain, she reports a longstanding history of sciatica affecting her right low back and leg.  Patient does have a history of drug abuse and is currently utilizing Suboxone.  Patient was seen in our neurosurgery office by the NP for radiating neck and low back pain.  X-rays of the entire spine were ordered as well as a referral to physical therapy and patient was to return to the office in 10 to 12 weeks to evaluate the response of therapy.  Patient was seen on 4/28/2025 with the same symptoms in the emergency department was started on prednisone and Flexeril.  She reports that she has been taking her medications but has continued to have persistent pain and wanted further evaluation.  CT of the lumbar spine was obtained that showed a very large right subarticular disc extrusion versus sequestration of at L4-L5 that effaces the right lateral recess.  Neurosurgery has been asked to evaluate the patient.  Patient reports that just over the last few weeks she has had to utilize a rolling walker and/or cane to ambulate.  She states that she is unable to stand on her right leg pain will shoot across to her anterior thigh into her shin.  She denies any bowel or bladder issues.  Sensation is intact  Patient does  04/22/2025 02:35 PM    BUN 25 04/22/2025 02:35 PM    CREATININE 0.7 04/22/2025 02:35 PM    CALCIUM 9.2 04/22/2025 02:35 PM    GFRAA >60 04/08/2022 01:27 PM    LABGLOM >90 04/22/2025 02:35 PM    LABGLOM >60 08/05/2023 06:05 PM    GLUCOSE 93 04/22/2025 02:35 PM       Radiology Review:      CT LUMBAR SPINE WO CONTRAST   Final Result   1.  No acute osseous abnormality of the lumbar spine.      2.  Very large right subarticular disc extrusion versus sequestration of at   L4-L5 that effaces the right lateral recess.  Finding is new from comparison   CT 06/23/2018 and not well characterized on CT.  Consider MRI and   neurosurgical consultation.         MRI LUMBAR SPINE WO CONTRAST    (Results Pending)          ASSESSMENT AND PLAN:       Patient Active Problem List   Diagnosis    Heart palpitations    Insomnia    GERD (gastroesophageal reflux disease)    Hypokalemia    Anxiety attack    Asthma    Backache    Cocaine abuse (HCC)    Complex cyst of left ovary    COPD (chronic obstructive pulmonary disease) (HCC)    Depression    History of non anemic vitamin B12 deficiency    Hoarseness, chronic    Panic attacks    Tobacco use disorder    Dyspepsia    Lumbar pain         A/P:  This is a 55 y.o. female with longstanding right sided sciatica with acute on chronic low back pain with large right subarticular disc extrusion at L4-5 that effaces the right lateral recess.  Patient care will be discussed with attending, will reevaluated patient along with attending.      - Neurosurgical intervention pending MRI imaging and evaluation  by attending neurosurgeon     - No HOB restrictions    - Obtain MRI lumbar spine    - Neuro checks per protocol      Additional recommendations may follow once MRI obtained     Please contact neurosurgery with any changes in patients neurologic status.     Thank you for your consult.       LISANDRA Rooney - [facility]     Neuroscience [place]   5/1/2025  3:45 AM

## 2025-05-01 NOTE — PROGRESS NOTES
Physical Therapy Cancel Note      DATE: 2025    NAME: Elizabeth Alex  MRN: [id number]   : 1970      Patient not seen this date for Physical Therapy due to:    Other: await neurosurgery plan; pt with acute back pain; check       Electronically signed by Ismael Bo PT on 2025 at 10:20 AM

## 2025-05-02 ENCOUNTER — ANESTHESIA EVENT (OUTPATIENT)
Dept: OPERATING ROOM | Age: 55
End: 2025-05-02
Payer: MEDICAID

## 2025-05-02 ENCOUNTER — APPOINTMENT (OUTPATIENT)
Dept: GENERAL RADIOLOGY | Age: 55
End: 2025-05-02
Payer: MEDICAID

## 2025-05-02 ENCOUNTER — ANESTHESIA (OUTPATIENT)
Dept: OPERATING ROOM | Age: 55
End: 2025-05-02
Payer: MEDICAID

## 2025-05-02 VITALS
RESPIRATION RATE: 18 BRPM | HEART RATE: 76 BPM | DIASTOLIC BLOOD PRESSURE: 75 MMHG | OXYGEN SATURATION: 96 % | SYSTOLIC BLOOD PRESSURE: 126 MMHG | TEMPERATURE: 97.6 F

## 2025-05-02 LAB
EKG ATRIAL RATE: 55 BPM
EKG P-R INTERVAL: 108 MS
EKG Q-T INTERVAL: 428 MS
EKG QRS DURATION: 80 MS
EKG QTC CALCULATION (BAZETT): 409 MS
EKG R AXIS: 81 DEGREES
EKG T AXIS: 65 DEGREES
EKG VENTRICULAR RATE: 55 BPM

## 2025-05-02 PROCEDURE — G0378 HOSPITAL OBSERVATION PER HR: HCPCS

## 2025-05-02 PROCEDURE — 3700000001 HC ADD 15 MINUTES (ANESTHESIA): Performed by: NEUROLOGICAL SURGERY

## 2025-05-02 PROCEDURE — 6370000000 HC RX 637 (ALT 250 FOR IP): Performed by: PHYSICIAN ASSISTANT

## 2025-05-02 PROCEDURE — 2500000003 HC RX 250 WO HCPCS

## 2025-05-02 PROCEDURE — 6360000002 HC RX W HCPCS

## 2025-05-02 PROCEDURE — 2500000003 HC RX 250 WO HCPCS: Performed by: NEUROLOGICAL SURGERY

## 2025-05-02 PROCEDURE — APPNB15 APP NON BILLABLE TIME 0-15 MINS

## 2025-05-02 PROCEDURE — 3700000000 HC ANESTHESIA ATTENDED CARE: Performed by: NEUROLOGICAL SURGERY

## 2025-05-02 PROCEDURE — 7100000000 HC PACU RECOVERY - FIRST 15 MIN: Performed by: NEUROLOGICAL SURGERY

## 2025-05-02 PROCEDURE — 6370000000 HC RX 637 (ALT 250 FOR IP): Performed by: NEUROLOGICAL SURGERY

## 2025-05-02 PROCEDURE — 3600000014 HC SURGERY LEVEL 4 ADDTL 15MIN: Performed by: NEUROLOGICAL SURGERY

## 2025-05-02 PROCEDURE — 93010 ELECTROCARDIOGRAM REPORT: CPT | Performed by: INTERNAL MEDICINE

## 2025-05-02 PROCEDURE — 7100000001 HC PACU RECOVERY - ADDTL 15 MIN: Performed by: NEUROLOGICAL SURGERY

## 2025-05-02 PROCEDURE — 3600000004 HC SURGERY LEVEL 4 BASE: Performed by: NEUROLOGICAL SURGERY

## 2025-05-02 PROCEDURE — 2720000010 HC SURG SUPPLY STERILE: Performed by: NEUROLOGICAL SURGERY

## 2025-05-02 PROCEDURE — 2709999900 HC NON-CHARGEABLE SUPPLY: Performed by: NEUROLOGICAL SURGERY

## 2025-05-02 PROCEDURE — 6360000002 HC RX W HCPCS: Performed by: PHYSICIAN ASSISTANT

## 2025-05-02 PROCEDURE — 6370000000 HC RX 637 (ALT 250 FOR IP): Performed by: ANESTHESIOLOGY

## 2025-05-02 PROCEDURE — 2580000003 HC RX 258

## 2025-05-02 RX ORDER — SODIUM CHLORIDE 9 MG/ML
INJECTION, SOLUTION INTRAVENOUS PRN
Status: DISCONTINUED | OUTPATIENT
Start: 2025-05-02 | End: 2025-05-02 | Stop reason: HOSPADM

## 2025-05-02 RX ORDER — IPRATROPIUM BROMIDE AND ALBUTEROL SULFATE 2.5; .5 MG/3ML; MG/3ML
1 SOLUTION RESPIRATORY (INHALATION) ONCE
Status: COMPLETED | OUTPATIENT
Start: 2025-05-02 | End: 2025-05-02

## 2025-05-02 RX ORDER — PROPOFOL 10 MG/ML
INJECTION, EMULSION INTRAVENOUS
Status: DISCONTINUED | OUTPATIENT
Start: 2025-05-02 | End: 2025-05-02 | Stop reason: SDUPTHER

## 2025-05-02 RX ORDER — SODIUM CHLORIDE 0.9 % (FLUSH) 0.9 %
5-40 SYRINGE (ML) INJECTION PRN
Status: DISCONTINUED | OUTPATIENT
Start: 2025-05-02 | End: 2025-05-02 | Stop reason: HOSPADM

## 2025-05-02 RX ORDER — ONDANSETRON 2 MG/ML
INJECTION INTRAMUSCULAR; INTRAVENOUS
Status: DISCONTINUED | OUTPATIENT
Start: 2025-05-02 | End: 2025-05-02 | Stop reason: SDUPTHER

## 2025-05-02 RX ORDER — LIDOCAINE HYDROCHLORIDE 10 MG/ML
INJECTION, SOLUTION EPIDURAL; INFILTRATION; INTRACAUDAL; PERINEURAL
Status: DISCONTINUED | OUTPATIENT
Start: 2025-05-02 | End: 2025-05-02 | Stop reason: SDUPTHER

## 2025-05-02 RX ORDER — DIPHENHYDRAMINE HYDROCHLORIDE 50 MG/ML
12.5 INJECTION, SOLUTION INTRAMUSCULAR; INTRAVENOUS
Status: DISCONTINUED | OUTPATIENT
Start: 2025-05-02 | End: 2025-05-02 | Stop reason: HOSPADM

## 2025-05-02 RX ORDER — TRAMADOL HYDROCHLORIDE 50 MG/1
50 TABLET ORAL EVERY 6 HOURS PRN
Qty: 28 TABLET | Refills: 0 | Status: SHIPPED | OUTPATIENT
Start: 2025-05-02 | End: 2025-05-09

## 2025-05-02 RX ORDER — SODIUM CHLORIDE 0.9 % (FLUSH) 0.9 %
5-40 SYRINGE (ML) INJECTION EVERY 12 HOURS SCHEDULED
Status: DISCONTINUED | OUTPATIENT
Start: 2025-05-02 | End: 2025-05-02 | Stop reason: HOSPADM

## 2025-05-02 RX ORDER — GLYCOPYRROLATE 0.2 MG/ML
INJECTION INTRAMUSCULAR; INTRAVENOUS
Status: DISCONTINUED | OUTPATIENT
Start: 2025-05-02 | End: 2025-05-02 | Stop reason: SDUPTHER

## 2025-05-02 RX ORDER — MAGNESIUM HYDROXIDE 1200 MG/15ML
LIQUID ORAL CONTINUOUS PRN
Status: COMPLETED | OUTPATIENT
Start: 2025-05-02 | End: 2025-05-02

## 2025-05-02 RX ORDER — MEPERIDINE HYDROCHLORIDE 50 MG/ML
12.5 INJECTION INTRAMUSCULAR; INTRAVENOUS; SUBCUTANEOUS EVERY 5 MIN PRN
Status: DISCONTINUED | OUTPATIENT
Start: 2025-05-02 | End: 2025-05-02 | Stop reason: HOSPADM

## 2025-05-02 RX ORDER — ROCURONIUM BROMIDE 10 MG/ML
INJECTION, SOLUTION INTRAVENOUS
Status: DISCONTINUED | OUTPATIENT
Start: 2025-05-02 | End: 2025-05-02 | Stop reason: SDUPTHER

## 2025-05-02 RX ORDER — DROPERIDOL 2.5 MG/ML
0.62 INJECTION, SOLUTION INTRAMUSCULAR; INTRAVENOUS
Status: DISCONTINUED | OUTPATIENT
Start: 2025-05-02 | End: 2025-05-02 | Stop reason: HOSPADM

## 2025-05-02 RX ORDER — DEXAMETHASONE SODIUM PHOSPHATE 10 MG/ML
INJECTION, SOLUTION INTRA-ARTICULAR; INTRALESIONAL; INTRAMUSCULAR; INTRAVENOUS; SOFT TISSUE
Status: DISCONTINUED | OUTPATIENT
Start: 2025-05-02 | End: 2025-05-02 | Stop reason: SDUPTHER

## 2025-05-02 RX ORDER — TRAMADOL HYDROCHLORIDE 50 MG/1
100 TABLET ORAL EVERY 6 HOURS PRN
Status: DISCONTINUED | OUTPATIENT
Start: 2025-05-02 | End: 2025-05-02 | Stop reason: HOSPADM

## 2025-05-02 RX ORDER — TRAMADOL HYDROCHLORIDE 50 MG/1
50 TABLET ORAL EVERY 6 HOURS PRN
Status: DISCONTINUED | OUTPATIENT
Start: 2025-05-02 | End: 2025-05-02 | Stop reason: HOSPADM

## 2025-05-02 RX ORDER — FENTANYL CITRATE 50 UG/ML
INJECTION, SOLUTION INTRAMUSCULAR; INTRAVENOUS
Status: DISCONTINUED | OUTPATIENT
Start: 2025-05-02 | End: 2025-05-02 | Stop reason: SDUPTHER

## 2025-05-02 RX ORDER — ACETAMINOPHEN 325 MG/1
650 TABLET ORAL EVERY 6 HOURS PRN
Qty: 56 TABLET | Refills: 0 | Status: SHIPPED | OUTPATIENT
Start: 2025-05-02 | End: 2025-05-09

## 2025-05-02 RX ORDER — CLINDAMYCIN HYDROCHLORIDE 300 MG/1
300 CAPSULE ORAL 3 TIMES DAILY
Qty: 3 CAPSULE | Refills: 0 | Status: SHIPPED | OUTPATIENT
Start: 2025-05-02 | End: 2025-05-03

## 2025-05-02 RX ORDER — KETOROLAC TROMETHAMINE 30 MG/ML
30 INJECTION, SOLUTION INTRAMUSCULAR; INTRAVENOUS EVERY 6 HOURS
Status: DISCONTINUED | OUTPATIENT
Start: 2025-05-02 | End: 2025-05-02 | Stop reason: HOSPADM

## 2025-05-02 RX ORDER — NALOXONE HYDROCHLORIDE 0.4 MG/ML
INJECTION, SOLUTION INTRAMUSCULAR; INTRAVENOUS; SUBCUTANEOUS PRN
Status: DISCONTINUED | OUTPATIENT
Start: 2025-05-02 | End: 2025-05-02 | Stop reason: HOSPADM

## 2025-05-02 RX ORDER — METOCLOPRAMIDE HYDROCHLORIDE 5 MG/ML
10 INJECTION INTRAMUSCULAR; INTRAVENOUS
Status: DISCONTINUED | OUTPATIENT
Start: 2025-05-02 | End: 2025-05-02 | Stop reason: HOSPADM

## 2025-05-02 RX ORDER — HYDRALAZINE HYDROCHLORIDE 20 MG/ML
10 INJECTION INTRAMUSCULAR; INTRAVENOUS
Status: DISCONTINUED | OUTPATIENT
Start: 2025-05-02 | End: 2025-05-02 | Stop reason: HOSPADM

## 2025-05-02 RX ORDER — SODIUM CHLORIDE, SODIUM LACTATE, POTASSIUM CHLORIDE, CALCIUM CHLORIDE 600; 310; 30; 20 MG/100ML; MG/100ML; MG/100ML; MG/100ML
INJECTION, SOLUTION INTRAVENOUS
Status: DISCONTINUED | OUTPATIENT
Start: 2025-05-02 | End: 2025-05-02 | Stop reason: SDUPTHER

## 2025-05-02 RX ADMIN — PROPOFOL 120 MG: 10 INJECTION, EMULSION INTRAVENOUS at 08:52

## 2025-05-02 RX ADMIN — ROCURONIUM BROMIDE 50 MG: 50 INJECTION INTRAVENOUS at 08:52

## 2025-05-02 RX ADMIN — ROCURONIUM BROMIDE 10 MG: 50 INJECTION INTRAVENOUS at 09:56

## 2025-05-02 RX ADMIN — ACETAMINOPHEN 1000 MG: 500 TABLET ORAL at 13:10

## 2025-05-02 RX ADMIN — Medication 10 MG: at 09:58

## 2025-05-02 RX ADMIN — CYCLOBENZAPRINE 10 MG: 10 TABLET, FILM COATED ORAL at 13:10

## 2025-05-02 RX ADMIN — SODIUM CHLORIDE, POTASSIUM CHLORIDE, SODIUM LACTATE AND CALCIUM CHLORIDE: 600; 310; 30; 20 INJECTION, SOLUTION INTRAVENOUS at 11:03

## 2025-05-02 RX ADMIN — LIDOCAINE HYDROCHLORIDE 50 MG: 10 INJECTION, SOLUTION EPIDURAL; INFILTRATION; INTRACAUDAL; PERINEURAL at 08:52

## 2025-05-02 RX ADMIN — SODIUM CHLORIDE, POTASSIUM CHLORIDE, SODIUM LACTATE AND CALCIUM CHLORIDE: 600; 310; 30; 20 INJECTION, SOLUTION INTRAVENOUS at 08:46

## 2025-05-02 RX ADMIN — IPRATROPIUM BROMIDE AND ALBUTEROL SULFATE 1 DOSE: .5; 2.5 SOLUTION RESPIRATORY (INHALATION) at 08:14

## 2025-05-02 RX ADMIN — PHENYLEPHRINE HYDROCHLORIDE 100 MCG: 10 INJECTION INTRAVENOUS at 10:42

## 2025-05-02 RX ADMIN — Medication 2000 MG: at 13:09

## 2025-05-02 RX ADMIN — GABAPENTIN 300 MG: 300 CAPSULE ORAL at 13:10

## 2025-05-02 RX ADMIN — Medication 10 MG: at 10:35

## 2025-05-02 RX ADMIN — DEXAMETHASONE SODIUM PHOSPHATE 10 MG: 10 INJECTION INTRAMUSCULAR; INTRAVENOUS at 09:04

## 2025-05-02 RX ADMIN — GLYCOPYRROLATE 0.1 MG: 0.2 INJECTION INTRAMUSCULAR; INTRAVENOUS at 09:25

## 2025-05-02 RX ADMIN — Medication 2 G: at 09:25

## 2025-05-02 RX ADMIN — PHENYLEPHRINE HYDROCHLORIDE 100 MCG: 10 INJECTION INTRAVENOUS at 09:13

## 2025-05-02 RX ADMIN — LEVOTHYROXINE SODIUM 25 MCG: 0.05 TABLET ORAL at 13:10

## 2025-05-02 RX ADMIN — PROPOFOL 80 MG: 10 INJECTION, EMULSION INTRAVENOUS at 09:56

## 2025-05-02 RX ADMIN — FENTANYL CITRATE 50 MCG: 50 INJECTION, SOLUTION INTRAMUSCULAR; INTRAVENOUS at 08:48

## 2025-05-02 RX ADMIN — PHENYLEPHRINE HYDROCHLORIDE 100 MCG: 10 INJECTION INTRAVENOUS at 10:33

## 2025-05-02 RX ADMIN — FENTANYL CITRATE 50 MCG: 50 INJECTION, SOLUTION INTRAMUSCULAR; INTRAVENOUS at 10:06

## 2025-05-02 RX ADMIN — HYDROMORPHONE HYDROCHLORIDE 0.5 MG: 1 INJECTION, SOLUTION INTRAMUSCULAR; INTRAVENOUS; SUBCUTANEOUS at 09:32

## 2025-05-02 RX ADMIN — FENTANYL CITRATE 50 MCG: 50 INJECTION, SOLUTION INTRAMUSCULAR; INTRAVENOUS at 08:52

## 2025-05-02 RX ADMIN — Medication 30 MG: at 09:25

## 2025-05-02 RX ADMIN — HYDROMORPHONE HYDROCHLORIDE 0.5 MG: 1 INJECTION, SOLUTION INTRAMUSCULAR; INTRAVENOUS; SUBCUTANEOUS at 09:50

## 2025-05-02 RX ADMIN — ONDANSETRON 4 MG: 2 INJECTION, SOLUTION INTRAMUSCULAR; INTRAVENOUS at 10:35

## 2025-05-02 RX ADMIN — ROCURONIUM BROMIDE 10 MG: 50 INJECTION INTRAVENOUS at 10:18

## 2025-05-02 RX ADMIN — SUGAMMADEX 200 MG: 100 INJECTION, SOLUTION INTRAVENOUS at 10:56

## 2025-05-02 ASSESSMENT — PAIN DESCRIPTION - LOCATION: LOCATION: LEG

## 2025-05-02 ASSESSMENT — PAIN - FUNCTIONAL ASSESSMENT
PAIN_FUNCTIONAL_ASSESSMENT: 0-10
PAIN_FUNCTIONAL_ASSESSMENT: PREVENTS OR INTERFERES SOME ACTIVE ACTIVITIES AND ADLS

## 2025-05-02 ASSESSMENT — PAIN DESCRIPTION - DESCRIPTORS
DESCRIPTORS: ACHING
DESCRIPTORS: ACHING;DISCOMFORT

## 2025-05-02 ASSESSMENT — LIFESTYLE VARIABLES: SMOKING_STATUS: 1

## 2025-05-02 ASSESSMENT — PAIN SCALES - GENERAL: PAINLEVEL_OUTOF10: 3

## 2025-05-02 NOTE — OP NOTE
Operative Note      Patient: Elizabeth Alex  YOB: 1970  MRN: [id number]    Date of Procedure: 5/2/2025    Pre-Op Diagnosis Codes:      * Lumbar disc herniation with radiculopathy [M51.16]    Post-Op Diagnosis: Same    Indications for procedure.  Patient with large right-sided disc herniation with significant mass effect along with motor deficit and severe weakness with intractable pain making her essentially bedbound.  Risk and benefits discussed in detail prior to proceed with right sided laminotomy and microdiscectomy at L4-L5       Procedure(s):  **ADD-ON** RIGHT LUMBAR 4-5 MICRODISCECTOMY    Surgeon(s):  Bharathi Quevedo DO    Assistant:   Physician Assistant: Hernan Kaur PA-C    Anesthesia: General    Estimated Blood Loss (mL): less than 50     Complications: None    Specimens:   * No specimens in log *    Implants:  * No implants in log *      Drains: * No LDAs found *    Findings:  Infection Present At Time Of Surgery (PATOS) (choose all levels that have infection present):  No infection present  Other Findings: Large disc extruded fragment.    Detailed Description of Procedure:   The patient was consented preoperatively and brought into the operative room.  She was brought under general anesthesia and flipped prone onto the Heriberto frame with all pressure points padded.  Arms were placed on the arm boards.  Midline was marked out.  After sterile prepping draping and timeout was performed a spinal needle was used to identify the L4-5 interspace.  I pretreated incision with 1% lidocaine with epinephrine followed by 10 blade to make incision followed by Bovie and Fatima to perform a subperiosteal dissection of the medial facet line on the right side.  Interlaminar at L4 was marked with a Kocher.  Beth retractor was then placed and the drill was used to make a laminotomy at inferior L4 and superior L5 taking approximately one third of the facet.  The operative microscope was brought into

## 2025-05-02 NOTE — PROGRESS NOTES
signs and symptoms: Back pain/ Leg pain Relevant Medical/Surgical History: Surgical hx - Lumbar surgery. FINDINGS: BONES/ALIGNMENT: The vertebral body heights appear maintained.  Postsurgical changes involving the posterior elements at the L5-S1 level.  The vertebral body heights appear maintained.  There is straightening of the normal lumbar lordosis.  Mild retrolisthesis at L4-L5 and L5-S1.  There is loss of disc space height with endplate irregularity as well as Modic type 1 degenerative endplate changes at L4-L5 and L5-S1.  Otherwise, the bone marrow signal demonstrates no acute abnormality. SPINAL CORD: The conus terminates at a normal level. SOFT TISSUES: No paraspinal mass identified. L1-L2: There is no significant disc bulge, spinal canal stenosis or neural foraminal narrowing. L2-L3: There is no significant disc bulge, spinal canal stenosis or neural foraminal narrowing. L3-L4: There is a disc bulge with a left far lateral predominance along with bilateral facet arthrosis.  Minimal spinal canal stenosis.  Minimal right and mild left neural foraminal narrowing. L4-L5: There is a disc bulge with a right paracentral disc extrusion with inferior migration along with buckling of the ligamentum flavum and facet arthrosis.  Moderate to severe spinal canal stenosis.  Narrowing of the right lateral recess with likely impingement upon the right L5 nerve root. Moderate right and mild left neural foraminal narrowing. L5-S1: There is a disc bulge with a left paracentral disc protrusion along with bilateral facet arthrosis.  No significant spinal canal stenosis.  Facet arthrosis contributes to moderate right and mild left neural foraminal narrowing.     1. Postsurgical changes involving the posterior elements at L5-S1. 2. Moderate to severe spinal canal stenosis at L4-L5 with minimal stenosis at L3-L4. 3. Narrowing of the right lateral recess at L4-L5 with likely impingement upon the right L5 nerve root. 4. Multilevel

## 2025-05-02 NOTE — PLAN OF CARE
BRONCHOSPASM/BRONCHOCONSTRICTION     [x]         IMPROVE AERATION/BREATH SOUNDS  [x]   ADMINISTER BRONCHODILATOR THERAPY AS APPROPRIATE  [x]   ASSESS BREATH SOUNDS  []   IMPLEMENT AEROSOL/MDI PROTOCOL  [x]   PATIENT EDUCATION AS NEEDED      Problem: Respiratory - Adult  Goal: Achieves optimal ventilation and oxygenation  Outcome: Progressing

## 2025-05-02 NOTE — PROGRESS NOTES
Neurosurgery Post op Progress Note      SUBJECTIVE: Status post: 1.  Right, lumbar 4-5 microdiscectomy.    Patient seen while in PACU.  Patient appears to be alert and oriented.  The patient states that the pain she had in and around her lateral right leg and knee is significantly improved compared to prior to surgery.  She is able to follow commands.  She is able to move both upper extremities.  She denies any new onset or presence of numbness, tingling, burning or paresthesia type pains.      OBJECTIVE      Physical exam   VITALS:    Vitals:    05/02/25 1106   BP: 116/86   Pulse: 81   Resp: 12   Temp: 97.2 °F (36.2 °C)   SpO2: 100%     INTAKE:    Intake/Output Summary (Last 24 hours) at 5/2/2025 1120  Last data filed at 5/2/2025 1103  Gross per 24 hour   Intake 1000 ml   Output 400 ml   Net 600 ml       URINARY CATHETER OUTPUT (Jeffrey): No Jeffrey catheter.    DRAIN/TUBE OUTPUT: No drains to the surgical site    No evidence of DVT seen on physical exam.    Neurological exam reveals Awake and alert, Responds to voice, and Responds to tactile stimuli  alert, oriented x3, affect appropriate, moves all extremities well, no involuntary movements, and reflexes at knee and ankle intact  alert, oriented, normal speech, no focal findings or movement disorder noted.   the upper and lower extremities: Patient still has slight weakness associated to right EHL function.  Pain to the right lower extremity significantly improved.  no sensory deficits noted, normal light touch sensation, and normal position sensation      Wound   Post op wound:  posterior lumbar spine   well approximated incision clean, dry, and no drainage. Closed w/ subcuticular Monocryl with Dermabond.  No dressing overlying incision site.    Data      LABS:   Lab Results   Component Value Date    WBC 8.1 05/01/2025    HGB 12.9 05/01/2025    HCT 40.0 05/01/2025    MCV 93.9 05/01/2025     05/01/2025      Lab Results   Component Value Date

## 2025-05-02 NOTE — ANESTHESIA PRE PROCEDURE
Department of Anesthesiology  Preprocedure Note       Name:  Elizabeth Alex   Age:  55 y.o.  :  1970                                          MRN:  [id number]         Date:  2025      Surgeon: Surgeon(s):  Bharathi Quevedo DO    Procedure: Procedure(s):  **ADD-ON** RIGHT LUMBAR 4-5 MICRODISCECTOMY (PRONE, ADELINE SPINE WITH DANYELL FRAME, MICROSCOPE, C-ARM)    Medications prior to admission:   Prior to Admission medications    Medication Sig Start Date End Date Taking? Authorizing Provider   cyclobenzaprine (FLEXERIL) 10 MG tablet Take 1 tablet by mouth 3 times daily as needed for Muscle spasms 25  Emiliana Carrero MD   predniSONE (DELTASONE) 10 MG tablet Take 4 tablets by mouth once daily for 5 days 25  Emiliana Carrero MD   buprenorphine-naloxone (SUBOXONE) 8-2 MG FILM SL film Place under the tongue. 25   Yeni Barba MD   DULoxetine (CYMBALTA) 30 MG extended release capsule Take by mouth daily 3/20/25   Yeni Barba MD   gabapentin (NEURONTIN) 300 MG capsule Take 1 capsule by mouth 3 times daily. 3/24/25   Yeni Barba MD   mometasone-formoterol (DULERA) 100-5 MCG/ACT inhaler Inhale 1 puff into the lungs 2 times daily 23   Dario Michelle APRN - CNP   albuterol sulfate HFA (PROVENTIL;VENTOLIN;PROAIR) 108 (90 Base) MCG/ACT inhaler Inhale 2 puffs into the lungs every 6 hours as needed for Wheezing or Shortness of Breath 23   Dario Michelle APRN - CNP   bisacodyl 5 MG EC tablet Take 1 tablet by mouth daily as needed for Constipation 23   Dario Michelle APRN - CNP   polyethylene glycol (GLYCOLAX) 17 GM/SCOOP powder Follow instructions given by provider 23   Dario Michelle APRN - CNP   sertraline (ZOLOFT) 25 MG tablet Take 1 tablet by mouth daily  Patient not taking: Reported on 23   Dario Michelle APRN - CNP   docusate sodium (COLACE) 100 MG capsule TAKE 1 CAPSULE BY MOUTH TWICE DAILY  Patient not taking: Reported on 2025

## 2025-05-02 NOTE — PLAN OF CARE
Patient seen and examined after arriving back to her room after surgery. She states she is doing well and her lumbar, and RLE pain has improved since surgery as well as the lumbar radiculopathy. Her strengths on her RLE have also improved. She states she would like to go home today. Attempted to call Dr. Rosado at [facility] to ask for recommendations for postop pain management since he is managing her suboxone.

## 2025-05-02 NOTE — ANESTHESIA POSTPROCEDURE EVALUATION
Department of Anesthesiology  Postprocedure Note    Patient: Elizabeth Alex  MRN: [id number]  YOB: 1970  Date of evaluation: 5/2/2025    Procedure Summary       Date: 05/02/25 Room / Location: [address]    Anesthesia Start: 0846 Anesthesia Stop: 1107    Procedure: **ADD-ON** RIGHT LUMBAR 4-5 MICRODISCECTOMY (Right: Spine Lumbar) Diagnosis:       Lumbar disc herniation with radiculopathy      (Lumbar disc herniation with radiculopathy [M51.16])    Surgeons: Bharathi Quevedo DO Responsible Provider: Luis Gan MD    Anesthesia Type: general ASA Status: 2            Anesthesia Type: No value filed.    Guzman Phase I: Guzman Score: 10    Guzman Phase II:      Anesthesia Post Evaluation    Patient location during evaluation: bedside  Patient participation: complete - patient participated  Level of consciousness: awake and alert  Airway patency: patent  Nausea & Vomiting: no nausea and no vomiting  Cardiovascular status: hemodynamically stable  Respiratory status: acceptable  Hydration status: stable  Comments: /62   Pulse 83   Temp 97.4 °F (36.3 °C) (Oral)   Resp 18   LMP 03/10/2017   SpO2 94%     Pain management: adequate    No notable events documented.

## 2025-05-02 NOTE — DISCHARGE INSTRUCTIONS
Lumbar Spine Surgery Discharge Instructions     Thank you for choosing [facility] for your surgical needs. The following instructions will help to ensure your comfort and that you are well prepared after your surgery.     Post-Operative Visit:   The office is located at:    [facility]    [facility] [address], [address], [place] [address]    [phone]     Please also call your primary care physician to schedule an appointment for further evaluation and care.     Diet:   You may resume your regular diet.   Be sure to eat a well-balanced diet. Protein promotes wound healing.   Pain medication and decreased activity can cause constipation. Drink 8-10 glasses of water a day, eat fresh fruits and vegetables, and add prunes, raisins and bran cereals to your diet if you do become constipated. A stool softener taken 1-2 times a day is helpful. Dulcolax suppositories or Fleets enemas are also available without a prescription. Call our office if the problem continues.     Activity and Exercise:   No driving until you are seen in the office.   Avoid riding in a car for the first two weeks until you come to the office for your scheduled follow-up.   Start taking short, frequent walks in the beginning. [place], more frequent walks throughout the day are more beneficial than one long walk each day. You may gradually increase the distance; as tolerated. Your brace will help give support to your muscles while you walk.   If your pain increases, you may be walking too much or too far. Try backing off for a day or two and then resume slowly.   No lifting greater than 5 lbs (gallon of milk). No bending at the waist. Instead, bend at the knees and squat to pick something up.   If physical therapy has been prescribed, you are not to perform range of motion, flexion, extension or lateral bending.   No baths, swimming or

## 2025-05-02 NOTE — PROGRESS NOTES
Pt is discharged;writer reviewed d/c instructions with pt;questions/concerns addressed;pt verbalized understanding to the d/c instructions. X1 PIV removed w/o complications, catheter tip intact.

## 2025-05-02 NOTE — PLAN OF CARE
Problem: Pain  Goal: Verbalizes/displays adequate comfort level or baseline comfort level  Outcome: Progressing     Problem: Discharge Planning  Goal: Discharge to home or other facility with appropriate resources  Outcome: Progressing     Problem: Safety - Adult  Goal: Free from fall injury  Outcome: Progressing     Problem: Respiratory - Adult  Goal: Achieves optimal ventilation and oxygenation  5/1/2025 2056 by Dario Roman RCP  Outcome: Progressing

## 2025-05-02 NOTE — PLAN OF CARE
Problem: Pain  Goal: Verbalizes/displays adequate comfort level or baseline comfort level  5/2/2025 0802 by Luz Montilla, RN  Outcome: Not Progressing  5/1/2025 1953 by Alessia Gates, RN  Outcome: Progressing

## 2025-05-02 NOTE — PROGRESS NOTES
OBS/CDU   RESIDENT NOTE      Patients PCP is:  Dario Michelle, LISANDRA - GOVIND        SUBJECTIVE      Patient seen and examined. No acute events overnight.  Plan for surgery today.    PHYSICAL EXAM      General: Alert, patient appears uncomfortable  Heent: mucus membranes moist  Cardiovascular: regular rate and rhythm  Pulmonary: normal effort on room air, no respiratory distress  Neuro / Psych: No focal neurological deficits, A&O x3     PERTINENT TEST /EXAMS      I have reviewed all available laboratory results.    MEDICATIONS CURRENT   buprenorphine-naloxone (SUBOXONE) 4-1 MG SL film 2 Film, BID  gabapentin (NEURONTIN) capsule 300 mg, TID  levothyroxine (SYNTHROID) tablet 25 mcg, Daily  predniSONE (DELTASONE) tablet 40 mg, Daily  sodium chloride flush 0.9 % injection 5-40 mL, 2 times per day  sodium chloride flush 0.9 % injection 5-40 mL, PRN  0.9 % sodium chloride infusion, PRN  potassium chloride (KLOR-CON M) extended release tablet 40 mEq, PRN   Or  potassium bicarb-citric acid (EFFER-K) effervescent tablet 40 mEq, PRN   Or  potassium chloride 10 mEq/100 mL IVPB (Peripheral Line), PRN  magnesium sulfate 2000 mg in 50 mL IVPB premix, PRN  [Held by provider] enoxaparin (LOVENOX) injection 40 mg, Daily  ondansetron (ZOFRAN-ODT) disintegrating tablet 4 mg, Q8H PRN   Or  ondansetron (ZOFRAN) injection 4 mg, Q6H PRN  polyethylene glycol (GLYCOLAX) packet 17 g, Daily PRN  ketorolac (TORADOL) injection 15 mg, Q6H  cyclobenzaprine (FLEXERIL) tablet 10 mg, TID  acetaminophen (TYLENOL) tablet 1,000 mg, 4 times per day  albuterol (PROVENTIL) (2.5 MG/3ML) 0.083% nebulizer solution 2.5 mg, BID RT        All medication charted and reviewed.    CONSULTS      IP CONSULT TO NEUROSURGERY    ASSESSMENT/PLAN       Elizabeth Alex is a 55 y.o. female who presents with right lower back pain radiating to RLE.  Failed outpatient treatment. Abnormal CT indicating very large right subarticular disc extrusion versus sequestration of at L4-L5. MRI  was recommended. Given these findings and patient's symptoms, she was admitted to the observation service for further evaluation and treatment.     MRI:   1. Postsurgical changes involving the posterior elements at L5-S1.  2. Moderate to severe spinal canal stenosis at L4-L5 with minimal stenosis at  L3-L4.  3. Narrowing of the right lateral recess at L4-L5 with likely impingement  upon the right L5 nerve root.  4. Multilevel neural foraminal narrowing as above.  5. Loss of disc space height with endplate irregularity as well as Modic type  1 degenerative endplate changes at L4-L5 and L5-S1.    Neurosurgery consulted, appreciate their recommendations  MRI reviewed  Plan 4 OR today for right lumbar microdiscectomy  N.p.o. for procedure  Continue home medications and pain control  Monitor vitals, labs, and imaging    DISPO: pending clinical improvement    --  Geovanna Patricio, DO  Emergency Medicine Resident Physician     This dictation was generated by voice recognition computer software.  Although all attempts are made to edit the dictation for accuracy, there may be errors in the transcription that are not intended.

## 2025-05-02 NOTE — PROGRESS NOTES
CLINICAL PHARMACY NOTE: MEDS TO BEDS    Total # of Prescriptions Filled: 2   The following medications were delivered to the patient:  Tylenol  clindamycin    Additional Documentation:

## 2025-05-05 ENCOUNTER — TELEPHONE (OUTPATIENT)
Dept: FAMILY MEDICINE CLINIC | Age: 55
End: 2025-05-05

## 2025-05-05 NOTE — TELEPHONE ENCOUNTER
Care Transitions Initial Follow Up Call    Outreach made within 2 business days of discharge: Yes    Patient: Rhonda Alex Patient : 1970   MRN: [id number]  Reason for Admission: POST-OPERATIVE PAIN  Discharge Date: 25       Spoke with: RHONDA    Discharge department/facility: [facility] Interactive Patient Contact:  Was patient able to fill all prescriptions: Yes  Was patient instructed to bring all medications to the follow-up visit: Yes  Is patient taking all medications as directed in the discharge summary? Yes  Does patient understand their discharge instructions: Yes  Does patient have questions or concerns that need addressed prior to 7-14 day follow up office visit: no    Additional needs identified to be addressed with provider  NONE             Scheduled appointment with PCP within 7-14 days    Follow Up  Future Appointments   Date Time Provider Department Center   2025  1:00 PM Iesha Borrero, APRN - CNP [id number]       Yamel Edgar MA

## 2025-05-06 ENCOUNTER — TELEPHONE (OUTPATIENT)
Dept: FAMILY MEDICINE CLINIC | Age: 55
End: 2025-05-06

## 2025-05-06 NOTE — TELEPHONE ENCOUNTER
Care Transitions Initial Follow Up Call    Outreach made within 2 business days of discharge: Yes    Patient: Elizabeth Alex Patient : 1970   MRN: [id number]  Reason for Admission: Lumbar pain   Discharge Date: 25       Spoke with: no answer     Discharge department/facility: [facility]     [facility] Interactive Patient Contact:  Was patient able to fill all prescriptions:   Was patient instructed to bring all medications to the follow-up visit:   Is patient taking all medications as directed in the discharge summary?   Does patient understand their discharge instructions:   Does patient have questions or concerns that need addressed prior to 7-14 day follow up office visit:     Additional needs identified to be addressed with provider                 Jody Franklin MA

## 2025-05-08 NOTE — PROGRESS NOTES
[facility]  CDU / OBSERVATION eNCOUnter  Attending NOte       I performed a history and physical examination of the patient and discussed management with the resident.  This patient was placed in the observation unit for reevaluation for possible admission to the hospital. I reviewed the resident’s note and agree with the documented findings and plan of care. Any areas of disagreement are noted on the chart. I was personally present for the key portions of any procedures. I have documented in the chart those procedures where I was not present during the key portions. I have reviewed the nurses notes. I agree with the chief complaint, past medical history, past surgical history, allergies, medications, social and family history as documented unless otherwise noted below.    The patient was placed in the observation unit for reevaluation for possible admission to the hospital.      The Family history, social history, and ROS are effectively unchanged since admission unless noted elsewhere in the chart.    Patient seen postoperatively.  She is status post right-sided laminectomy and microdiscectomy at L4-L5.  Patient is resting comfortably.  She states her leg symptoms are already improving.  Patient's care to be assumed by neurosurgery going forward.    Yuliana Morris MD  Attending Emergency  Physician

## 2025-05-28 ENCOUNTER — HOSPITAL ENCOUNTER (INPATIENT)
Age: 55
LOS: 3 days | Discharge: NURSING, REHAB OR LONG-TERM CARE | End: 2025-05-31
Attending: EMERGENCY MEDICINE | Admitting: INTERNAL MEDICINE
Payer: MEDICAID

## 2025-05-28 ENCOUNTER — APPOINTMENT (OUTPATIENT)
Dept: CT IMAGING | Age: 55
End: 2025-05-28
Payer: MEDICAID

## 2025-05-28 DIAGNOSIS — R51.9 ACUTE NONINTRACTABLE HEADACHE, UNSPECIFIED HEADACHE TYPE: Primary | ICD-10-CM

## 2025-05-28 DIAGNOSIS — K52.9 GASTROENTERITIS: ICD-10-CM

## 2025-05-28 PROBLEM — D72.829 LEUKOCYTOSIS: Status: ACTIVE | Noted: 2025-05-28

## 2025-05-28 PROBLEM — N39.0 UTI (URINARY TRACT INFECTION): Status: ACTIVE | Noted: 2025-05-28

## 2025-05-28 PROBLEM — I16.0 HYPERTENSIVE URGENCY: Status: ACTIVE | Noted: 2025-05-28

## 2025-05-28 LAB
ALBUMIN SERPL-MCNC: 5.6 G/DL (ref 3.5–5.2)
ALBUMIN/GLOB SERPL: 1.8 {RATIO} (ref 1–2.5)
ALP SERPL-CCNC: 137 U/L (ref 35–104)
ALT SERPL-CCNC: 35 U/L (ref 10–35)
ANION GAP SERPL CALCULATED.3IONS-SCNC: 21 MMOL/L (ref 9–16)
AST SERPL-CCNC: 38 U/L (ref 10–35)
BACTERIA URNS QL MICRO: ABNORMAL
BASOPHILS # BLD: <0.03 K/UL (ref 0–0.2)
BASOPHILS NFR BLD: 0 % (ref 0–2)
BILIRUB SERPL-MCNC: 0.4 MG/DL (ref 0–1.2)
BILIRUB UR QL STRIP: NEGATIVE
BUN SERPL-MCNC: 16 MG/DL (ref 6–20)
CALCIUM SERPL-MCNC: 11.1 MG/DL (ref 8.6–10.4)
CASTS #/AREA URNS LPF: ABNORMAL /LPF (ref 0–8)
CHLORIDE SERPL-SCNC: 99 MMOL/L (ref 98–107)
CLARITY UR: ABNORMAL
CO2 SERPL-SCNC: 24 MMOL/L (ref 20–31)
COLOR UR: YELLOW
CREAT SERPL-MCNC: 0.9 MG/DL (ref 0.6–0.9)
EOSINOPHIL # BLD: <0.03 K/UL (ref 0–0.44)
EOSINOPHILS RELATIVE PERCENT: 0 % (ref 1–4)
EPI CELLS #/AREA URNS HPF: ABNORMAL /HPF (ref 0–5)
ERYTHROCYTE [DISTWIDTH] IN BLOOD BY AUTOMATED COUNT: 12.2 % (ref 11.8–14.4)
GFR, ESTIMATED: 76 ML/MIN/1.73M2
GLUCOSE SERPL-MCNC: 136 MG/DL (ref 74–99)
GLUCOSE UR STRIP-MCNC: NEGATIVE MG/DL
HCT VFR BLD AUTO: 45.5 % (ref 36.3–47.1)
HGB BLD-MCNC: 15.6 G/DL (ref 11.9–15.1)
HGB UR QL STRIP.AUTO: ABNORMAL
IMM GRANULOCYTES # BLD AUTO: 0.05 K/UL (ref 0–0.3)
IMM GRANULOCYTES NFR BLD: 0 %
KETONES UR STRIP-MCNC: NEGATIVE MG/DL
LEUKOCYTE ESTERASE UR QL STRIP: NEGATIVE
LIPASE SERPL-CCNC: 18 U/L (ref 13–60)
LYMPHOCYTES NFR BLD: 1.22 K/UL (ref 1.1–3.7)
LYMPHOCYTES RELATIVE PERCENT: 10 % (ref 24–43)
MCH RBC QN AUTO: 30.4 PG (ref 25.2–33.5)
MCHC RBC AUTO-ENTMCNC: 34.3 G/DL (ref 28.4–34.8)
MCV RBC AUTO: 88.5 FL (ref 82.6–102.9)
MONOCYTES NFR BLD: 0.4 K/UL (ref 0.1–1.2)
MONOCYTES NFR BLD: 3 % (ref 3–12)
NEUTROPHILS NFR BLD: 87 % (ref 36–65)
NEUTS SEG NFR BLD: 10.15 K/UL (ref 1.5–8.1)
NITRITE UR QL STRIP: NEGATIVE
NRBC BLD-RTO: 0 PER 100 WBC
PH UR STRIP: 7 [PH] (ref 5–8)
PLATELET # BLD AUTO: 295 K/UL (ref 138–453)
PMV BLD AUTO: 11 FL (ref 8.1–13.5)
POTASSIUM SERPL-SCNC: 4.1 MMOL/L (ref 3.7–5.3)
PROT SERPL-MCNC: 8.8 G/DL (ref 6.6–8.7)
PROT UR STRIP-MCNC: ABNORMAL MG/DL
RBC # BLD AUTO: 5.14 M/UL (ref 3.95–5.11)
RBC #/AREA URNS HPF: ABNORMAL /HPF (ref 0–4)
SODIUM SERPL-SCNC: 144 MMOL/L (ref 136–145)
SP GR UR STRIP: 1.06 (ref 1–1.03)
TSH SERPL DL<=0.05 MIU/L-ACNC: 2.11 UIU/ML (ref 0.27–4.2)
UROBILINOGEN UR STRIP-ACNC: NORMAL EU/DL (ref 0–1)
WBC #/AREA URNS HPF: ABNORMAL /HPF (ref 0–5)
WBC OTHER # BLD: 11.8 K/UL (ref 3.5–11.3)

## 2025-05-28 PROCEDURE — 96374 THER/PROPH/DIAG INJ IV PUSH: CPT

## 2025-05-28 PROCEDURE — 99285 EMERGENCY DEPT VISIT HI MDM: CPT

## 2025-05-28 PROCEDURE — 2060000000 HC ICU INTERMEDIATE R&B

## 2025-05-28 PROCEDURE — 80053 COMPREHEN METABOLIC PANEL: CPT

## 2025-05-28 PROCEDURE — 6360000002 HC RX W HCPCS: Performed by: STUDENT IN AN ORGANIZED HEALTH CARE EDUCATION/TRAINING PROGRAM

## 2025-05-28 PROCEDURE — 6360000004 HC RX CONTRAST MEDICATION: Performed by: STUDENT IN AN ORGANIZED HEALTH CARE EDUCATION/TRAINING PROGRAM

## 2025-05-28 PROCEDURE — 99223 1ST HOSP IP/OBS HIGH 75: CPT | Performed by: INTERNAL MEDICINE

## 2025-05-28 PROCEDURE — 6370000000 HC RX 637 (ALT 250 FOR IP): Performed by: STUDENT IN AN ORGANIZED HEALTH CARE EDUCATION/TRAINING PROGRAM

## 2025-05-28 PROCEDURE — 87186 SC STD MICRODIL/AGAR DIL: CPT

## 2025-05-28 PROCEDURE — 85025 COMPLETE CBC W/AUTO DIFF WBC: CPT

## 2025-05-28 PROCEDURE — 2580000003 HC RX 258: Performed by: STUDENT IN AN ORGANIZED HEALTH CARE EDUCATION/TRAINING PROGRAM

## 2025-05-28 PROCEDURE — 2500000003 HC RX 250 WO HCPCS

## 2025-05-28 PROCEDURE — 84443 ASSAY THYROID STIM HORMONE: CPT

## 2025-05-28 PROCEDURE — 36415 COLL VENOUS BLD VENIPUNCTURE: CPT

## 2025-05-28 PROCEDURE — 93005 ELECTROCARDIOGRAM TRACING: CPT | Performed by: STUDENT IN AN ORGANIZED HEALTH CARE EDUCATION/TRAINING PROGRAM

## 2025-05-28 PROCEDURE — 83690 ASSAY OF LIPASE: CPT

## 2025-05-28 PROCEDURE — 87040 BLOOD CULTURE FOR BACTERIA: CPT

## 2025-05-28 PROCEDURE — 81001 URINALYSIS AUTO W/SCOPE: CPT

## 2025-05-28 PROCEDURE — 6360000002 HC RX W HCPCS: Performed by: INTERNAL MEDICINE

## 2025-05-28 PROCEDURE — 6370000000 HC RX 637 (ALT 250 FOR IP)

## 2025-05-28 PROCEDURE — 6360000002 HC RX W HCPCS

## 2025-05-28 PROCEDURE — 87077 CULTURE AEROBIC IDENTIFY: CPT

## 2025-05-28 PROCEDURE — 74177 CT ABD & PELVIS W/CONTRAST: CPT

## 2025-05-28 PROCEDURE — 87086 URINE CULTURE/COLONY COUNT: CPT

## 2025-05-28 PROCEDURE — 96375 TX/PRO/DX INJ NEW DRUG ADDON: CPT

## 2025-05-28 RX ORDER — DULOXETIN HYDROCHLORIDE 30 MG/1
30 CAPSULE, DELAYED RELEASE ORAL DAILY
Status: DISCONTINUED | OUTPATIENT
Start: 2025-05-28 | End: 2025-05-31 | Stop reason: HOSPADM

## 2025-05-28 RX ORDER — AMLODIPINE BESYLATE 5 MG/1
5 TABLET ORAL DAILY
Status: DISCONTINUED | OUTPATIENT
Start: 2025-05-28 | End: 2025-05-30

## 2025-05-28 RX ORDER — ONDANSETRON 2 MG/ML
4 INJECTION INTRAMUSCULAR; INTRAVENOUS ONCE
Status: COMPLETED | OUTPATIENT
Start: 2025-05-28 | End: 2025-05-28

## 2025-05-28 RX ORDER — POLYETHYLENE GLYCOL 3350 17 G/17G
17 POWDER, FOR SOLUTION ORAL DAILY PRN
Status: DISCONTINUED | OUTPATIENT
Start: 2025-05-28 | End: 2025-05-31 | Stop reason: HOSPADM

## 2025-05-28 RX ORDER — ENOXAPARIN SODIUM 100 MG/ML
40 INJECTION SUBCUTANEOUS DAILY
Status: DISCONTINUED | OUTPATIENT
Start: 2025-05-28 | End: 2025-05-31 | Stop reason: HOSPADM

## 2025-05-28 RX ORDER — LOSARTAN POTASSIUM 50 MG/1
25 TABLET ORAL ONCE
Status: COMPLETED | OUTPATIENT
Start: 2025-05-28 | End: 2025-05-28

## 2025-05-28 RX ORDER — ONDANSETRON 2 MG/ML
4 INJECTION INTRAMUSCULAR; INTRAVENOUS EVERY 6 HOURS PRN
Status: DISCONTINUED | OUTPATIENT
Start: 2025-05-28 | End: 2025-05-30

## 2025-05-28 RX ORDER — KETOROLAC TROMETHAMINE 15 MG/ML
15 INJECTION, SOLUTION INTRAMUSCULAR; INTRAVENOUS ONCE
Status: COMPLETED | OUTPATIENT
Start: 2025-05-28 | End: 2025-05-28

## 2025-05-28 RX ORDER — SODIUM CHLORIDE 0.9 % (FLUSH) 0.9 %
5-40 SYRINGE (ML) INJECTION EVERY 12 HOURS SCHEDULED
Status: DISCONTINUED | OUTPATIENT
Start: 2025-05-28 | End: 2025-05-31 | Stop reason: HOSPADM

## 2025-05-28 RX ORDER — GABAPENTIN 300 MG/1
300 CAPSULE ORAL 3 TIMES DAILY
Status: DISCONTINUED | OUTPATIENT
Start: 2025-05-28 | End: 2025-05-31 | Stop reason: HOSPADM

## 2025-05-28 RX ORDER — PROMETHAZINE HYDROCHLORIDE 12.5 MG/1
25 TABLET ORAL ONCE
Status: COMPLETED | OUTPATIENT
Start: 2025-05-28 | End: 2025-05-28

## 2025-05-28 RX ORDER — ACETAMINOPHEN 650 MG/1
650 SUPPOSITORY RECTAL EVERY 6 HOURS PRN
Status: DISCONTINUED | OUTPATIENT
Start: 2025-05-28 | End: 2025-05-31 | Stop reason: HOSPADM

## 2025-05-28 RX ORDER — MAGNESIUM SULFATE IN WATER 40 MG/ML
2000 INJECTION, SOLUTION INTRAVENOUS PRN
Status: DISCONTINUED | OUTPATIENT
Start: 2025-05-28 | End: 2025-05-31 | Stop reason: HOSPADM

## 2025-05-28 RX ORDER — PROCHLORPERAZINE EDISYLATE 5 MG/ML
10 INJECTION INTRAMUSCULAR; INTRAVENOUS ONCE
Status: COMPLETED | OUTPATIENT
Start: 2025-05-28 | End: 2025-05-28

## 2025-05-28 RX ORDER — DIPHENHYDRAMINE HYDROCHLORIDE 50 MG/ML
25 INJECTION, SOLUTION INTRAMUSCULAR; INTRAVENOUS ONCE
Status: COMPLETED | OUTPATIENT
Start: 2025-05-28 | End: 2025-05-28

## 2025-05-28 RX ORDER — METOPROLOL TARTRATE 1 MG/ML
5 INJECTION, SOLUTION INTRAVENOUS EVERY 6 HOURS PRN
Status: DISCONTINUED | OUTPATIENT
Start: 2025-05-28 | End: 2025-05-28

## 2025-05-28 RX ORDER — ACETAMINOPHEN 500 MG
1000 TABLET ORAL ONCE
Status: COMPLETED | OUTPATIENT
Start: 2025-05-28 | End: 2025-05-28

## 2025-05-28 RX ORDER — LABETALOL HYDROCHLORIDE 5 MG/ML
10 INJECTION, SOLUTION INTRAVENOUS EVERY 6 HOURS PRN
Status: DISCONTINUED | OUTPATIENT
Start: 2025-05-28 | End: 2025-05-30

## 2025-05-28 RX ORDER — PROMETHAZINE HYDROCHLORIDE 25 MG/ML
6.25 INJECTION, SOLUTION INTRAMUSCULAR; INTRAVENOUS EVERY 6 HOURS PRN
Status: DISCONTINUED | OUTPATIENT
Start: 2025-05-28 | End: 2025-05-30

## 2025-05-28 RX ORDER — ONDANSETRON 4 MG/1
4 TABLET, ORALLY DISINTEGRATING ORAL EVERY 8 HOURS PRN
Status: DISCONTINUED | OUTPATIENT
Start: 2025-05-28 | End: 2025-05-30

## 2025-05-28 RX ORDER — IOPAMIDOL 755 MG/ML
75 INJECTION, SOLUTION INTRAVASCULAR
Status: COMPLETED | OUTPATIENT
Start: 2025-05-28 | End: 2025-05-28

## 2025-05-28 RX ORDER — 0.9 % SODIUM CHLORIDE 0.9 %
1000 INTRAVENOUS SOLUTION INTRAVENOUS ONCE
Status: COMPLETED | OUTPATIENT
Start: 2025-05-28 | End: 2025-05-28

## 2025-05-28 RX ORDER — SODIUM CHLORIDE 9 MG/ML
INJECTION, SOLUTION INTRAVENOUS PRN
Status: DISCONTINUED | OUTPATIENT
Start: 2025-05-28 | End: 2025-05-31 | Stop reason: HOSPADM

## 2025-05-28 RX ORDER — IPRATROPIUM BROMIDE AND ALBUTEROL SULFATE 2.5; .5 MG/3ML; MG/3ML
1 SOLUTION RESPIRATORY (INHALATION) EVERY 4 HOURS PRN
Status: DISCONTINUED | OUTPATIENT
Start: 2025-05-28 | End: 2025-05-31 | Stop reason: HOSPADM

## 2025-05-28 RX ORDER — LOSARTAN POTASSIUM 50 MG/1
25 TABLET ORAL DAILY
Status: DISCONTINUED | OUTPATIENT
Start: 2025-05-29 | End: 2025-05-29

## 2025-05-28 RX ORDER — LABETALOL HYDROCHLORIDE 5 MG/ML
10 INJECTION, SOLUTION INTRAVENOUS ONCE
Status: COMPLETED | OUTPATIENT
Start: 2025-05-28 | End: 2025-05-28

## 2025-05-28 RX ORDER — POTASSIUM CHLORIDE 7.45 MG/ML
10 INJECTION INTRAVENOUS PRN
Status: DISCONTINUED | OUTPATIENT
Start: 2025-05-28 | End: 2025-05-31 | Stop reason: HOSPADM

## 2025-05-28 RX ORDER — SODIUM CHLORIDE 0.9 % (FLUSH) 0.9 %
5-40 SYRINGE (ML) INJECTION PRN
Status: DISCONTINUED | OUTPATIENT
Start: 2025-05-28 | End: 2025-05-31 | Stop reason: HOSPADM

## 2025-05-28 RX ORDER — HYDRALAZINE HYDROCHLORIDE 20 MG/ML
10 INJECTION INTRAMUSCULAR; INTRAVENOUS EVERY 6 HOURS PRN
Status: DISCONTINUED | OUTPATIENT
Start: 2025-05-28 | End: 2025-05-31 | Stop reason: HOSPADM

## 2025-05-28 RX ORDER — ACETAMINOPHEN 325 MG/1
650 TABLET ORAL EVERY 6 HOURS PRN
Status: DISCONTINUED | OUTPATIENT
Start: 2025-05-28 | End: 2025-05-31 | Stop reason: HOSPADM

## 2025-05-28 RX ORDER — POTASSIUM CHLORIDE 1500 MG/1
40 TABLET, EXTENDED RELEASE ORAL PRN
Status: DISCONTINUED | OUTPATIENT
Start: 2025-05-28 | End: 2025-05-31 | Stop reason: HOSPADM

## 2025-05-28 RX ADMIN — IOPAMIDOL 75 ML: 755 INJECTION, SOLUTION INTRAVENOUS at 08:42

## 2025-05-28 RX ADMIN — PROCHLORPERAZINE EDISYLATE 10 MG: 5 INJECTION INTRAMUSCULAR; INTRAVENOUS at 10:24

## 2025-05-28 RX ADMIN — GABAPENTIN 300 MG: 300 CAPSULE ORAL at 19:29

## 2025-05-28 RX ADMIN — FAMOTIDINE 20 MG: 10 INJECTION, SOLUTION INTRAVENOUS at 06:18

## 2025-05-28 RX ADMIN — HYDRALAZINE HYDROCHLORIDE 10 MG: 20 INJECTION INTRAMUSCULAR; INTRAVENOUS at 13:49

## 2025-05-28 RX ADMIN — ONDANSETRON 4 MG: 2 INJECTION INTRAMUSCULAR; INTRAVENOUS at 14:20

## 2025-05-28 RX ADMIN — SODIUM CHLORIDE 40 MG: 9 INJECTION INTRAMUSCULAR; INTRAVENOUS; SUBCUTANEOUS at 07:23

## 2025-05-28 RX ADMIN — ACETAMINOPHEN 1000 MG: 500 TABLET, FILM COATED ORAL at 10:27

## 2025-05-28 RX ADMIN — PROMETHAZINE HYDROCHLORIDE 25 MG: 12.5 TABLET ORAL at 07:26

## 2025-05-28 RX ADMIN — PROMETHAZINE HYDROCHLORIDE 6.25 MG: 25 INJECTION INTRAMUSCULAR; INTRAVENOUS at 17:43

## 2025-05-28 RX ADMIN — Medication 10 MG: at 12:12

## 2025-05-28 RX ADMIN — ACETAMINOPHEN 650 MG: 325 TABLET ORAL at 19:42

## 2025-05-28 RX ADMIN — DIPHENHYDRAMINE HYDROCHLORIDE 25 MG: 50 INJECTION INTRAMUSCULAR; INTRAVENOUS at 10:24

## 2025-05-28 RX ADMIN — ONDANSETRON 4 MG: 2 INJECTION, SOLUTION INTRAMUSCULAR; INTRAVENOUS at 06:18

## 2025-05-28 RX ADMIN — SODIUM CHLORIDE, PRESERVATIVE FREE 10 ML: 5 INJECTION INTRAVENOUS at 19:29

## 2025-05-28 RX ADMIN — Medication 3 MG: at 19:28

## 2025-05-28 RX ADMIN — LOSARTAN POTASSIUM 25 MG: 50 TABLET, FILM COATED ORAL at 07:46

## 2025-05-28 RX ADMIN — KETOROLAC TROMETHAMINE 15 MG: 15 INJECTION, SOLUTION INTRAMUSCULAR; INTRAVENOUS at 07:45

## 2025-05-28 RX ADMIN — AMLODIPINE BESYLATE 5 MG: 5 TABLET ORAL at 13:21

## 2025-05-28 RX ADMIN — SODIUM CHLORIDE 1000 ML: 0.9 INJECTION, SOLUTION INTRAVENOUS at 06:21

## 2025-05-28 ASSESSMENT — PAIN DESCRIPTION - LOCATION
LOCATION: HEAD
LOCATION: ABDOMEN

## 2025-05-28 ASSESSMENT — PAIN SCALES - GENERAL
PAINLEVEL_OUTOF10: 4
PAINLEVEL_OUTOF10: 6
PAINLEVEL_OUTOF10: 6
PAINLEVEL_OUTOF10: 8
PAINLEVEL_OUTOF10: 0

## 2025-05-28 NOTE — H&P
[facility]     Department of Internal Medicine - Staff Internal Medicine Teaching Service          ADMISSION NOTE/HISTORY AND PHYSICAL EXAMINATION   Date: 5/28/2025  Patient Name: Elizabeth Alex  Date of admission: 5/28/2025  5:48 AM  YOB: 1970  PCP: Gayatri Saldivar APRN - CNP  History Obtained From:  patient, electronic medical record    CHIEF COMPLAINT     Chief complaint: Nausea and vomiting    HISTORY OF PRESENTING ILLNESS     The patient is a pleasant 55 y.o. female with a PMHx significant for     Bipolar disorder  COPD  GERD  Restless leg syndrome  Hypothyroidism  History of polysubstance abuse    presents with a chief complaint of nausea and vomiting.  Patient reported that she started experiencing nausea and vomiting about 1 day before admission.  She mentioned that vomiting was sudden in onset, projectile, bilious and associated nausea.  She declined having any change in appetite or abdominal pain.  She also mentioned having episode of fever and chills day before admission.  Patient came from a treatment facility where she has been for the past 3 days.    Initial vitals in the emergency department revealed hypertension of 179/93 mmHg.  Initial labs were concerning for glucose level of 136, hyperglycemia of 11.1, albumin 5.6, , AST 38.  CBC revealed WBC of 11.8, hemoglobin 15.6.  Urinalysis revealed cloudy urine with many bacteria and trace hemoglobin.  CT scan of abdomen and pelvis with IV contrast revealed mild urinary bladder wall thickening and subtle areas of hypo enhancement bilaterally.  Patient was given Pepcid, ketorolac, Cozaar 25 mg and 1 L of fluid as well as Compazine and Zofran for nausea bolus in the emergency department.  Internal medicine was subsequently consulted for further evaluation management of hypertensive urgency and UTI.    On my evaluation in the emergency department patient was alert and oriented to time, place, person, self.

## 2025-05-28 NOTE — ED PROVIDER NOTES
[facility]  Emergency Department  Emergency Medicine Sign-out     Care of Elizabeth Alex was assumed from Dr. Rosado and is being seen for Abdominal Pain and Vomiting  .  The patient's initial evaluation and plan have been discussed with the prior attending who initially evaluated the patient.     Note start time: 0727    EMERGENCY DEPARTMENT COURSE / MEDICAL DECISION MAKING:       MEDICATIONS GIVEN:  Orders Placed This Encounter   Medications    sodium chloride 0.9 % bolus 1,000 mL    ondansetron (ZOFRAN) injection 4 mg    famotidine (PEPCID) 20 MG/2ML 20 mg in sodium chloride (PF) 0.9 % 10 mL injection    pantoprazole (PROTONIX) 40 mg in sodium chloride (PF) 0.9 % 10 mL injection    promethazine (PHENERGAN) tablet 25 mg    losartan (COZAAR) tablet 25 mg    ketorolac (TORADOL) injection 15 mg    iopamidol (ISOVUE-370) 76 % injection 75 mL    acetaminophen (TYLENOL) tablet 1,000 mg    prochlorperazine (COMPAZINE) injection 10 mg    diphenhydrAMINE (BENADRYL) injection 25 mg    labetalol (NORMODYNE;TRANDATE) injection 10 mg    sodium chloride flush 0.9 % injection 5-40 mL    sodium chloride flush 0.9 % injection 5-40 mL    0.9 % sodium chloride infusion    OR Linked Order Group     potassium chloride (KLOR-CON M) extended release tablet 40 mEq     potassium bicarb-citric acid (EFFER-K) effervescent tablet 40 mEq     potassium chloride 10 mEq/100 mL IVPB (Peripheral Line)    magnesium sulfate 2000 mg in 50 mL IVPB premix    enoxaparin (LOVENOX) injection 40 mg     Indication of Use:   Prophylaxis-DVT/PE    OR Linked Order Group     ondansetron (ZOFRAN-ODT) disintegrating tablet 4 mg     ondansetron (ZOFRAN) injection 4 mg    polyethylene glycol (GLYCOLAX) packet 17 g    OR Linked Order Group     acetaminophen (TYLENOL) tablet 650 mg     acetaminophen (TYLENOL) suppository 650 mg    melatonin tablet 3 mg    DISCONTD: metoprolol (LOPRESSOR) injection 5 mg    labetalol (NORMODYNE;TRANDATE) injection 10 mg     MD  [CP] Giorgio Umana MD       OUTSTANDING TASKS / RECOMMENDATIONS:    Re-evaluate  disposition     FINAL IMPRESSION:     1. Acute nonintractable headache, unspecified headache type    2. Gastroenteritis        DISPOSITION:         DISPOSITION:  []  Discharge   []  Transfer -    [x]  Admission -     []  Against Medical Advice   []  Eloped   FOLLOW-UP: No follow-up provider specified.   DISCHARGE MEDICATIONS: Current Discharge Medication List              Giorgio Umana MD  Emergency Medicine Attending  [facility] STE[facility]        Giorgio Umana MD  05/28/25 2843

## 2025-05-28 NOTE — H&P
Attending Supervising Physician’s Attestation Statement  I have seen and examined Elizabeth Alex and the carey elements of all parts of the encounter have been performed by me.  I agree with the assessment, plan and orders as documented. I discussed the findings and plans with the resident physician and agree as documented in their note .    In addition to the pertinent positives and negatives as stated within HPI and the review of systems as documented in the notes, all other systems were reviewed when able to and are reported negative.    Additional Comments:   Presented with f/c, n/v  Found to have UTI  Started on abx  Follow up cultures  No hx esbl  Monitor wbc  Continue anti emetics     Electronically signed by Reyes Loomis MD on 5/28/2025 at 4:38 PM

## 2025-05-28 NOTE — ED NOTES
Pt presents to the ED with abdominal pain and emesis.  Pt reports she has been throwing up since yesterday.  Pt denies chest pain.  Pt denies any drug or alcohol use today. Pt denies any new foods or drinks.  Pt also reporting intermittent constipation and diarrhea.  Pt denies being around anyone sick.  Pt reports SOB at baseline d/t COPD.    A&Ox4, respirations even and unlabored on RA.

## 2025-05-28 NOTE — ED PROVIDER NOTES
[facility] EMERGENCY DEPARTMENT  Emergency Department Encounter  Emergency Medicine Resident     Pt Name:Elizabeth Alex  MRN: [id number]  Birthdate 1970  Date of evaluation: 5/28/25  PCP:  Gayatri Saldivar APRN - CNP  Note Started: 5:49 AM EDT      CHIEF COMPLAINT       Chief Complaint   Patient presents with    Abdominal Pain    Vomiting       HISTORY OF PRESENT ILLNESS  (Location/Symptom, Timing/Onset, Context/Setting, Quality, Duration, Modifying Factors, Severity.)      Elizabeth Alex is a 55 y.o. female who presents with nausea, vomiting, diarrhea since yesterday afternoon.  Patient reports she was able to tolerate p.o. intake in the morning, ate lunch, and shortly thereafter started experiencing her symptoms.  Patient reports she has the symptoms intermittently, denies any new foods, concern for eating spoiled food, or sick contacts.  Denies fevers, chills, tearing chest pain, shortness of breath.  Does endorse some lightheadedness.    Patient with previous abdominal surgery history.     PAST MEDICAL / SURGICAL / SOCIAL / FAMILY HISTORY      has a past medical history of Abdominal pain, Acute cystitis without hematuria, Acute laryngitis, Anorexia, Anxiety, Anxiety attack, Asthma, Backache, Bipolar disorder (HCC), Cervical radiculopathy, Chest congestion, Chronic back pain, Chronic bronchitis ([facility]), Cocaine abuse (HCC), Complex cyst of left ovary, COPD (chronic obstructive pulmonary disease) (HCC), Depression, Dysfunctional gallbladder, Exposure to influenza, Fatigue, GERD (gastroesophageal reflux disease), Heart palpitations, History of non anemic vitamin B12 deficiency, Hoarseness, chronic, Hypokalemia, Insomnia, Intractable vomiting with nausea, Nausea, Neck pain, Other constipation, Ovarian cyst, Panic attacks, Poor venous access, Productive cough, Restless legs syndrome, Right upper quadrant abdominal pain, RUQ abdominal pain, Severe malnutrition, Thyroid disease, Tobacco use disorder, Wears  Maternal Cousin     Learning Disabilities Maternal Cousin     Diabetes Paternal Cousin     Early Death Paternal Cousin     Obesity Paternal Cousin     Early Death Maternal Cousin     Early Death Paternal Cousin     Learning Disabilities Maternal Cousin        Allergies:  Augmentin [amoxicillin-pot clavulanate] and Codeine    Home Medications:  Prior to Admission medications    Medication Sig Start Date End Date Taking? Authorizing Provider   buprenorphine-naloxone (SUBOXONE) 8-2 MG FILM SL film Place under the tongue. 4/8/25   Yeni Barba MD   DULoxetine (CYMBALTA) 30 MG extended release capsule Take by mouth daily 3/20/25   Yeni Barba MD   gabapentin (NEURONTIN) 300 MG capsule Take 1 capsule by mouth 3 times daily. 3/24/25   Yeni Barba MD   mometasone-formoterol (DULERA) 100-5 MCG/ACT inhaler Inhale 1 puff into the lungs 2 times daily 11/28/23   Dario Michelle APRN - CNP   albuterol sulfate HFA (PROVENTIL;VENTOLIN;PROAIR) 108 (90 Base) MCG/ACT inhaler Inhale 2 puffs into the lungs every 6 hours as needed for Wheezing or Shortness of Breath 11/28/23   Dario Michelle APRN - CNP   bisacodyl 5 MG EC tablet Take 1 tablet by mouth daily as needed for Constipation 11/28/23   Dario Michelle APRN - CNP   polyethylene glycol (GLYCOLAX) 17 GM/SCOOP powder Follow instructions given by provider 11/28/23   Dario Michelle APRN - CNP   levothyroxine (SYNTHROID) 25 MCG tablet TAKE 1 TABLET BY MOUTH AT THE SAME TIME EVERY DAY ON AN EMPTY STOMACH. AVOID DRINKING OR EATING 30-60 MINUTES AFTER TAKING MEDICATION 1/11/23   Altagracia Carey APRN - CNP         REVIEW OF SYSTEMS       Review of Systems   Constitutional:         See HPI for ROS.       PHYSICAL EXAM      INITIAL VITALS:   BP (!) 183/73   Pulse 65   Temp 99.2 °F (37.3 °C) (Oral)   Resp 17   LMP 03/10/2017   SpO2 100%     Physical Exam  Vitals and nursing note reviewed.   Constitutional:       General: She is not in acute distress.     Appearance:

## 2025-05-28 NOTE — ED PROVIDER NOTES
[facility]     Emergency Department     Faculty Attestation    I performed a history and physical examination of the patient and discussed management with the resident. I have reviewed and agree with the resident’s findings including all diagnostic interpretations, and treatment plans as written. Any areas of disagreement are noted on the chart. I was personally present for the key portions of any procedures. I have documented in the chart those procedures where I was not present during the key portions. I have reviewed the emergency nurses triage note. I agree with the chief complaint, past medical history, past surgical history, allergies, medications, social and family history as documented unless otherwise noted below. Documentation of the HPI, Physical Exam and Medical Decision Making performed by scriblilliana is based on my personal performance of the HPI, PE and MDM. For Physician Assistant/ Nurse Practitioner cases/documentation I have personally evaluated this patient and have completed at least one if not all key elements of the E/M (history, physical exam, and MDM). Additional findings are as noted.    Note Started: 6:08 AM EDT     56 yo F vomit / diarrhea, abdominal pain, no cp, no fever, no injury   PE Thania RN present, hypertensive, gcs 15  Abdomen minimal diffuse tenderness, no distension, no rigidity, no guarding, no mass  ---needing recheck, meds / iv fluids, needing recheck, day shift sign out  EKG Interpretation    Interpreted by me  Sinus rhythm, arrhythmia, hr 64, no st elevation, normal axis, qtc 435  -similar to ecg from 5-1-25    CRITICAL CARE: There was a high probability of clinically significant/life threatening deterioration in this patient's condition which required my urgent intervention.  Total critical care time was 0 minutes.  This excludes any time for separately reportable procedures.       Hao Raza,

## 2025-05-28 NOTE — ED NOTES
ED to inpatient nurses report      Chief Complaint:  Chief Complaint   Patient presents with    Abdominal Pain    Vomiting     Present to ED from: [facility]     MOA:     LOC: alert and orientated to name, place, date  Mobility: Independent  Oxygen Baseline:98%    Current needs required: room air    Pending ED orders: blood culture and antibiotics, phlebotomy to get second BC   Present condition: stable     Why did the patient come to the ED? Abdominal pain and vomiting   What is the plan? Treat hypertension and UTI   Any procedures or intervention occur? Medications   Any safety concerns?? No     Mental Status:       Psych Assessment:   Psychosocial  Psychosocial (WDL): Within Defined Limits  Vital signs   Vitals:    05/28/25 1313 05/28/25 1321 05/28/25 1341 05/28/25 1349   BP:  (!) 170/84  (!) 174/93   Pulse: 62  65    Resp: 15  17    Temp:       TempSrc:       SpO2: 100%  98%         Vitals:  Patient Vitals for the past 24 hrs:   BP Temp Temp src Pulse Resp SpO2   05/28/25 1349 (!) 174/93 -- -- -- -- --   05/28/25 1341 -- -- -- 65 17 98 %   05/28/25 1321 (!) 170/84 -- -- -- -- --   05/28/25 1313 -- -- -- 62 15 100 %   05/28/25 1311 (!) 170/104 -- -- 64 16 100 %   05/28/25 1243 (!) 205/146 -- -- 77 16 98 %   05/28/25 1241 -- -- -- 87 19 98 %   05/28/25 1213 (!) 169/89 -- -- 68 21 99 %   05/28/25 1211 -- -- -- 71 18 99 %   05/28/25 1143 (!) 178/80 -- -- 63 22 98 %   05/28/25 1141 -- -- -- 63 20 99 %   05/28/25 1130 -- -- -- 96 22 98 %   05/28/25 1113 (!) 175/77 -- -- 74 23 100 %   05/28/25 1111 -- -- -- 67 24 100 %   05/28/25 1100 (!) 194/96 -- -- 65 19 100 %   05/28/25 1043 (!) 172/89 -- -- 91 (!) 32 97 %   05/28/25 1041 -- -- -- 79 18 98 %   05/28/25 1030 (!) 149/86 -- -- 87 17 98 %   05/28/25 1013 (!) 163/83 -- -- 70 12 99 %   05/28/25 1011 -- -- -- 65 21 99 %   05/28/25 1000 (!) 158/71 -- -- 79 19 100 %   05/28/25 0943 (!) 161/74 -- -- 67 16 98 %   05/28/25 0941 -- -- -- 94 19 100 %   05/28/25 0930 (!)

## 2025-05-29 LAB
ANION GAP SERPL CALCULATED.3IONS-SCNC: 16 MMOL/L (ref 9–16)
BASOPHILS # BLD: 0.04 K/UL (ref 0–0.2)
BASOPHILS NFR BLD: 0 % (ref 0–2)
BUN SERPL-MCNC: 15 MG/DL (ref 6–20)
CALCIUM SERPL-MCNC: 9.6 MG/DL (ref 8.6–10.4)
CHLORIDE SERPL-SCNC: 103 MMOL/L (ref 98–107)
CO2 SERPL-SCNC: 22 MMOL/L (ref 20–31)
CREAT SERPL-MCNC: 0.7 MG/DL (ref 0.6–0.9)
EKG ATRIAL RATE: 64 BPM
EKG P AXIS: 8 DEGREES
EKG P-R INTERVAL: 120 MS
EKG Q-T INTERVAL: 422 MS
EKG QRS DURATION: 94 MS
EKG QTC CALCULATION (BAZETT): 435 MS
EKG R AXIS: 78 DEGREES
EKG T AXIS: 59 DEGREES
EKG VENTRICULAR RATE: 64 BPM
EOSINOPHIL # BLD: <0.03 K/UL (ref 0–0.44)
EOSINOPHILS RELATIVE PERCENT: 0 % (ref 1–4)
ERYTHROCYTE [DISTWIDTH] IN BLOOD BY AUTOMATED COUNT: 12.4 % (ref 11.8–14.4)
GFR, ESTIMATED: >90 ML/MIN/1.73M2
GLUCOSE BLD-MCNC: 111 MG/DL (ref 65–105)
GLUCOSE BLD-MCNC: 117 MG/DL (ref 65–105)
GLUCOSE SERPL-MCNC: 113 MG/DL (ref 74–99)
HCT VFR BLD AUTO: 44 % (ref 36.3–47.1)
HGB BLD-MCNC: 14.6 G/DL (ref 11.9–15.1)
IMM GRANULOCYTES # BLD AUTO: 0.06 K/UL (ref 0–0.3)
IMM GRANULOCYTES NFR BLD: 1 %
LYMPHOCYTES NFR BLD: 2.11 K/UL (ref 1.1–3.7)
LYMPHOCYTES RELATIVE PERCENT: 17 % (ref 24–43)
MCH RBC QN AUTO: 30.3 PG (ref 25.2–33.5)
MCHC RBC AUTO-ENTMCNC: 33.2 G/DL (ref 28.4–34.8)
MCV RBC AUTO: 91.3 FL (ref 82.6–102.9)
MONOCYTES NFR BLD: 0.91 K/UL (ref 0.1–1.2)
MONOCYTES NFR BLD: 7 % (ref 3–12)
NEUTROPHILS NFR BLD: 75 % (ref 36–65)
NEUTS SEG NFR BLD: 9.57 K/UL (ref 1.5–8.1)
NRBC BLD-RTO: 0 PER 100 WBC
PLATELET # BLD AUTO: 255 K/UL (ref 138–453)
PMV BLD AUTO: 11 FL (ref 8.1–13.5)
POTASSIUM SERPL-SCNC: 3.6 MMOL/L (ref 3.7–5.3)
RBC # BLD AUTO: 4.82 M/UL (ref 3.95–5.11)
SODIUM SERPL-SCNC: 141 MMOL/L (ref 136–145)
WBC OTHER # BLD: 12.7 K/UL (ref 3.5–11.3)

## 2025-05-29 PROCEDURE — 85025 COMPLETE CBC W/AUTO DIFF WBC: CPT

## 2025-05-29 PROCEDURE — 80048 BASIC METABOLIC PNL TOTAL CA: CPT

## 2025-05-29 PROCEDURE — 6360000002 HC RX W HCPCS: Performed by: INTERNAL MEDICINE

## 2025-05-29 PROCEDURE — 6360000002 HC RX W HCPCS

## 2025-05-29 PROCEDURE — 6370000000 HC RX 637 (ALT 250 FOR IP)

## 2025-05-29 PROCEDURE — 2500000003 HC RX 250 WO HCPCS

## 2025-05-29 PROCEDURE — 82947 ASSAY GLUCOSE BLOOD QUANT: CPT

## 2025-05-29 PROCEDURE — 99232 SBSQ HOSP IP/OBS MODERATE 35: CPT | Performed by: INTERNAL MEDICINE

## 2025-05-29 PROCEDURE — 93010 ELECTROCARDIOGRAM REPORT: CPT | Performed by: INTERNAL MEDICINE

## 2025-05-29 PROCEDURE — 2060000000 HC ICU INTERMEDIATE R&B

## 2025-05-29 PROCEDURE — 36415 COLL VENOUS BLD VENIPUNCTURE: CPT

## 2025-05-29 RX ORDER — POTASSIUM CHLORIDE 1500 MG/1
40 TABLET, EXTENDED RELEASE ORAL ONCE
Status: COMPLETED | OUTPATIENT
Start: 2025-05-29 | End: 2025-05-29

## 2025-05-29 RX ORDER — HYDROCODONE BITARTRATE AND ACETAMINOPHEN 5; 325 MG/1; MG/1
1 TABLET ORAL ONCE
Status: DISCONTINUED | OUTPATIENT
Start: 2025-05-29 | End: 2025-05-30

## 2025-05-29 RX ORDER — LOSARTAN POTASSIUM 50 MG/1
50 TABLET ORAL DAILY
Status: DISCONTINUED | OUTPATIENT
Start: 2025-05-29 | End: 2025-05-30

## 2025-05-29 RX ADMIN — PROMETHAZINE HYDROCHLORIDE 6.25 MG: 25 INJECTION INTRAMUSCULAR; INTRAVENOUS at 21:18

## 2025-05-29 RX ADMIN — Medication 3 MG: at 19:55

## 2025-05-29 RX ADMIN — GABAPENTIN 300 MG: 300 CAPSULE ORAL at 19:55

## 2025-05-29 RX ADMIN — ACETAMINOPHEN 650 MG: 325 TABLET ORAL at 19:55

## 2025-05-29 RX ADMIN — ONDANSETRON 4 MG: 2 INJECTION INTRAMUSCULAR; INTRAVENOUS at 22:28

## 2025-05-29 RX ADMIN — GABAPENTIN 300 MG: 300 CAPSULE ORAL at 12:58

## 2025-05-29 RX ADMIN — DULOXETINE HYDROCHLORIDE 30 MG: 30 CAPSULE, DELAYED RELEASE ORAL at 09:42

## 2025-05-29 RX ADMIN — GABAPENTIN 300 MG: 300 CAPSULE ORAL at 09:42

## 2025-05-29 RX ADMIN — ONDANSETRON 4 MG: 2 INJECTION INTRAMUSCULAR; INTRAVENOUS at 06:40

## 2025-05-29 RX ADMIN — PROMETHAZINE HYDROCHLORIDE 6.25 MG: 25 INJECTION INTRAMUSCULAR; INTRAVENOUS at 10:44

## 2025-05-29 RX ADMIN — PROMETHAZINE HYDROCHLORIDE 6.25 MG: 25 INJECTION INTRAMUSCULAR; INTRAVENOUS at 02:33

## 2025-05-29 RX ADMIN — SODIUM CHLORIDE, PRESERVATIVE FREE 10 ML: 5 INJECTION INTRAVENOUS at 19:55

## 2025-05-29 RX ADMIN — POTASSIUM CHLORIDE 40 MEQ: 1500 TABLET, EXTENDED RELEASE ORAL at 06:37

## 2025-05-29 RX ADMIN — AMLODIPINE BESYLATE 5 MG: 5 TABLET ORAL at 09:43

## 2025-05-29 RX ADMIN — SODIUM CHLORIDE, PRESERVATIVE FREE 10 ML: 5 INJECTION INTRAVENOUS at 09:43

## 2025-05-29 RX ADMIN — WATER 1000 MG: 1 INJECTION INTRAMUSCULAR; INTRAVENOUS; SUBCUTANEOUS at 12:57

## 2025-05-29 RX ADMIN — HYDRALAZINE HYDROCHLORIDE 10 MG: 20 INJECTION INTRAMUSCULAR; INTRAVENOUS at 21:24

## 2025-05-29 RX ADMIN — ONDANSETRON 4 MG: 4 TABLET, ORALLY DISINTEGRATING ORAL at 13:04

## 2025-05-29 RX ADMIN — ENOXAPARIN SODIUM 40 MG: 100 INJECTION SUBCUTANEOUS at 09:42

## 2025-05-29 RX ADMIN — Medication 10 MG: at 19:55

## 2025-05-29 RX ADMIN — ACETAMINOPHEN 650 MG: 325 TABLET ORAL at 09:59

## 2025-05-29 RX ADMIN — LOSARTAN POTASSIUM 50 MG: 50 TABLET, FILM COATED ORAL at 09:42

## 2025-05-29 RX ADMIN — HYDRALAZINE HYDROCHLORIDE 10 MG: 20 INJECTION INTRAMUSCULAR; INTRAVENOUS at 12:57

## 2025-05-29 ASSESSMENT — PAIN DESCRIPTION - DESCRIPTORS
DESCRIPTORS: ACHING
DESCRIPTORS: ACHING;DISCOMFORT
DESCRIPTORS: ACHING

## 2025-05-29 ASSESSMENT — PAIN DESCRIPTION - LOCATION
LOCATION: HEAD
LOCATION: HEAD;EAR
LOCATION: HEAD;EAR

## 2025-05-29 ASSESSMENT — PAIN SCALES - GENERAL
PAINLEVEL_OUTOF10: 1
PAINLEVEL_OUTOF10: 7
PAINLEVEL_OUTOF10: 7
PAINLEVEL_OUTOF10: 6
PAINLEVEL_OUTOF10: 7
PAINLEVEL_OUTOF10: 5

## 2025-05-29 ASSESSMENT — PAIN DESCRIPTION - ORIENTATION
ORIENTATION: ANTERIOR
ORIENTATION: RIGHT;LEFT
ORIENTATION: RIGHT;LEFT

## 2025-05-29 ASSESSMENT — PAIN SCALES - WONG BAKER
WONGBAKER_NUMERICALRESPONSE: NO HURT
WONGBAKER_NUMERICALRESPONSE: NO HURT

## 2025-05-29 NOTE — PLAN OF CARE
Problem: Pain  Goal: Verbalizes/displays adequate comfort level or baseline comfort level  Flowsheets (Taken 5/29/2025 0458)  Verbalizes/displays adequate comfort level or baseline comfort level:   Assess pain using appropriate pain scale   Administer analgesics based on type and severity of pain and evaluate response   Encourage patient to monitor pain and request assistance   Consider cultural and social influences on pain and pain management   Implement non-pharmacological measures as appropriate and evaluate response   Notify Licensed Independent Practitioner if interventions unsuccessful or patient reports new pain     Problem: Discharge Planning  Goal: Discharge to home or other facility with appropriate resources  Outcome: Progressing  Flowsheets (Taken 5/29/2025 0458)  Discharge to home or other facility with appropriate resources:   Identify barriers to discharge with patient and caregiver   Identify discharge learning needs (meds, wound care, etc)   Arrange for needed discharge resources and transportation as appropriate   Arrange for interpreters to assist at discharge as needed   Refer to discharge planning if patient needs post-hospital services based on physician order or complex needs related to functional status, cognitive ability or social support system     Problem: ABCDS Injury Assessment  Goal: Absence of physical injury  Outcome: Progressing  Flowsheets (Taken 5/29/2025 0458)  Absence of Physical Injury: Implement safety measures based on patient assessment      Reinaldo Burk)  Surgery  [address], [address]  [place] [address]  Phone: [phone]  Fax: [phone]  Follow Up Time: 1 week

## 2025-05-29 NOTE — PROGRESS NOTES
[facility]  Internal Medicine Teaching Residency Program  Inpatient Daily Progress Note  ______________________________________________________________________________    Patient: Elziabeth Alex  YOB: 1970   MRN:[id number]    Acct: [id number]     Room: [facility]0431-01  Admit date: 5/28/2025  Today's date: 05/29/25  Number of days in the hospital: 1    SUBJECTIVE   Admitting Diagnosis: Complicated urinary tract infection    CC: Nausea and vomiting     Pt examined at bedside. Chart & results reviewed.   No acute events overnight.  AOx4.  Hemodynamically stable with 166/99 mmHg maintaining saturation at around 100% on room air.  Patient on IV Rocephin.    Morning labs reviewed, BMP revealed hypokalemia of 3.6, glucose 113.  CBC revealed leukocytosis of 12.7.      ROS:  Constitutional:  negative for chills, fevers, sweats  Respiratory:  negative for cough, dyspnea on exertion, hemoptysis, shortness of breath, wheezing  Cardiovascular:  negative for chest pain, chest pressure/discomfort, lower extremity edema, palpitations  Gastrointestinal:  negative for abdominal pain, constipation, diarrhea, nausea, vomiting  Neurological:  negative for dizziness, headache    Plan :-    Will continue patient on antibiotics for complicated UTI.  Will replace electrolytes as needed.    BRIEF HISTORY     The patient is a pleasant 55 y.o. female with a PMHx significant for      Bipolar disorder  COPD  GERD  Restless leg syndrome  Hypothyroidism  History of polysubstance abuse     presents with a chief complaint of nausea and vomiting.  Patient reported that she started experiencing nausea and vomiting about 1 day before admission.  She mentioned that vomiting was sudden in onset, projectile, bilious and associated nausea.  She declined having any change in appetite or abdominal pain.  She also mentioned having episode of fever and chills day before admission.  Patient came from a  rhonchi.   Abdominal:      General: Bowel sounds are normal. There is no distension.      Tenderness: There is no abdominal tenderness.      Hernia: No hernia is present.   Musculoskeletal:      Right lower leg: No edema.      Left lower leg: No edema.   Skin:     Coloration: Skin is not jaundiced.   Neurological:      Mental Status: She is alert and oriented to person, place, and time.           Medications:  Scheduled Medications:    sodium chloride flush  5-40 mL IntraVENous 2 times per day    enoxaparin  40 mg SubCUTAneous Daily    losartan  25 mg Oral Daily    cefTRIAXone (ROCEPHIN) IV  1,000 mg IntraVENous Q24H    amLODIPine  5 mg Oral Daily    DULoxetine  30 mg Oral Daily    gabapentin  300 mg Oral TID     Continuous Infusions:    sodium chloride       PRN Medicationssodium chloride flush, 5-40 mL, PRN  sodium chloride, , PRN  potassium chloride, 40 mEq, PRN   Or  potassium alternative oral replacement, 40 mEq, PRN   Or  potassium chloride, 10 mEq, PRN  magnesium sulfate, 2,000 mg, PRN  ondansetron, 4 mg, Q8H PRN   Or  ondansetron, 4 mg, Q6H PRN  polyethylene glycol, 17 g, Daily PRN  acetaminophen, 650 mg, Q6H PRN   Or  acetaminophen, 650 mg, Q6H PRN  melatonin, 3 mg, Nightly PRN  labetalol, 10 mg, Q6H PRN  hydrALAZINE, 10 mg, Q6H PRN  promethazine, 6.25 mg, Q6H PRN  ipratropium 0.5 mg-albuterol 2.5 mg, 1 Dose, Q4H PRN        Diagnostic Labs:  CBC:   Recent Labs     05/28/25  0609   WBC 11.8*   RBC 5.14*   HGB 15.6*   HCT 45.5   MCV 88.5   RDW 12.2        BMP:   Recent Labs     05/28/25  0609      K 4.1   CL 99   CO2 24   BUN 16   CREATININE 0.9     BNP: No results for input(s): \"BNP\" in the last 72 hours.  PT/INR: No results for input(s): \"PROTIME\", \"INR\" in the last 72 hours.  APTT: No results for input(s): \"APTT\" in the last 72 hours.  CARDIAC ENZYMES: No results for input(s): \"CKMB\", \"CKMBINDEX\", \"TROPONINI\" in the last 72 hours.    Invalid input(s): \"CKTOTAL;3\"  FASTING LIPID PANEL:  Lab

## 2025-05-29 NOTE — CARE COORDINATION
Case Management Assessment  Initial Evaluation    Date/Time of Evaluation: 5/29/2025 6:18 PM  Assessment Completed by: Peri Oh RN    If patient is discharged prior to next notation, then this note serves as note for discharge by case management.    Patient Name: Elizabeth Alex                   YOB: 1970  Diagnosis: Gastroenteritis [K52.9]  UTI (urinary tract infection) [N39.0]  Acute nonintractable headache, unspecified headache type [R51.9]                   Date / Time: 5/28/2025  5:48 AM    Patient Admission Status: Inpatient   Readmission Risk (Low < 19, Mod (19-27), High > 27): Readmission Risk Score: 10.4    Current PCP: Gayatri Saldivar APRN - CNP  PCP verified by CM? Yes    Chart Reviewed: Yes      History Provided by: Patient  Patient Orientation: Alert and Oriented, Person, Place, Situation    Patient Cognition: Alert    Hospitalization in the last 30 days (Readmission):  No    If yes, Readmission Assessment in CM Navigator will be completed.    Advance Directives:      Code Status: Full Code   Patient's Primary Decision Maker is: Legal Next of Kin      Discharge Planning:    Patient lives with: Other (Comment) Type of Home: Correction[facility]  Primary Care Giver: Self  Patient Support Systems include: Spouse/Significant Other   Current Financial resources: Medicaid  Current community resources:    Current services prior to admission: None            Current DME:              Type of Home Care services:  None    ADLS  Prior functional level: Independent in ADLs/IADLs  Current functional level: Independent in ADLs/IADLs    PT AM-PAC:   /24  OT AM-PAC:   /24    Family can provide assistance at DC: Yes  Would you like Case Management to discuss the discharge plan with any other family members/significant others, and if so, who? No  Plans to Return to Present Housing: Yes  Other Identified Issues/Barriers to RETURNING to current housing: na  Potential Assistance needed at  discharge: N/A            Potential DME:    Patient expects to discharge to: Behavioral Health/Substance/Detox  Plan for transportation at discharge: Other (see comment) (facility)    Financial    Pay[place]: [employer] [place] / Plan: [place] / Product Type: *No Product type* /     Does insurance require precert for SNF: Yes    Potential assistance Purchasing Medications: No  Meds-to-Beds request: Yes      [id number] - [place], [place] - [address] - P [phone] - F [phone]  [address] [place], [place] [address] [address]  Phone: [phone] Fax: [phone]      Notes:    Factors facilitating achievement of predicted outcomes: Motivated, Cooperative, and Pleasant    Barriers to discharge: Medical complications    Additional Case Management Notes: met with patient to discuss transitional planning. Plan is to return to \"correctional treatment facility\". Patient states she has phone number to call them when she is discharged and they will provide transport for her to return.     The Plan for Transition of Care is related to the following treatment goals of Gastroenteritis [K52.9]  UTI (urinary tract infection) [N39.0]  Acute nonintractable headache, unspecified headache type [R51.9]    IF APPLICABLE: The Patient and/or patient representative Elizabeth and her family were provided with a choice of provider and agrees with the discharge plan. Freedom of choice list with basic dialogue that supports the patient's individualized plan of care/goals and shares the quality data associated with the providers was provided to: Patient   Patient Representative Name:       The Patient and/or Patient Representative Agree with the Discharge Plan? Yes    Peri Oh RN  Case Management Department  Ph: [phone] Fax: na

## 2025-05-29 NOTE — PROGRESS NOTES
[facility]  Occupational Therapy Not Seen Note    DATE: 2025    NAME: Elizabeth Alex  MRN: [id number]   : 1970      Patient not seen this date for Occupational Therapy due to:    Patient independent with ADLs and functional tasks with no acute OT needs. Will defer OT evaluation at this time. Please reorder OT if future needs arise.       Electronically signed by Doris Naranjo OT on 2025 at 8:42 AM

## 2025-05-29 NOTE — PROGRESS NOTES
Physical Therapy        Physical Therapy Cancel Note      DATE: 2025    NAME: Elizabeth Alex  MRN: [id number]   : 1970      Patient not seen this date for Physical Therapy due to:    Per patient, she is independent with mobility. No need for therapy simran SPICER PT.      Electronically signed by Gabriella Bojorquez PT on 2025 at 12:42 PM

## 2025-05-30 LAB
ANION GAP SERPL CALCULATED.3IONS-SCNC: 15 MMOL/L (ref 9–16)
BASOPHILS # BLD: 0.04 K/UL (ref 0–0.2)
BASOPHILS NFR BLD: 0 % (ref 0–2)
BUN SERPL-MCNC: 19 MG/DL (ref 6–20)
CALCIUM SERPL-MCNC: 9.8 MG/DL (ref 8.6–10.4)
CHLORIDE SERPL-SCNC: 99 MMOL/L (ref 98–107)
CO2 SERPL-SCNC: 23 MMOL/L (ref 20–31)
CREAT SERPL-MCNC: 0.7 MG/DL (ref 0.6–0.9)
EOSINOPHIL # BLD: <0.03 K/UL (ref 0–0.44)
EOSINOPHILS RELATIVE PERCENT: 0 % (ref 1–4)
ERYTHROCYTE [DISTWIDTH] IN BLOOD BY AUTOMATED COUNT: 12.5 % (ref 11.8–14.4)
GFR, ESTIMATED: >90 ML/MIN/1.73M2
GLUCOSE BLD-MCNC: 135 MG/DL (ref 65–105)
GLUCOSE BLD-MCNC: 141 MG/DL (ref 65–105)
GLUCOSE BLD-MCNC: 98 MG/DL (ref 65–105)
GLUCOSE SERPL-MCNC: 115 MG/DL (ref 74–99)
HCT VFR BLD AUTO: 44.7 % (ref 36.3–47.1)
HGB BLD-MCNC: 14.7 G/DL (ref 11.9–15.1)
IMM GRANULOCYTES # BLD AUTO: 0.04 K/UL (ref 0–0.3)
IMM GRANULOCYTES NFR BLD: 0 %
LYMPHOCYTES NFR BLD: 1.81 K/UL (ref 1.1–3.7)
LYMPHOCYTES RELATIVE PERCENT: 14 % (ref 24–43)
MCH RBC QN AUTO: 30.4 PG (ref 25.2–33.5)
MCHC RBC AUTO-ENTMCNC: 32.9 G/DL (ref 28.4–34.8)
MCV RBC AUTO: 92.5 FL (ref 82.6–102.9)
MICROORGANISM SPEC CULT: ABNORMAL
MONOCYTES NFR BLD: 0.98 K/UL (ref 0.1–1.2)
MONOCYTES NFR BLD: 8 % (ref 3–12)
NEUTROPHILS NFR BLD: 78 % (ref 36–65)
NEUTS SEG NFR BLD: 9.91 K/UL (ref 1.5–8.1)
NRBC BLD-RTO: 0 PER 100 WBC
PLATELET # BLD AUTO: 312 K/UL (ref 138–453)
PMV BLD AUTO: 11.1 FL (ref 8.1–13.5)
POTASSIUM SERPL-SCNC: 4.1 MMOL/L (ref 3.7–5.3)
RBC # BLD AUTO: 4.83 M/UL (ref 3.95–5.11)
SERVICE CMNT-IMP: ABNORMAL
SODIUM SERPL-SCNC: 137 MMOL/L (ref 136–145)
SPECIMEN DESCRIPTION: ABNORMAL
TROPONIN I SERPL HS-MCNC: 11 NG/L (ref 0–14)
TROPONIN I SERPL HS-MCNC: 19 NG/L (ref 0–14)
TROPONIN I SERPL HS-MCNC: 19 NG/L (ref 0–14)
TROPONIN I SERPL HS-MCNC: 24 NG/L (ref 0–14)
WBC OTHER # BLD: 12.8 K/UL (ref 3.5–11.3)

## 2025-05-30 PROCEDURE — 84484 ASSAY OF TROPONIN QUANT: CPT

## 2025-05-30 PROCEDURE — 6370000000 HC RX 637 (ALT 250 FOR IP)

## 2025-05-30 PROCEDURE — 36415 COLL VENOUS BLD VENIPUNCTURE: CPT

## 2025-05-30 PROCEDURE — 82947 ASSAY GLUCOSE BLOOD QUANT: CPT

## 2025-05-30 PROCEDURE — 6360000002 HC RX W HCPCS: Performed by: INTERNAL MEDICINE

## 2025-05-30 PROCEDURE — 93005 ELECTROCARDIOGRAM TRACING: CPT

## 2025-05-30 PROCEDURE — 80048 BASIC METABOLIC PNL TOTAL CA: CPT

## 2025-05-30 PROCEDURE — 6360000002 HC RX W HCPCS

## 2025-05-30 PROCEDURE — 85025 COMPLETE CBC W/AUTO DIFF WBC: CPT

## 2025-05-30 PROCEDURE — 2500000003 HC RX 250 WO HCPCS

## 2025-05-30 PROCEDURE — 2060000000 HC ICU INTERMEDIATE R&B

## 2025-05-30 PROCEDURE — 99232 SBSQ HOSP IP/OBS MODERATE 35: CPT | Performed by: INTERNAL MEDICINE

## 2025-05-30 RX ORDER — PROCHLORPERAZINE EDISYLATE 5 MG/ML
10 INJECTION INTRAMUSCULAR; INTRAVENOUS EVERY 6 HOURS PRN
Status: DISCONTINUED | OUTPATIENT
Start: 2025-05-30 | End: 2025-05-31 | Stop reason: HOSPADM

## 2025-05-30 RX ORDER — KETOROLAC TROMETHAMINE 15 MG/ML
15 INJECTION, SOLUTION INTRAMUSCULAR; INTRAVENOUS
Status: COMPLETED | OUTPATIENT
Start: 2025-05-30 | End: 2025-05-31

## 2025-05-30 RX ORDER — AMLODIPINE BESYLATE 5 MG/1
5 TABLET ORAL DAILY
Status: DISCONTINUED | OUTPATIENT
Start: 2025-05-31 | End: 2025-05-31 | Stop reason: HOSPADM

## 2025-05-30 RX ORDER — LOSARTAN POTASSIUM 50 MG/1
25 TABLET ORAL DAILY
Status: DISCONTINUED | OUTPATIENT
Start: 2025-05-31 | End: 2025-05-31 | Stop reason: HOSPADM

## 2025-05-30 RX ORDER — AMLODIPINE BESYLATE 10 MG/1
10 TABLET ORAL DAILY
Status: DISCONTINUED | OUTPATIENT
Start: 2025-05-30 | End: 2025-05-30

## 2025-05-30 RX ORDER — LABETALOL HYDROCHLORIDE 5 MG/ML
10 INJECTION, SOLUTION INTRAVENOUS EVERY 6 HOURS PRN
Status: DISCONTINUED | OUTPATIENT
Start: 2025-05-30 | End: 2025-05-31 | Stop reason: HOSPADM

## 2025-05-30 RX ADMIN — PROCHLORPERAZINE EDISYLATE 10 MG: 5 INJECTION INTRAMUSCULAR; INTRAVENOUS at 21:12

## 2025-05-30 RX ADMIN — LOSARTAN POTASSIUM 50 MG: 50 TABLET, FILM COATED ORAL at 08:00

## 2025-05-30 RX ADMIN — GABAPENTIN 300 MG: 300 CAPSULE ORAL at 20:35

## 2025-05-30 RX ADMIN — ACETAMINOPHEN, ASPIRIN, CAFFEINE 1 TABLET: 250; 250; 65 TABLET, FILM COATED ORAL at 20:40

## 2025-05-30 RX ADMIN — SODIUM CHLORIDE, PRESERVATIVE FREE 10 ML: 5 INJECTION INTRAVENOUS at 20:35

## 2025-05-30 RX ADMIN — GABAPENTIN 300 MG: 300 CAPSULE ORAL at 08:00

## 2025-05-30 RX ADMIN — GABAPENTIN 300 MG: 300 CAPSULE ORAL at 13:36

## 2025-05-30 RX ADMIN — SODIUM CHLORIDE, PRESERVATIVE FREE 10 ML: 5 INJECTION INTRAVENOUS at 08:00

## 2025-05-30 RX ADMIN — DULOXETINE HYDROCHLORIDE 30 MG: 30 CAPSULE, DELAYED RELEASE ORAL at 08:00

## 2025-05-30 RX ADMIN — WATER 1000 MG: 1 INJECTION INTRAMUSCULAR; INTRAVENOUS; SUBCUTANEOUS at 12:09

## 2025-05-30 RX ADMIN — ACETAMINOPHEN 650 MG: 325 TABLET ORAL at 02:22

## 2025-05-30 RX ADMIN — ENOXAPARIN SODIUM 40 MG: 100 INJECTION SUBCUTANEOUS at 08:00

## 2025-05-30 RX ADMIN — PROMETHAZINE HYDROCHLORIDE 6.25 MG: 25 INJECTION INTRAMUSCULAR; INTRAVENOUS at 04:45

## 2025-05-30 RX ADMIN — Medication 3 MG: at 22:44

## 2025-05-30 RX ADMIN — HYDRALAZINE HYDROCHLORIDE 10 MG: 20 INJECTION INTRAMUSCULAR; INTRAVENOUS at 08:00

## 2025-05-30 RX ADMIN — ACETAMINOPHEN, ASPIRIN, CAFFEINE 1 TABLET: 250; 250; 65 TABLET, FILM COATED ORAL at 12:09

## 2025-05-30 RX ADMIN — AMLODIPINE BESYLATE 10 MG: 10 TABLET ORAL at 08:00

## 2025-05-30 ASSESSMENT — PAIN SCALES - GENERAL
PAINLEVEL_OUTOF10: 4
PAINLEVEL_OUTOF10: 9
PAINLEVEL_OUTOF10: 0
PAINLEVEL_OUTOF10: 7
PAINLEVEL_OUTOF10: 7
PAINLEVEL_OUTOF10: 5
PAINLEVEL_OUTOF10: 9

## 2025-05-30 ASSESSMENT — PAIN DESCRIPTION - DESCRIPTORS: DESCRIPTORS: ACHING

## 2025-05-30 ASSESSMENT — PAIN SCALES - WONG BAKER: WONGBAKER_NUMERICALRESPONSE: NO HURT

## 2025-05-30 ASSESSMENT — PAIN DESCRIPTION - LOCATION: LOCATION: HEAD

## 2025-05-30 NOTE — PROGRESS NOTES
[facility]  Internal Medicine Teaching Residency Program  Inpatient Daily Progress Note  ______________________________________________________________________________    Patient: Elizabeth Alex  YOB: 1970   MRN:[id number]    Acct: [id number]     Room: [facility]0431-01  Admit date: 5/28/2025  Today's date: 05/30/25  Number of days in the hospital: 2    SUBJECTIVE   Admitting Diagnosis: Complicated urinary tract infection    CC: Nausea and vomiting     Pt examined at bedside. Chart & results reviewed.   No acute events overnight.  AOx4.  Hemodynamically stable with 110/73 mmHg maintaining saturation at around 96% on room air.  Patient is complaining of headache and chest pressure.  Patient on IV Rocephin.    Morning labs reviewed, BMP revealed glucose 115.  CBC revealed leukocytosis of 12.8.    Urine culture is showing growth of gram-negative rods.    ROS:  Constitutional:  negative for chills, fevers, sweats  Respiratory:  negative for cough, dyspnea on exertion, hemoptysis, shortness of breath, wheezing  Cardiovascular:  negative for chest pain, chest pressure/discomfort, lower extremity edema, palpitations  Gastrointestinal:  negative for abdominal pain, constipation, diarrhea, nausea, vomiting  Neurological:  negative for dizziness, headache    Plan :-    Will continue patient on antibiotics for complicated UTI.  Will follow-up on final urine culture results.  Will replace electrolytes as needed.  Will start patient on Toradol and obtain EKG and troponin.    BRIEF HISTORY     The patient is a pleasant 55 y.o. female with a PMHx significant for      Bipolar disorder  COPD  GERD  Restless leg syndrome  Hypothyroidism  History of polysubstance abuse     presents with a chief complaint of nausea and vomiting.  Patient reported that she started experiencing nausea and vomiting about 1 day before admission.  She mentioned that vomiting was sudden in onset,  103   CO2 24 22   BUN 16 15   CREATININE 0.9 0.7     BNP: No results for input(s): \"BNP\" in the last 72 hours.  PT/INR: No results for input(s): \"PROTIME\", \"INR\" in the last 72 hours.  APTT: No results for input(s): \"APTT\" in the last 72 hours.  CARDIAC ENZYMES: No results for input(s): \"CKMB\", \"CKMBINDEX\", \"TROPONINI\" in the last 72 hours.    Invalid input(s): \"CKTOTAL;3\"  FASTING LIPID PANEL:  Lab Results   Component Value Date    CHOL 145 04/22/2025    HDL 71 04/22/2025    TRIG 34 04/22/2025     LIVER PROFILE:   Recent Labs     05/28/25  0609   AST 38*   ALT 35   BILITOT 0.4   ALKPHOS 137*      MICROBIOLOGY:   Lab Results   Component Value Date/Time    CULTURE GRAM NEGATIVE RODS >100,000 CFU/ML (A) 05/28/2025 11:54 PM       Imaging:    CT ABDOMEN PELVIS W IV CONTRAST Additional Contrast? None  Result Date: 5/28/2025  Mild urinary bladder wall thickening and subtle areas of hypoenhancement within the renal cortex bilaterally that may relate to an infectious etiology and correlation with urinalysis is needed.       ASSESSMENT & PLAN     Assessment and Plan:    IMPRESSION  This is a 55 y.o. female with a PMHx significant for COPD, GERD, hypothyroidism, restless leg syndrome who presented with nausea and vomiting and found to have UTI and hypertensive urgency.    Principal Problem:    Complicated urinary tract infection  Active Problems:    Hypertensive urgency    Leukocytosis  Resolved Problems:    * No resolved hospital problems. *    Complicated urinary tract infection  Leukocytosis     On examination patient is fever, chills, abdominal pain.  Coming from a facility.  WBC 11.8, CT scan revealed mild urinary bladder wall thickening with bilateral neocortex hypoenhancement concerning for infection.  UA revealed cloudy urine with many bacteria and trace hemoglobin.  Urine culture, showing growth of gram-negative rods.  Blood cultures x 2 shows no growth so far.  IV Rocephin 1 mg every 24 hours     Hypertensive

## 2025-05-30 NOTE — PLAN OF CARE
Problem: Pain  Goal: Verbalizes/displays adequate comfort level or baseline comfort level  Outcome: Progressing-Patient assessed for pain level this shift via 0-10 scale, reassessed appropriately post-interventions.     Problem: Discharge Planning  Goal: Discharge to home or other facility with appropriate resources  Outcome: Progressing-Discharge planning is in progress at this time.  Flowsheets (Taken 5/30/2025 0800)  Discharge to home or other facility with appropriate resources: Identify barriers to discharge with patient and caregiver     Problem: ABCDS Injury Assessment  Goal: Absence of physical injury  Outcome: Progressing-no new injuries during this shift.     Problem: Safety - Adult  Goal: Free from fall injury  Outcome: Progressing-Patient free from falls during this shift.

## 2025-05-30 NOTE — PLAN OF CARE
Problem: Pain  Goal: Verbalizes/displays adequate comfort level or baseline comfort level  5/30/2025 1820 by Kendell He RN  Outcome: Progressing  5/30/2025 0815 by Corina Culp RN  Outcome: Progressing     Problem: Discharge Planning  Goal: Discharge to home or other facility with appropriate resources  5/30/2025 1820 by Kendell He RN  Outcome: Progressing  5/30/2025 0815 by Corina Culp RN  Outcome: Progressing  Flowsheets (Taken 5/30/2025 0800)  Discharge to home or other facility with appropriate resources: Identify barriers to discharge with patient and caregiver     Problem: ABCDS Injury Assessment  Goal: Absence of physical injury  5/30/2025 1820 by Kendell He RN  Outcome: Progressing  5/30/2025 0815 by Corina Culp RN  Outcome: Progressing     Problem: Safety - Adult  Goal: Free from fall injury  5/30/2025 1820 by Kendell He RN  Outcome: Progressing  5/30/2025 0815 by Corina Culp RN  Outcome: Progressing

## 2025-05-31 VITALS
TEMPERATURE: 98.3 F | RESPIRATION RATE: 14 BRPM | HEART RATE: 95 BPM | WEIGHT: 113 LBS | BODY MASS INDEX: 20.8 KG/M2 | DIASTOLIC BLOOD PRESSURE: 95 MMHG | HEIGHT: 62 IN | OXYGEN SATURATION: 97 % | SYSTOLIC BLOOD PRESSURE: 114 MMHG

## 2025-05-31 LAB
ANION GAP SERPL CALCULATED.3IONS-SCNC: 15 MMOL/L (ref 9–16)
BASOPHILS # BLD: 0.04 K/UL (ref 0–0.2)
BASOPHILS NFR BLD: 0 % (ref 0–2)
BUN SERPL-MCNC: 25 MG/DL (ref 6–20)
CALCIUM SERPL-MCNC: 9.5 MG/DL (ref 8.6–10.4)
CHLORIDE SERPL-SCNC: 98 MMOL/L (ref 98–107)
CO2 SERPL-SCNC: 21 MMOL/L (ref 20–31)
CREAT SERPL-MCNC: 0.9 MG/DL (ref 0.6–0.9)
EKG ATRIAL RATE: 103 BPM
EKG P-R INTERVAL: 122 MS
EKG Q-T INTERVAL: 418 MS
EKG QRS DURATION: 130 MS
EKG QTC CALCULATION (BAZETT): 547 MS
EKG R AXIS: 103 DEGREES
EKG T AXIS: -60 DEGREES
EKG VENTRICULAR RATE: 103 BPM
EOSINOPHIL # BLD: 0.03 K/UL (ref 0–0.44)
EOSINOPHILS RELATIVE PERCENT: 0 % (ref 1–4)
ERYTHROCYTE [DISTWIDTH] IN BLOOD BY AUTOMATED COUNT: 12.5 % (ref 11.8–14.4)
GFR, ESTIMATED: 76 ML/MIN/1.73M2
GLUCOSE SERPL-MCNC: 121 MG/DL (ref 74–99)
HCT VFR BLD AUTO: 43.6 % (ref 36.3–47.1)
HGB BLD-MCNC: 14.3 G/DL (ref 11.9–15.1)
IMM GRANULOCYTES # BLD AUTO: 0.03 K/UL (ref 0–0.3)
IMM GRANULOCYTES NFR BLD: 0 %
LYMPHOCYTES NFR BLD: 1.86 K/UL (ref 1.1–3.7)
LYMPHOCYTES RELATIVE PERCENT: 17 % (ref 24–43)
MCH RBC QN AUTO: 30.4 PG (ref 25.2–33.5)
MCHC RBC AUTO-ENTMCNC: 32.8 G/DL (ref 28.4–34.8)
MCV RBC AUTO: 92.8 FL (ref 82.6–102.9)
MONOCYTES NFR BLD: 0.77 K/UL (ref 0.1–1.2)
MONOCYTES NFR BLD: 7 % (ref 3–12)
NEUTROPHILS NFR BLD: 76 % (ref 36–65)
NEUTS SEG NFR BLD: 8.54 K/UL (ref 1.5–8.1)
NRBC BLD-RTO: 0 PER 100 WBC
PLATELET # BLD AUTO: 299 K/UL (ref 138–453)
PMV BLD AUTO: 10.5 FL (ref 8.1–13.5)
POTASSIUM SERPL-SCNC: 3.9 MMOL/L (ref 3.7–5.3)
RBC # BLD AUTO: 4.7 M/UL (ref 3.95–5.11)
SODIUM SERPL-SCNC: 134 MMOL/L (ref 136–145)
TROPONIN I SERPL HS-MCNC: 14 NG/L (ref 0–14)
WBC OTHER # BLD: 11.3 K/UL (ref 3.5–11.3)

## 2025-05-31 PROCEDURE — 2500000003 HC RX 250 WO HCPCS

## 2025-05-31 PROCEDURE — 6370000000 HC RX 637 (ALT 250 FOR IP)

## 2025-05-31 PROCEDURE — 36415 COLL VENOUS BLD VENIPUNCTURE: CPT

## 2025-05-31 PROCEDURE — 93010 ELECTROCARDIOGRAM REPORT: CPT | Performed by: INTERNAL MEDICINE

## 2025-05-31 PROCEDURE — 84484 ASSAY OF TROPONIN QUANT: CPT

## 2025-05-31 PROCEDURE — 85025 COMPLETE CBC W/AUTO DIFF WBC: CPT

## 2025-05-31 PROCEDURE — 6360000002 HC RX W HCPCS

## 2025-05-31 PROCEDURE — 99232 SBSQ HOSP IP/OBS MODERATE 35: CPT | Performed by: INTERNAL MEDICINE

## 2025-05-31 PROCEDURE — 80048 BASIC METABOLIC PNL TOTAL CA: CPT

## 2025-05-31 RX ORDER — CEPHALEXIN 500 MG/1
500 CAPSULE ORAL 2 TIMES DAILY
Qty: 24 CAPSULE | Refills: 0 | OUTPATIENT
Start: 2025-05-31 | End: 2025-06-12

## 2025-05-31 RX ORDER — AMLODIPINE BESYLATE 5 MG/1
5 TABLET ORAL DAILY
Qty: 30 TABLET | Refills: 3 | Status: SHIPPED | OUTPATIENT
Start: 2025-06-01

## 2025-05-31 RX ORDER — LOSARTAN POTASSIUM 25 MG/1
25 TABLET ORAL DAILY
Qty: 30 TABLET | Refills: 3 | OUTPATIENT
Start: 2025-06-01

## 2025-05-31 RX ORDER — LOSARTAN POTASSIUM 25 MG/1
25 TABLET ORAL DAILY
Qty: 30 TABLET | Refills: 3 | Status: SHIPPED | OUTPATIENT
Start: 2025-06-01

## 2025-05-31 RX ORDER — AMLODIPINE BESYLATE 5 MG/1
5 TABLET ORAL DAILY
Qty: 30 TABLET | Refills: 3 | OUTPATIENT
Start: 2025-06-01

## 2025-05-31 RX ORDER — CEPHALEXIN 500 MG/1
500 CAPSULE ORAL 2 TIMES DAILY
Qty: 24 CAPSULE | Refills: 0 | Status: SHIPPED | OUTPATIENT
Start: 2025-05-31 | End: 2025-06-12

## 2025-05-31 RX ORDER — ACETAMINOPHEN 325 MG/1
650 TABLET ORAL EVERY 6 HOURS PRN
Qty: 56 TABLET | Refills: 0 | Status: SHIPPED | OUTPATIENT
Start: 2025-05-31 | End: 2025-06-07

## 2025-05-31 RX ORDER — ACETAMINOPHEN 325 MG/1
650 TABLET ORAL EVERY 6 HOURS PRN
Qty: 56 TABLET | Refills: 0 | OUTPATIENT
Start: 2025-05-31 | End: 2025-06-07

## 2025-05-31 RX ADMIN — ACETAMINOPHEN, ASPIRIN, CAFFEINE 1 TABLET: 250; 250; 65 TABLET, FILM COATED ORAL at 06:18

## 2025-05-31 RX ADMIN — DULOXETINE HYDROCHLORIDE 30 MG: 30 CAPSULE, DELAYED RELEASE ORAL at 08:07

## 2025-05-31 RX ADMIN — ENOXAPARIN SODIUM 40 MG: 100 INJECTION SUBCUTANEOUS at 08:10

## 2025-05-31 RX ADMIN — KETOROLAC TROMETHAMINE 15 MG: 15 INJECTION, SOLUTION INTRAMUSCULAR; INTRAVENOUS at 00:06

## 2025-05-31 RX ADMIN — SODIUM CHLORIDE, PRESERVATIVE FREE 10 ML: 5 INJECTION INTRAVENOUS at 08:10

## 2025-05-31 RX ADMIN — LOSARTAN POTASSIUM 25 MG: 50 TABLET, FILM COATED ORAL at 08:08

## 2025-05-31 RX ADMIN — GABAPENTIN 300 MG: 300 CAPSULE ORAL at 08:08

## 2025-05-31 RX ADMIN — ACETAMINOPHEN 650 MG: 325 TABLET ORAL at 10:43

## 2025-05-31 RX ADMIN — AMLODIPINE BESYLATE 5 MG: 5 TABLET ORAL at 08:07

## 2025-05-31 ASSESSMENT — PAIN SCALES - GENERAL
PAINLEVEL_OUTOF10: 0
PAINLEVEL_OUTOF10: 2
PAINLEVEL_OUTOF10: 5
PAINLEVEL_OUTOF10: 8
PAINLEVEL_OUTOF10: 0
PAINLEVEL_OUTOF10: 7
PAINLEVEL_OUTOF10: 0

## 2025-05-31 NOTE — CARE COORDINATION
05/31/25 0846   Readmission Assessment   Number of Days since last admission? 8-30 days   Previous Disposition Home Alone   Who is being Interviewed Patient   What was the patient's/caregiver's perception as to why they think they needed to return back to the hospital? Other (Comment)  (back pain)   Did you visit your Primary Care Physician after you left the hospital, before you returned this time? No   Why weren't you able to visit your PCP? Did not have an appointment   Did you see a specialist, such as Cardiac, Pulmonary, Orthopedic Physician, etc. after you left the hospital? No   Who advised the patient to return to the hospital? Self-referral   Does the patient report anything that got in the way of taking their medications? No   In our efforts to provide the best possible care to you and others like you, can you think of anything that we could have done to help you after you left the hospital the first time, so that you might not have needed to return so soon? Other (Comment)  (no)

## 2025-05-31 NOTE — DISCHARGE SUMMARY
Patient Ivs removed. Patient received meds to beds. Patient AVS reviewed with patient and correction[facility] treatment facility ([facility]) transport. Patients medications given to [facility] transport. Patient wheeled down handcuffed to chair and was then given into [facility] custody. Writer left a note in AVS that patient received norco one time during her stay. All patient belongings sent with her. All questions answered.

## 2025-05-31 NOTE — DISCHARGE INSTRUCTIONS
You came to the hospital with complaint of nausea and vomiting and you were found to have a UTI and high blood pressure.  You were started on IV antibiotics for your infection and your home antihypertensives were started.    Your IV antibiotics were changed to oral Keflex.  Please start taking Keflex 500 mg twice daily for the next 10 days.  Please start taking amlodipine 5 mg daily and losartan 25 mg daily for your hypertension management.  Please take Excedrin Migraine for your headache every 6 hours as needed, make sure not to overuse the medication to avoid medication induced headaches  Please make an appointment and visit your primary care provider within 1 week.  Please discuss with your primary care provider regarding your thyroid medication, antihypertensive medication and headache management.  In case of worsening symptoms or development of new severe symptoms such as chest pain or shortness of breath please visit your nearest emergency department or call 911.

## 2025-05-31 NOTE — PROGRESS NOTES
[facility]  Internal Medicine Teaching Residency Program  Inpatient Daily Progress Note  ______________________________________________________________________________    Patient: Elizabeth Alex  YOB: 1970   MRN:[id number]    Acct: [id number]     Room: [facility]0431-01  Admit date: 5/28/2025  Today's date: 05/31/25  Number of days in the hospital: 3    SUBJECTIVE   Admitting Diagnosis: Complicated urinary tract infection    CC: Nausea and vomiting     Pt examined at bedside. Chart & results reviewed.   No acute events overnight.  AOx4.  Hemodynamically stable with 167/97 mmHg however patient did not receive morning meds maintaining saturation at around 96% on room air.  Patient is complaining of headache and chest pressure.  Patient on IV Rocephin.    Morning labs reviewed, BMP revealed glucose 121.  CBC revealed leukocytosis of 14.3.    Urine culture is showing growth of gram-negative rods.    ROS:  Constitutional:  negative for chills, fevers, sweats  Respiratory:  negative for cough, dyspnea on exertion, hemoptysis, shortness of breath, wheezing  Cardiovascular:  negative for chest pain, chest pressure/discomfort, lower extremity edema, palpitations  Gastrointestinal:  negative for abdominal pain, constipation, diarrhea, nausea, vomiting  Neurological:  negative for dizziness, headache    Plan :-    Will continue patient on antibiotics for complicated UTI.  Will follow-up on final urine culture results.  Will replace electrolytes as needed.    BRIEF HISTORY     The patient is a pleasant 55 y.o. female with a PMHx significant for      Bipolar disorder  COPD  GERD  Restless leg syndrome  Hypothyroidism  History of polysubstance abuse     presents with a chief complaint of nausea and vomiting.  Patient reported that she started experiencing nausea and vomiting about 1 day before admission.  She mentioned that vomiting was sudden in onset, projectile, bilious and  0.9     BNP: No results for input(s): \"BNP\" in the last 72 hours.  PT/INR: No results for input(s): \"PROTIME\", \"INR\" in the last 72 hours.  APTT: No results for input(s): \"APTT\" in the last 72 hours.  CARDIAC ENZYMES: No results for input(s): \"CKMB\", \"CKMBINDEX\", \"TROPONINI\" in the last 72 hours.    Invalid input(s): \"CKTOTAL;3\"  FASTING LIPID PANEL:  Lab Results   Component Value Date    CHOL 145 04/22/2025    HDL 71 04/22/2025    TRIG 34 04/22/2025     LIVER PROFILE:   No results for input(s): \"AST\", \"ALT\", \"BILIDIR\", \"BILITOT\", \"ALKPHOS\" in the last 72 hours.    Invalid input(s): \"ALB\"     MICROBIOLOGY:   Lab Results   Component Value Date/Time    CULTURE ESCHERICHIA COLI >100,000 CFU/ML (A) 05/28/2025 11:54 PM       Imaging:    CT ABDOMEN PELVIS W IV CONTRAST Additional Contrast? None  Result Date: 5/28/2025  Mild urinary bladder wall thickening and subtle areas of hypoenhancement within the renal cortex bilaterally that may relate to an infectious etiology and correlation with urinalysis is needed.       ASSESSMENT & PLAN     Assessment and Plan:    IMPRESSION  This is a 55 y.o. female with a PMHx significant for COPD, GERD, hypothyroidism, restless leg syndrome who presented with nausea and vomiting and found to have UTI and hypertensive urgency.    Principal Problem:    Complicated urinary tract infection  Active Problems:    Hypertensive urgency    Leukocytosis  Resolved Problems:    * No resolved hospital problems. *    Complicated urinary tract infection  Leukocytosis     On examination patient is fever, chills, abdominal pain.  Coming from a facility.  WBC 11.8, CT scan revealed mild urinary bladder wall thickening with bilateral neocortex hypoenhancement concerning for infection.  UA revealed cloudy urine with many bacteria and trace hemoglobin.  Urine culture, showing growth of gram-negative rods.  Blood cultures x 2 shows no growth so far.  IV Rocephin 1 mg every 24 hours     Hypertensive urgency:

## 2025-05-31 NOTE — DISCHARGE INSTR - COC
Continuity of Care Form    Patient Name: Elizabeth Alex   :  1970  MRN:  [id number]    Admit date:  2025  Discharge date:  2025    Code Status Order: Full Code   Advance Directives:     Admitting Physician:  Reyes Loomis MD  PCP: Gayatri Saldivar, APRN - CNP    Discharging Nurse: Kendell He RN  Discharging Hospital Unit/Room#: 0431/0431-01  Discharging Unit Phone Number: [phone]    Emergency Contact:   Extended Emergency Contact Information  Primary Emergency Contact: Sharif Murdock  Address: [address] [place] [address]  Home Phone: [phone]  Mobile Phone: [phone]  Relation: Spouse    Past Surgical History:  Past Surgical History:   Procedure Laterality Date    BACK SURGERY      lumbar back surgery     SECTION, LOW TRANSVERSE      CHOLECYSTECTOMY      COLONOSCOPY      COLONOSCOPY N/A 10/14/2022    COLONOSCOPY DIAGNOSTIC performed by Anthony Claros MD at [facility]    ENDOSCOPY, COLON, DIAGNOSTIC      HAND TENDON SURGERY Left     wrist    INTRAUTERINE DEVICE INSERTION      Mirena    INTRAUTERINE DEVICE REMOVAL  2016    LUMBAR SPINE SURGERY Right 2025    **ADD-ON** RIGHT LUMBAR 4-5 MICRODISCECTOMY performed by Bharathi Quevedo DO at [facility] OR    NECK SURGERY      OTHER SURGICAL HISTORY      Per pt report polyps removed from voice box    OVARIAN CYST REMOVAL      OVARIAN CYST REMOVAL Bilateral 2005    AK ESOPHAGOGASTRODUODENOSCOPY TRANSORAL DIAGNOSTIC N/A 10/17/2017    mild esophagitis; gastritis    AK LAPAROSCOPY SURG CHOLECYSTECTOMY N/A 2018    CHOLECYSTECTOMY LAPAROSCOPIC ROBOTIC MULTI PORT performed by Mack Mancia MD at [facility] OR    UPPER GASTROINTESTINAL ENDOSCOPY N/A 2018    [facility]--FRAGMENTS OF GASTRIC ANTRAL MUCOSA W/ CHRONIC REACTIVE GASTROPATHY/CHEMICAL GASTRITIS     UPPER GASTROINTESTINAL ENDOSCOPY N/A 10/14/2022    EGD BIOPSY performed by Anthony Claros MD at [facility] ENDO

## 2025-05-31 NOTE — PLAN OF CARE
Problem: Pain  Goal: Verbalizes/displays adequate comfort level or baseline comfort level  5/31/2025 1121 by Kendell He RN  Outcome: Progressing  5/30/2025 2217 by Emiliana Neal RN  Outcome: Progressing     Problem: Discharge Planning  Goal: Discharge to home or other facility with appropriate resources  5/31/2025 1121 by Kendell He RN  Outcome: Progressing  5/30/2025 2217 by Emiliana Neal RN  Outcome: Progressing     Problem: ABCDS Injury Assessment  Goal: Absence of physical injury  5/31/2025 1121 by Kendell He RN  Outcome: Progressing  5/30/2025 2217 by Emiliana Neal RN  Outcome: Progressing     Problem: Safety - Adult  Goal: Free from fall injury  5/31/2025 1121 by Kendell He RN  Outcome: Progressing  5/30/2025 2217 by Emiliana Neal RN  Outcome: Progressing

## 2025-05-31 NOTE — PROGRESS NOTES
CLINICAL PHARMACY NOTE: MEDS TO BEDS    Total # of Prescriptions Filled: 4   The following medications were delivered to the patient:  Losartan pot 25 mg   Amlodipine bes 5 mg   Acetaminophen 325 mg   Cephalexin 500 mg     Additional Documentation:  Delivered x4 to patient room 431 on 5/31 at 12:27p. $0. (pain reliever extra transferred to [email or web address])

## 2025-05-31 NOTE — PLAN OF CARE
Problem: Pain  Goal: Verbalizes/displays adequate comfort level or baseline comfort level  5/30/2025 2217 by Emiliana Neal, RN  Outcome: Progressing     Problem: Discharge Planning  Goal: Discharge to home or other facility with appropriate resources  5/30/2025 2217 by Emiliana Neal, RN  Outcome: Progressing     Problem: ABCDS Injury Assessment  Goal: Absence of physical injury  5/30/2025 2217 by Emiliana Neal, RN  Outcome: Progressing     Problem: Safety - Adult  Goal: Free from fall injury  5/30/2025 2217 by Emiliana Neal, RN  Outcome: Progressing

## 2025-06-01 LAB
MICROORGANISM SPEC CULT: NORMAL
MICROORGANISM SPEC CULT: NORMAL
SERVICE CMNT-IMP: NORMAL
SERVICE CMNT-IMP: NORMAL
SPECIMEN DESCRIPTION: NORMAL
SPECIMEN DESCRIPTION: NORMAL

## 2025-06-01 NOTE — CARE COORDINATION
Transitional planning: Met w/ patient r/t discharge plan. Plan is discharge today and patient has called correctional facility herself and they will transport.    Discharge Report    [facility]  Clinical Case Management Department  Written by: Shara Davies RN/CM    Patient Name: Elizabeth Alex  Attending Provider: No att. providers found  Admit Date: 2025  5:48 AM  MRN: [id number]  Account: [id number]                     : 1970  Discharge Date: 2025      Disposition: [facility] facility per their transportation     Shara Davies RN/CM

## 2025-06-04 NOTE — DISCHARGE SUMMARY
[facility]     Department of Internal Medicine - Staff Internal Medicine Teaching Service    INPATIENT DISCHARGE SUMMARY      Patient Identification:  Elizabeth Alex is a 55 y.o. female.  :  1970  MRN: [id number]     Acct: [id number]   PCP: Gayatri Saldivar APRN - CNP  Admit Date:  2025  Discharge date and time: 2025  2:59 PM   Attending Provider: No att. providers found                                     ACTIVE DISCHARGE DIAGNOSES     Hospital Problem Lists:  Principal Problem:    Complicated urinary tract infection  Active Problems:    Hypertensive urgency    Leukocytosis  Resolved Problems:    * No resolved hospital problems. *      HOSPITAL STAY     Brief Inpatient course:   Elizabeth Alex is a 55 y.o. female PMHx significant for COPD, GERD, hypothyroidism, restless leg syndrome who was admitted for the management of Complicated urinary tract infection, presented to the emergency department with nausea and vomiting.  Initial vitals in the ED were concerning for hypertensive urgency and labs revealed leukocytosis of 11.1, .  UA revealed cloudy urine with many bacteria and trace hemoglobin.  Abdominal imaging with CT scan of abdomen and pelvis with IV contrast revealed mild urinary bladder wall thickening and areas of hypoenhancement bilaterally.  Patient was started on IV Rocephin and home antihypertensive medications were started.  Urine culture obtained which showed growth of gram-negative rods.  On 2023 patient symptomatically improved and dismisses of the following instructions on Keflex for antibiotic orders.      Procedures/ Significant Interventions:    None    Consults:     Consults:     Final Specialist Recommendations/Findings:   IP CONSULT TO INTERNAL MEDICINE  IP CONSULT TO CASE MANAGEMENT      Any Hospital Acquired Infections: none    Discharge Functional Status:  stable    DISCHARGE PLAN     Disposition: home    Patient Instructions:

## (undated) DEVICE — SPONGE LAP W18XL18IN WHT COT 4 PLY FLD STRUNG RADPQ DISP ST 2 PER PACK

## (undated) DEVICE — DRAPE C ARM W/ POLY STRP W42XL72IN FOR MOB XR

## (undated) DEVICE — GOWN,AURORA,NONREINFORCED,LARGE: Brand: MEDLINE

## (undated) DEVICE — STVZ LUMBAR SPINE PACK: Brand: MEDLINE INDUSTRIES, INC.

## (undated) DEVICE — BLADE ES ELASTOMERIC COAT INSUL DURABLE BEND UPTO 90DEG

## (undated) DEVICE — SUTURE PDS II SZ 0 L27IN ABSRB VLT UR-6 L26MM 1/2 CIR D7185

## (undated) DEVICE — GLOVE SURG SZ 85 L12IN FNGR THK79MIL GRN LTX FREE

## (undated) DEVICE — SUTURE VICRYL SZ 2-0 L18IN ABSRB UD CT-1 L36MM 1/2 CIR J839D

## (undated) DEVICE — DEFENDO AIR WATER SUCTION AND BIOPSY VALVE KIT FOR  OLYMPUS: Brand: DEFENDO AIR/WATER/SUCTION AND BIOPSY VALVE

## (undated) DEVICE — SUTURE VICRYL + SZ 0 L18IN ABSRB UD L36MM CT-1 1/2 CIR VCP840D

## (undated) DEVICE — GLOVE SURG SZ 75 CRM LTX FREE POLYISOPRENE POLYMER BEAD ANTI

## (undated) DEVICE — GOWN,SIRUS,NONRNF,SETINSLV,XL,20/CS: Brand: MEDLINE

## (undated) DEVICE — STRIP,CLOSURE,WOUND,MEDI-STRIP,1/2X4: Brand: MEDLINE

## (undated) DEVICE — Z DUP USE 2641840 CLIP INT L POLYMER LOK LIG HEM O LOK

## (undated) DEVICE — STRAP,POSITIONING,KNEE/BODY,FOAM,4X60": Brand: MEDLINE

## (undated) DEVICE — PROTECTOR ULN NRV PUR FOAM HK LOOP STRP ANATOMICALLY

## (undated) DEVICE — 3.0MM PRECISION NEURO (MATCH HEAD)

## (undated) DEVICE — ST CHARLES GEN LAPAROSCOPY PK: Brand: MEDLINE INDUSTRIES, INC.

## (undated) DEVICE — DRAPE,LAP,CHOLE,W/TROUGHS,STERILE: Brand: MEDLINE

## (undated) DEVICE — OBTURATOR ROBOTIC DIA8MM BLDELSS ENDOSCP DISP DA VINCI SI

## (undated) DEVICE — Z INACTIVE USE 2653177 SPONGE GZ W2XL2IN NONWOVEN 4 PLY FASTER WICKING ABIL AVANT

## (undated) DEVICE — APPLICATOR MEDICATED 26 CC SOLUTION HI LT ORNG CHLORAPREP

## (undated) DEVICE — SOLUTION IV IRRIG POUR BRL 0.9% SODIUM CHL 2F7124

## (undated) DEVICE — AGENT HEMOSTATIC SURGIFLOW MATRIX KIT W/THROMBIN

## (undated) DEVICE — DRAPE,REIN 53X77,STERILE: Brand: MEDLINE

## (undated) DEVICE — SHEET,DRAPE,70X100,STERILE: Brand: MEDLINE

## (undated) DEVICE — SUTURE MONOCRYL SZ 4-0 L18IN ABSRB UD L16MM PC-3 3/8 CIR PRIM Y845G

## (undated) DEVICE — COVER LT HNDL BLU PLAS

## (undated) DEVICE — 1000 S-DRAPE TOWEL DRAPE 10/BX: Brand: STERI-DRAPE™

## (undated) DEVICE — SUTURE NONABSORBABLE MONOFILAMENT 3-0 PS-1 18 IN BLK ETHILON 1663H

## (undated) DEVICE — TROCAR: Brand: KII FIOS FIRST ENTRY

## (undated) DEVICE — Device

## (undated) DEVICE — GLOVE SURG SZ 8 CRM LTX FREE POLYISOPRENE POLYMER BEAD ANTI

## (undated) DEVICE — GAUZE,SPONGE,FLUFF,6"X6.75",STRL,5/TRAY: Brand: MEDLINE

## (undated) DEVICE — SOLUTION ANTIFOG VIS SYS CLEARIFY LAPSCP

## (undated) DEVICE — ENDO KIT W/SYRINGE: Brand: MEDLINE INDUSTRIES, INC.

## (undated) DEVICE — YANKAUER,FLEXIBLE HANDLE,REGLR CAPACITY: Brand: MEDLINE INDUSTRIES, INC.

## (undated) DEVICE — KIT DRP 3 ARM ACC DISP ENDOWRIST DA VINCI SI

## (undated) DEVICE — PENCIL ES L3M BTTN SWCH HOLSTER W/ BLDE ELECTRD EDGE

## (undated) DEVICE — STERILE POLYISOPRENE POWDER-FREE SURGICAL GLOVES WITH EMOLLIENT COATING: Brand: PROTEXIS

## (undated) DEVICE — Z INACTIVE USE 2641839 CLIP INT M L POLYMER LOK LIG HEM O LOK

## (undated) DEVICE — COVER,MAYO STAND,XL,STERILE: Brand: MEDLINE

## (undated) DEVICE — GLOVE ORANGE PI 7   MSG9070

## (undated) DEVICE — DISPOSABLE LAPAROSCOPIC CORDS, 1 PER POUCH: Brand: A&E MEDICAL / DISPOSABLE LAPAROSCOPIC CORDS

## (undated) DEVICE — COVER,TABLE,60X90,STERILE: Brand: MEDLINE

## (undated) DEVICE — 3M™ WARMING BLANKET, UPPER BODY, 10 PER CASE, 42268: Brand: BAIR HUGGER™

## (undated) DEVICE — JELLY,LUBE,STERILE,FLIP TOP,TUBE,2-OZ: Brand: MEDLINE

## (undated) DEVICE — BITEBLOCK 54FR W/ DENT RIM BLOX

## (undated) DEVICE — LIQUIBAND RAPID ADHESIVE 36/CS 0.8ML: Brand: MEDLINE

## (undated) DEVICE — 450 ML BOTTLE OF 0.05% CHLORHEXIDINE GLUCONATE IN 99.95% STERILE WATER FOR IRRIGATION, USP AND APPLICATOR.: Brand: IRRISEPT ANTIMICROBIAL WOUND LAVAGE

## (undated) DEVICE — GARMENT,MEDLINE,DVT,INT,CALF,MED, GEN2: Brand: MEDLINE

## (undated) DEVICE — CANNULA NSL AD L2IN ETCO2 SAMP SFT CRUSH RESIST FEM AIRLFE

## (undated) DEVICE — Z DUP USE 2392665 BLADE LARYNGOSCOPE STAT GLIDESCOPE GVL 4

## (undated) DEVICE — SYRINGE, LUER SLIP, 20ML: Brand: MEDLINE

## (undated) DEVICE — FORCEPS BX L240CM WRK CHN 2.8MM STD CAP W/ NDL MIC MESH

## (undated) DEVICE — SUTURE MCRYL + SZ 4-0 L27IN ABSRB UD L19MM PS-2 3/8 CIR MCP426H

## (undated) DEVICE — Z DISCONTINUED GLOVE SURG SZ 7.5 L12IN FNGR THK13MIL WHT ISOLEX

## (undated) DEVICE — TROCARS: Brand: KII® BALLOON BLUNT TIP SYSTEM

## (undated) DEVICE — ELECTRODE PT RET AD L9FT HI MOIST COND ADH HYDRGEL CORDED

## (undated) DEVICE — NEEDLE, QUINCKE, 18GX3.5": Brand: MEDLINE

## (undated) DEVICE — 3M™ STERI-STRIP™ WOUND CLOSURE SYSTEMS 5 EACH/PACK 25 PACKS/CARTON 4 CARTONS/CASE W8516: Brand: 3M™ STERI-STRIP™

## (undated) DEVICE — SYRINGE MED 10ML TRNSLUC BRL PLUNG BLK MRK POLYPR CTRL